# Patient Record
Sex: FEMALE | Race: WHITE | Employment: FULL TIME
[De-identification: names, ages, dates, MRNs, and addresses within clinical notes are randomized per-mention and may not be internally consistent; named-entity substitution may affect disease eponyms.]

---

## 2017-12-21 DIAGNOSIS — D51.9 ANEMIA DUE TO VITAMIN B12 DEFICIENCY, UNSPECIFIED B12 DEFICIENCY TYPE: ICD-10-CM

## 2017-12-21 DIAGNOSIS — E04.2 NONTOXIC MULTINODULAR GOITER: ICD-10-CM

## 2017-12-21 DIAGNOSIS — D50.9 IRON DEFICIENCY ANEMIA, UNSPECIFIED IRON DEFICIENCY ANEMIA TYPE: ICD-10-CM

## 2017-12-22 ENCOUNTER — TELEPHONE (OUTPATIENT)
Dept: INFUSION THERAPY | Facility: MEDICAL CENTER | Age: 37
End: 2017-12-22

## 2017-12-22 NOTE — TELEPHONE ENCOUNTER
I TRIED TO CALL [facility] TO SCHEDULE HER CORTROSYN TEST AND HAD TO LEAVE A MESSAGE TO CALL THE OFFICE TO SCHEDULE.

## 2020-10-03 ENCOUNTER — APPOINTMENT (OUTPATIENT)
Dept: CT IMAGING | Age: 40
End: 2020-10-03
Payer: COMMERCIAL

## 2020-10-03 ENCOUNTER — HOSPITAL ENCOUNTER (EMERGENCY)
Age: 40
Discharge: HOME | End: 2020-10-03
Attending: EMERGENCY MEDICINE
Payer: COMMERCIAL

## 2020-10-03 VITALS
SYSTOLIC BLOOD PRESSURE: 127 MMHG | WEIGHT: 199 LBS | DIASTOLIC BLOOD PRESSURE: 83 MMHG | TEMPERATURE: 98.3 F | BODY MASS INDEX: 35.26 KG/M2 | OXYGEN SATURATION: 98 % | RESPIRATION RATE: 15 BRPM | HEIGHT: 63 IN | HEART RATE: 71 BPM

## 2020-10-03 PROCEDURE — 99284 EMERGENCY DEPT VISIT MOD MDM: CPT

## 2020-10-03 PROCEDURE — 70450 CT HEAD/BRAIN W/O DYE: CPT

## 2020-10-03 RX ORDER — BUSPIRONE HYDROCHLORIDE 15 MG/1
TABLET ORAL
COMMUNITY
End: 2020-12-01 | Stop reason: CLARIF

## 2020-10-03 RX ORDER — LORAZEPAM 0.5 MG/1
TABLET ORAL
COMMUNITY

## 2020-10-03 RX ORDER — PROPRANOLOL HYDROCHLORIDE 20 MG/1
20 TABLET ORAL 3 TIMES DAILY
COMMUNITY
End: 2021-07-22

## 2020-10-03 RX ORDER — GABAPENTIN 100 MG/1
100 CAPSULE ORAL 2 TIMES DAILY
Qty: 28 CAPSULE | Refills: 0 | Status: SHIPPED | OUTPATIENT
Start: 2020-10-03 | End: 2020-12-01 | Stop reason: ALTCHOICE

## 2020-10-03 RX ORDER — PANTOPRAZOLE SODIUM 40 MG/1
40 GRANULE, DELAYED RELEASE ORAL
COMMUNITY

## 2020-10-03 ASSESSMENT — PAIN SCALES - GENERAL: PAINLEVEL_OUTOF10: 6

## 2020-10-03 ASSESSMENT — PAIN DESCRIPTION - LOCATION: LOCATION: HEAD

## 2020-10-03 ASSESSMENT — PAIN DESCRIPTION - PAIN TYPE: TYPE: ACUTE PAIN

## 2020-10-03 ASSESSMENT — PAIN DESCRIPTION - ORIENTATION: ORIENTATION: POSTERIOR;RIGHT

## 2020-10-03 ASSESSMENT — PAIN DESCRIPTION - DESCRIPTORS: DESCRIPTORS: BURNING

## 2020-10-03 NOTE — ED NOTES
Pt arrives to ED with c/o right posterior head pain for 5 days. She states that this hasn't gone away. She states that last night she took Mobic and Zanaflex with no relief. She was at Urgent care and was instructed to come to the ED for a CT scan. She states that her grandmother had an aneurysm, which make her feel anxious. She was told that she needs a nodule removed from her thyroid, which she states has her feeling anxious. Pt neurologically intact. Pt resting in bed, comfort offered, no concerns no s/s of distress.       Liza Valdes RN  10/03/20 7681

## 2020-10-03 NOTE — ED NOTES
Skylar STEELE at bedside updating pt on results and plan of care.          Iva Solomon RN  10/03/20 2544

## 2020-10-03 NOTE — ED PROVIDER NOTES
[address] [facility]  [address] [facility]. [facility] [address]  Phone: [phone]  Fax: [phone]        Pt Name: Brandee Pittman  MRN: [id number]  [place] 1980  Date of evaluation: 10/3/20    [address]       Chief Complaint   Patient presents with    Headache       HISTORY OF PRESENT ILLNESS (Location/Symptom, Timing/Onset, Context/Setting, Quality, Duration, Modifying Factors, Severity)      Brandee Pittman is a 47 y.o. female with pertinent PMH of Fibromyalgia, who presents to the ED via private auto with a headache. Patient reports that for the past 5 days she has been been experiencing a gradual-onset right posterior headache, which she describes as a burning sensation, that has been intermittent since the onset but also waxes and wanes in severity. Denies thunderclap development. Denies any head trauma or injury to the head. She has exacerbation of the pain with touching her head. She had alleviation with the pain when using ice. She has tried her Mobic, muscle relaxer, and anxiety medication without improvement of the pain. She has been able to go to sleep and sleep through the night. Patient does not have a Denies use of blood thinners. She does have family history of a grandmother who  of a cerebral aneurysm at 62. She has personal history of skin cancer and a noncancerous thyroid nodule. Denies fever, chills, recent illness, aura, vision changes, photophobia, phonophobia, cough, congestion, facial pain/weakness, nausea, vomiting, diarrhea, abdominal pain, syncope, extremity weakness, numbness, paresthesias, or any other complaints at this time. PAST MEDICAL / SURGICAL / SOCIAL / FAMILY HISTORY     PMH:  has a past medical history of Cancer ([address] Fibromyalgia, and Thyroid nodule. Surgical History:  has a past surgical history that includes Skin cancer excision. Social History:  reports that she has never smoked.  She has never used smokeless tobacco. She stated age, and is in no acute distress. Head:  Normocephalic, atraumatic, and without obvious abnormality. There is tenderness to palpation of the right occiput extending down into the right cervical region. No rash or wounds noted. No midline neck tenderness. Eyes:  Sclerae/conjunctivae clear without injection, pallor, or icterus. Corneas clear without opacities. PERRL EOM's intact. ENT: Ears and nose are all without obvious masses lesion or deformity. No oropharynx examination performed due to aerosolization risk during COVID-19 pandemic. Neck: Supple and symmetrical. Trachea midline. No adenopathy. No jugular venous distention. Lungs:   No respiratory distress. Clear to auscultation bilaterally. No wheezes, rhonchi, or rales. Heart:  Regular rate. Regular rhythm. No murmurs, rubs, or gallops. Abdomen:   Normoactive bowel sounds. Soft, nontender, nondistended without guarding or rebound. No palpable masses. Extremities: Warm and dry without erythema or edema. Skin: Soft, good turgor, and well-hydrated. No obvious rashes or lesions. Neurologic: GCS is 15 and no focal deficits are appreciated. Oriented to person, place and time. Detailed cognitive testing deferred. Normal and symmetric muscle bulk and tone throughout. Strength is 5/5 throughout. Sensation intact to light touch throughout. Gait with normal base. Normal heel, toe and tandem gait. Good speed, accuracy and rhythm of ADOLFO, FTN, and HTS. No pronator drift. Normal Romberg test. DTRs 2+. CN II-XII grossly intact. Psychiatric: Normal mood and affect. Normal behavior. Coherent thought process.      DIFFERENTIAL DIAGNOSIS / MDM     DDx: SAH, subdural hematoma, epidural, intracerebral, meningitis, encephalitis, migraine, tension HA, cluster HA, sinusitis, dental pain, stroke, encephalitis, abscess, CNS mass, increased ICP, venous thrombosis, CO poisoning, acute angle closure glaucoma, temporal arteritis, idiopathic intracranial hypertension    The patient presents to the Emergency Department with a benign-appearing gradual-onset headache that likely consistent with a tension headache. This is not the worst headache of her life. Vital signs are unremarkable. The neurologic examination is normal. My suspicion for serious pathology is low given a lack of significant risk factors and reassuring history and physical examination. Patient is visibly anxious and is concerned she has an aneurysm as her grandmother  of one. We discussed the risks and benefits of a CT scan and patient elected to have a CT scan performed. She is not experiencing a headache at this time and will hold off on any medications, but will provide a warm blanket for her head/neck. PLAN (LABS / IMAGING / EKG):  Orders Placed This Encounter   Procedures    CT HEAD WO CONTRAST       MEDICATIONS ORDERED:  Orders Placed This Encounter   Medications    gabapentin (NEURONTIN) 100 MG capsule     Sig: Take 1 capsule by mouth 2 times daily for 14 days. Intended supply: 30 days     Dispense:  28 capsule     Refill:  0       Controlled Substances Monitoring:     DIAGNOSTIC RESULTS     EKG: All EKG's are interpreted by the Emergency Department Physician who either signs or Co-signs this chart in the absenceof a cardiologist.    RADIOLOGY:  Non-plain film images such as CT, Ultrasound and MRI are read by the radiologist. Plain radiographic images are visualized by me and the following radiologist interpretations are reviewed. CT HEAD WO CONTRAST   Final Result   No acute intracranial abnormality. LABS:  No results found for this visit on 10/03/20. Jacob Nunez 94 COURSE     ED Course as of Oct 03 1808   Sat Oct 03, 2020   1356 Patient was updated regarding her CT scan that reported no acute intracranial abnormality. Patient notes that her headache is gone at this time and that the warm blanket did notably help her discomfort.   Patient I discussed restarting gabapentin as she felt that this was similar to her fibromyalgia pain, but was concerned since it was on her head and it had never been on her head before. I told her I would give her 2 weeks of gabapentin and she needed to follow-up with her PCP for further evaluation and management. Patient verbalized understanding. [GM]      ED Course User Index  [GM] Kia Cheng PA-C        Vitals:    Vitals:    10/03/20 1248 10/03/20 1332 10/03/20 1408   BP: (!) 141/97 (!) 130/97 127/83   Pulse: 85 85 71   Resp: 15  15   Temp: 98.3 °F (36.8 °C)     TempSrc: Oral     SpO2: 98%     Weight: 90.3 kg (199 lb)     Height: 5' 3\" (1.6 m)       -------------------------  BP: 127/83, Temp: 98.3 °F (36.8 °C), Pulse: 71, Resp: 15      RE-EVALUATION:  See ED Course notes above. I estimate there is LOW risk for SUBARACHNOID HEMORRHAGE, MENINGITIS, ENCEPHALITIS, INTRACRANIAL HEMORRHAGE, SUBDURAL OR EPIDURAL HEMATOMA, OR STROKE, thus I consider the discharge disposition reasonable. The patient and/or family and I and/or the attending have discussed the diagnosis and risks, and we agree with discharging home to follow-up with their primary doctor. The patient appears stable for discharge and has been instructed to return immediately for new concerning symptoms, if the symptoms worsen in any way, or in 8-12 hours if not improved for re-evaluation. We have discussed the symptoms which are most concerning (e.g., significant worsening of the headache or the development of confusion, fever, vision changes, weakness, numbness, difficulty with speech or walking) that necessitate immediate return. The patient understands that at this time there is no evidence for a more malignant underlying process, but the patient also understands that early in the process of an illness or injury, an emergency department workup can be falsely reassuring.  Routine discharge counseling was given, and the patient understands that worsening, changing or persistent symptoms should prompt an immediate call or follow up with their primary physician or return to the emergency department. The importance of appropriate follow up was also discussed. I have reviewed the disposition diagnosis with the patient and or their family/guardian. I have answered their questions and given discharge instructions. They voiced understanding of these instructions and did not have any further questions or complaints. This patient was seen by the attending physician and they agreed with the assessment and plan. CONSULTS:  None    PROCEDURES:  None    FINAL IMPRESSION      1. Tension headache          DISPOSITION / PLAN     CONDITION ON DISPOSITION:   Good / Stable for discharge. PATIENT REFERRED TO:  [facility]   [address] [place] [address] [address]  [address] [place] [address] [address]  [phone]    Call in 2 days        DISCHARGE MEDICATIONS:  Discharge Medication List as of 10/3/2020  2:11 PM      START taking these medications    Details   gabapentin (NEURONTIN) 100 MG capsule Take 1 capsule by mouth 2 times daily for 14 days.  Intended supply: 30 days, Disp-28 capsule,R-0Print             Vickie Collier PA-C   Emergency Medicine Physician Assistant    (Please note that portions of this note were completed with a voice recognition program.  Efforts were made to edit the dictations but occasionally words aremis-transcribed.)     Sandra Coughlin PA-C  10/03/20 4827

## 2020-10-03 NOTE — ED PROVIDER NOTES
[address] [facility]    [address] [address] [facility] [address] [facility] [address]  Phone: [phone]  Fax: [phone]  Emergency Department  Faculty Attestation    I performed a history and physical examination of the patient and discussed management with the mid level provideer. I reviewed the mid level provider's note and agree with the documented findings and plan of care. Any areas of disagreement are noted on the chart. I was personally present for the key portions of any procedures. I have documented in the chart those procedures where I was not present during the key portions. I have reviewed the emergency nurses triage note. I agree with the chief complaint, past medical history, past surgical history, allergies, medications, social and family history as documented unless otherwise noted below. Documentation of the HPI, Physical Exam and Medical Decision Making performed by medical students or scribes is based on my personal performance of the HPI, PE and MDM. For Physician Assistant/ Nurse Practitioner cases/documentation I have personally evaluated this patient and have completed at least one if not all key elements of the E/M (history, physical exam, and MDM). Additional findings are as noted. Primary Care Physician:  Lauren Carvalho DO    CHIEF COMPLAINT       Chief Complaint   Patient presents with    Headache       RECENT VITALS:   Temp: 98.3 °F (36.8 °C),  Pulse: 85, Resp: 15, BP: (!) 130/97    LABS:  Labs Reviewed - No data to display     CT HEAD WO CONTRAST (Final result)   Result time 10/03/20 13:46:42   Final result by Brennen Almonte MD (10/03/20 13:46:42)                 Impression:     No acute intracranial abnormality.              Narrative:     EXAMINATION:   CT OF THE HEAD WITHOUT CONTRAST  10/3/2020 1:21 pm     TECHNIQUE:   CT of the head was performed without the administration of intravenous   contrast. Dose modulation, iterative reconstruction, and/or weight based   adjustment of the mA/kV was utilized to reduce the radiation dose to as low   as reasonably achievable. COMPARISON:   None. HISTORY:   ORDERING SYSTEM PROVIDED HISTORY: Atraumatic Right Posterior Headache x5 days   TECHNOLOGIST PROVIDED HISTORY:   Atraumatic Right Posterior Headache x5 days     Is the patient pregnant?->No   Reason for Exam: Rt posterior head pain for 5 days, headache   Acuity: Acute   Type of Exam: Initial     FINDINGS:   BRAIN/VENTRICLES: There is no acute intracranial hemorrhage, mass effect or   midline shift.  No abnormal extra-axial fluid collection.  The gray-white   differentiation is maintained without evidence of an acute infarct.  There is   no evidence of hydrocephalus. ORBITS: The visualized portion of the orbits demonstrate no acute abnormality. SINUSES: The visualized paranasal sinuses and mastoid air cells demonstrate   no acute abnormality. SOFT TISSUES/SKULL:  No acute abnormality of the visualized skull or soft   tissues. PERTINENT ATTENDING PHYSICIAN COMMENTS:    The patient presents with a headache. She says she is having intermittent pain in the right occiput that radiates from her neck over her head. She denies any trauma. She does have a history of fibromyalgia. It is not accompanied by photosensitivity or nausea. She denies any focal weakness or numbness. She has not had CT imaging done before. She used to take gabapentin for her pain and it seemed to help, but she has not been taking it recently. The patient is worried because she has a family history of cerebral aneurysm. However, she is not having the worst headache of her life, sudden onset of pain, or thunderclap-like pain. On exam, the patient seems to indicate pain in the right occiput at the nuchal line. This seems to be consistent with a tension headache. She has no focal deficits. CT of the head was obtained is negative. We spoke with the patient about her discomfort.   She would like to start go back on the gabapentin. We will write a prescription and she can follow-up with her doctor. My index of suspicion for intracranial hemorrhage or infection is low. The patient is discharged in good condition. Valerie Smith MD  10/03/20 4944

## 2020-12-01 ENCOUNTER — OFFICE VISIT (OUTPATIENT)
Dept: PRIMARY CARE CLINIC | Age: 40
End: 2020-12-01
Payer: COMMERCIAL

## 2020-12-01 VITALS
BODY MASS INDEX: 36.24 KG/M2 | SYSTOLIC BLOOD PRESSURE: 132 MMHG | HEART RATE: 92 BPM | OXYGEN SATURATION: 98 % | DIASTOLIC BLOOD PRESSURE: 80 MMHG | WEIGHT: 204.6 LBS

## 2020-12-01 PROBLEM — R73.03 PRE-DIABETES: Status: ACTIVE | Noted: 2020-12-01

## 2020-12-01 PROBLEM — F32.A ANXIETY AND DEPRESSION: Status: ACTIVE | Noted: 2020-12-01

## 2020-12-01 PROBLEM — F41.9 ANXIETY AND DEPRESSION: Status: ACTIVE | Noted: 2020-12-01

## 2020-12-01 PROCEDURE — 1036F TOBACCO NON-USER: CPT | Performed by: NURSE PRACTITIONER

## 2020-12-01 PROCEDURE — 99204 OFFICE O/P NEW MOD 45 MIN: CPT | Performed by: NURSE PRACTITIONER

## 2020-12-01 PROCEDURE — G8419 CALC BMI OUT NRM PARAM NOF/U: HCPCS | Performed by: NURSE PRACTITIONER

## 2020-12-01 PROCEDURE — G8427 DOCREV CUR MEDS BY ELIG CLIN: HCPCS | Performed by: NURSE PRACTITIONER

## 2020-12-01 PROCEDURE — G8484 FLU IMMUNIZE NO ADMIN: HCPCS | Performed by: NURSE PRACTITIONER

## 2020-12-01 RX ORDER — BUPROPION HYDROCHLORIDE 100 MG/1
100 TABLET ORAL 2 TIMES DAILY
COMMUNITY
End: 2021-07-22

## 2020-12-01 ASSESSMENT — ENCOUNTER SYMPTOMS
SINUS PRESSURE: 0
VOMITING: 0
DIARRHEA: 0
TROUBLE SWALLOWING: 0
ABDOMINAL PAIN: 0
CONSTIPATION: 0
NAUSEA: 0
BLOOD IN STOOL: 0
COUGH: 0
SHORTNESS OF BREATH: 0
WHEEZING: 0
SORE THROAT: 0

## 2020-12-01 NOTE — PROGRESS NOTES
[facility] PRIMARY CARE  [facility] [address]  [address] [address]  Dept: [phone]  Dept Fax: [phone]    Ahsan Norwood is a 47 y.o. female who presentstoday for her medical conditions/complaints as noted below. Ahsan Norwood is c/o of  Chief Complaint   Patient presents with    New Patient     would like to discuss care for post- thyroidectomy, has  a PCP will not follow for that     Other     has tried endocrinology, looking for a better option          HPI:     Here today to establish care as new patient  She states she is currently established with a primary care provider but that person was unwilling to consider managing her thyroid condition postoperatively  She currently is seeing an endocrinologist but was not pleased with her last visit there and is looking for someone to help manage her condition after her surgery  She is planning to have a thyroidectomy in May at [facility] for several enlarging nodules  Reports was initially scheduled for December but had to be rescheduled due to COVID-19 pandemic  She has done significant amounts of research on her condition via the Internet and admits she has probably \"read too much\" and has become very concerned about postop weight gain and other potential medical problems  Reports past history significant for depression and anxiety. Has recently been started on Wellbutrin and states she feels her condition is fairly well managed at this time with that. She also uses Ativan rarely for panic attacks and/or extreme anxiety. Her current prescription is over 3year old.   Was also told that she was \"prediabetic\" and has been taking metformin twice a day  She reports her recent A1c was unchanged from prior to starting the medication there are no labs or previous records available, she states will request from her PCP office      She states she has had a lifelong struggle with her weight  Consequently she tries to follow a healthy diet and maintains a regular exercise routine        No results found for: LABA1C          ( goal A1C is < 7)   No results found for: LABMICR  No results found for: LDLCHOLESTEROL, LDLCALC    (goal LDL is <100)   No results found for: AST, ALT, BUN  BP Readings from Last 3 Encounters:   12/01/20 132/80   10/03/20 127/83          ([id number])    Past Medical History:   Diagnosis Date    Cancer (HCC)-skin     Fibromyalgia     Thyroid nodule       Past Surgical History:   Procedure Laterality Date    SKIN CANCER EXCISION         History reviewed. No pertinent family history. Social History     Tobacco Use    Smoking status: Never Smoker    Smokeless tobacco: Never Used   Substance Use Topics    Alcohol use: Not Currently      Current Outpatient Medications   Medication Sig Dispense Refill    buPROPion (WELLBUTRIN) 100 MG tablet Take 100 mg by mouth 2 times daily      metFORMIN (GLUCOPHAGE) 500 MG tablet 1 tablet with a meal      pantoprazole sodium (PROTONIX) 40 MG PACK packet 1 tablet      LORazepam (ATIVAN) 0.5 MG tablet LORazepam 0.5 MG Oral Tablet  TAKE 1 TABLET DAILY AS NEEDED. Quantity: 30 Refills: 0     Lue Ceja D.O.; Active      propranolol (INDERAL) 20 MG tablet Take 20 mg by mouth 3 times daily       No current facility-administered medications for this visit.       Allergies   Allergen Reactions    Penicillins Other (See Comments)    Sudafed  [Pseudoephedrine Hcl] Other (See Comments)       Health Maintenance   Topic Date Due    Varicella vaccine (1 of 2 - 2-dose childhood series) 03/01/1983    HIV screen  03/01/1997    DTaP/Tdap/Td vaccine (1 - Tdap) 03/01/2001    Cervical cancer screen  03/01/2003    Flu vaccine (1) 09/01/2020    Hepatitis A vaccine  Aged Out    Hepatitis B vaccine  Aged Out    Hib vaccine  Aged Out    Meningococcal (ACWY) vaccine  Aged Out    Pneumococcal 0-64 years Vaccine  Aged Out       Subjective: Review of Systems   Constitutional: Negative for activity change, appetite change, chills, fatigue, fever and unexpected weight change. HENT: Negative for congestion, ear pain, hearing loss, sinus pressure, sore throat and trouble swallowing. Eyes: Negative for visual disturbance. Respiratory: Negative for cough, shortness of breath and wheezing. Cardiovascular: Negative for chest pain, palpitations and leg swelling. Gastrointestinal: Negative for abdominal pain, blood in stool, constipation, diarrhea, nausea and vomiting. Endocrine: Negative for cold intolerance, heat intolerance, polydipsia, polyphagia and polyuria. Genitourinary: Negative for difficulty urinating, frequency, hematuria and urgency. Musculoskeletal: Negative for arthralgias and myalgias. Skin: Negative for rash. Allergic/Immunologic: Negative for environmental allergies. Neurological: Negative for dizziness, weakness, light-headedness and headaches. Psychiatric/Behavioral: Negative for confusion. The patient is not nervous/anxious. Objective:     Physical Exam  Constitutional:       Appearance: She is well-developed. HENT:      Head: Normocephalic. Eyes:      Conjunctiva/sclera: Conjunctivae normal.      Pupils: Pupils are equal, round, and reactive to light. Neck:      Musculoskeletal: Normal range of motion. Cardiovascular:      Rate and Rhythm: Normal rate and regular rhythm. Heart sounds: Normal heart sounds. No murmur. Pulmonary:      Effort: Pulmonary effort is normal.      Breath sounds: Normal breath sounds. No wheezing. Abdominal:      General: Bowel sounds are normal. There is no distension. Palpations: Abdomen is soft. Musculoskeletal: Normal range of motion. Skin:     General: Skin is warm and dry. Neurological:      Mental Status: She is alert and oriented to person, place, and time. Psychiatric:         Behavior: Behavior normal.         Thought Content:  Thought content normal.         Judgment: Judgment normal.       /80   Pulse 92   Wt 204 lb 9.6 oz (92.8 kg)   SpO2 98%   BMI 36.24 kg/m²     Assessment:       Diagnosis Orders   1. Nontoxic multinodular goiter     2. Anxiety and depression     3. Pre-diabetes               Plan:      Return in about 6 months (around 6/1/2021) for after thyroid surgery. Establish care as new patient, offered screening labs but she states will be having a number of labs done prior to her surgery and prefers to avoid screening labs at this time. Reviewed healthy lifestyle habits and encouraged to continue healthy diet choices and regular exercise. She does see an OB/GYN for her women's health needs  Multinodular goiter-lengthy discussion. Clarified to patient this is not an endocrinology office and will be unable to manage her condition once her thyroid is removed. Did discuss will be able to assist her with potential side effects from thyroidectomy such as weight gain, hair loss or blood pressure changes. She is considering staying with her current PCP but at the end of this visit she is undecided whether or not she will remain with this office. She is also encouraged to revisit her current endocrinologist office to speak with another provider there to readdress her concerns and/or to contact [facility] [employer] clinic for her surgeon's opinion on [facility] if she prefers. While she has many valid concerns she appears overly anxious about possible weight gain in particular   Anxiety/depression-stable on Wellbutrin at this time with rare use of Ativan as needed  Prediabetes-lengthy discussion, consider stopping medication but would like to check A1c first.  She will notify office if decides she would like to proceed with some lab work       Patient given educational materials - see patient instructions. Discussed use, benefit, and side effects of prescribed medications.   All patientquestions

## 2021-01-26 ENCOUNTER — NURSE TRIAGE (OUTPATIENT)
Dept: OTHER | Facility: CLINIC | Age: 41
End: 2021-01-26

## 2021-01-26 NOTE — TELEPHONE ENCOUNTER
Brief description of triage: Pt has chronic back pain, this pain is higher in back and radiates to belly button. Pt denies N/V/D or difficulty with bowel/bladder. Pt took OTC Azo without relief. Triage indicates for patient to f/u with PCP in next 3 days. Care advice provided, patient verbalizes understanding; denies any other questions or concerns; instructed to call back for any new or worsening symptoms. This triage is a result of a call to [address] [place] [address]. Please do not respond to the triage nurse through this encounter. Any subsequent communication should be directly with the patient. Reason for Disposition   Patient wants to be seen    Answer Assessment - Initial Assessment Questions  1. ONSET: \"When did the pain begin? \"       Pt states since Sunday    2. LOCATION: \"Where does it hurt? \" (upper, mid or lower back)      Pt states lower back pain upper mid back that wraps around to front R    3. SEVERITY: \"How bad is the pain? \"  (e.g., Scale 1-10; mild, moderate, or severe)    - MILD (1-3): doesn't interfere with normal activities     - MODERATE (4-7): interferes with normal activities or awakens from sleep     - SEVERE (8-10): excruciating pain, unable to do any normal activities       3-4/10    4. PATTERN: \"Is the pain constant? \" (e.g., yes, no; constant, intermittent)       Pt states intermittent burning/dull pain. Worse with sitting    5. RADIATION: \"Does the pain shoot into your legs or elsewhere? \"      Denies    6. CAUSE:  \"What do you think is causing the back pain? \"       Denies    7. BACK OVERUSE:  Justine Gallegos recent lifting of heavy objects, strenuous work or exercise? \"      Denies    8. MEDICATIONS: \"What have you taken so far for the pain? \" (e.g., nothing, acetaminophen, NSAIDS)      Denies, but has taken an OTC medication to treat UTI's, Azo.    9. NEUROLOGIC SYMPTOMS: \"Do you have any weakness, numbness, or problems with bowel/bladder control? \"      Denies    10.  OTHER SYMPTOMS: \"Do you have any other symptoms? \" (e.g., fever, abdominal pain, burning with urination, blood in urine)        Denies    11. PREGNANCY: \"Is there any chance you are pregnant? \" (e.g., yes, no; LMP)        Denies    Protocols used: BACK PAIN-ADULT-OH    Please see triage note above.

## 2021-03-09 ENCOUNTER — HOSPITAL ENCOUNTER (OUTPATIENT)
Dept: MRI IMAGING | Facility: CLINIC | Age: 41
Discharge: HOME | End: 2021-03-11
Payer: COMMERCIAL

## 2021-03-09 DIAGNOSIS — M51.24 DISPLACEMENT OF THORACIC INTERVERTEBRAL DISC WITHOUT MYELOPATHY: ICD-10-CM

## 2021-04-01 ENCOUNTER — HOSPITAL ENCOUNTER (OUTPATIENT)
Dept: MRI IMAGING | Facility: CLINIC | Age: 41
Discharge: HOME | End: 2021-04-03
Payer: COMMERCIAL

## 2021-04-01 PROCEDURE — 72146 MRI CHEST SPINE W/O DYE: CPT

## 2021-05-18 ENCOUNTER — HOSPITAL ENCOUNTER (EMERGENCY)
Age: 41
Discharge: HOME | End: 2021-05-18
Attending: EMERGENCY MEDICINE
Payer: COMMERCIAL

## 2021-07-22 ENCOUNTER — APPOINTMENT (OUTPATIENT)
Dept: GENERAL RADIOLOGY | Age: 41
End: 2021-07-22
Payer: COMMERCIAL

## 2021-07-22 ENCOUNTER — HOSPITAL ENCOUNTER (EMERGENCY)
Age: 41
Discharge: HOME | End: 2021-07-22
Attending: EMERGENCY MEDICINE
Payer: COMMERCIAL

## 2021-07-22 VITALS
HEART RATE: 91 BPM | TEMPERATURE: 98.2 F | DIASTOLIC BLOOD PRESSURE: 84 MMHG | RESPIRATION RATE: 19 BRPM | OXYGEN SATURATION: 97 % | BODY MASS INDEX: 35.26 KG/M2 | SYSTOLIC BLOOD PRESSURE: 125 MMHG | HEIGHT: 63 IN | WEIGHT: 199 LBS

## 2021-07-22 DIAGNOSIS — R07.9 CHEST PAIN, UNSPECIFIED TYPE: Primary | ICD-10-CM

## 2021-07-22 LAB
ABSOLUTE EOS #: 0.1 K/UL (ref 0–0.4)
ABSOLUTE IMMATURE GRANULOCYTE: ABNORMAL K/UL (ref 0–0.3)
ABSOLUTE LYMPH #: 3.8 K/UL (ref 1–4.8)
ABSOLUTE MONO #: 1.1 K/UL (ref 0.1–1.2)
ALBUMIN SERPL-MCNC: 3.8 G/DL (ref 3.5–5.2)
ALBUMIN/GLOBULIN RATIO: 1.2 (ref 1–2.5)
ALP BLD-CCNC: 76 U/L (ref 35–104)
ALT SERPL-CCNC: 14 U/L (ref 5–33)
ANION GAP SERPL CALCULATED.3IONS-SCNC: 8 MMOL/L (ref 9–17)
AST SERPL-CCNC: 16 U/L
BASOPHILS # BLD: 1 % (ref 0–2)
BASOPHILS ABSOLUTE: 0.1 K/UL (ref 0–0.2)
BILIRUB SERPL-MCNC: 0.23 MG/DL (ref 0.3–1.2)
BUN BLDV-MCNC: 18 MG/DL (ref 6–20)
BUN/CREAT BLD: ABNORMAL (ref 9–20)
CALCIUM SERPL-MCNC: 9.2 MG/DL (ref 8.6–10.4)
CHLORIDE BLD-SCNC: 105 MMOL/L (ref 98–107)
CO2: 26 MMOL/L (ref 20–31)
CREAT SERPL-MCNC: 0.58 MG/DL (ref 0.5–0.9)
D-DIMER QUANTITATIVE: 0.21 MG/L FEU
DIFFERENTIAL TYPE: ABNORMAL
EKG ATRIAL RATE: 70 BPM
EKG ATRIAL RATE: 83 BPM
EKG P AXIS: 3 DEGREES
EKG P AXIS: 48 DEGREES
EKG P-R INTERVAL: 160 MS
EKG P-R INTERVAL: 166 MS
EKG Q-T INTERVAL: 372 MS
EKG Q-T INTERVAL: 396 MS
EKG QRS DURATION: 80 MS
EKG QRS DURATION: 84 MS
EKG QTC CALCULATION (BAZETT): 427 MS
EKG QTC CALCULATION (BAZETT): 437 MS
EKG R AXIS: 23 DEGREES
EKG R AXIS: 5 DEGREES
EKG T AXIS: 26 DEGREES
EKG T AXIS: 28 DEGREES
EKG VENTRICULAR RATE: 70 BPM
EKG VENTRICULAR RATE: 83 BPM
EOSINOPHILS RELATIVE PERCENT: 1 % (ref 1–4)
GFR AFRICAN AMERICAN: >60 ML/MIN
GFR NON-AFRICAN AMERICAN: >60 ML/MIN
GFR SERPL CREATININE-BSD FRML MDRD: ABNORMAL ML/MIN/{1.73_M2}
GFR SERPL CREATININE-BSD FRML MDRD: ABNORMAL ML/MIN/{1.73_M2}
GLUCOSE BLD-MCNC: 107 MG/DL (ref 70–99)
HCG QUALITATIVE: NEGATIVE
HCT VFR BLD CALC: 36.1 % (ref 36–46)
HEMOGLOBIN: 12 G/DL (ref 12–16)
IMMATURE GRANULOCYTES: ABNORMAL %
LYMPHOCYTES # BLD: 33 % (ref 24–44)
MCH RBC QN AUTO: 30 PG (ref 26–34)
MCHC RBC AUTO-ENTMCNC: 33.3 G/DL (ref 31–37)
MCV RBC AUTO: 90.1 FL (ref 80–100)
MONOCYTES # BLD: 10 % (ref 2–11)
NRBC AUTOMATED: ABNORMAL PER 100 WBC
PDW BLD-RTO: 13.5 % (ref 12.5–15.4)
PLATELET # BLD: 423 K/UL (ref 140–450)
PLATELET ESTIMATE: ABNORMAL
PMV BLD AUTO: 8.5 FL (ref 6–12)
POTASSIUM SERPL-SCNC: 4.1 MMOL/L (ref 3.7–5.3)
RBC # BLD: 4.01 M/UL (ref 4–5.2)
RBC # BLD: ABNORMAL 10*6/UL
SEG NEUTROPHILS: 55 % (ref 36–66)
SEGMENTED NEUTROPHILS ABSOLUTE COUNT: 6.2 K/UL (ref 1.8–7.7)
SODIUM BLD-SCNC: 139 MMOL/L (ref 135–144)
T3 FREE: 3.23 PG/ML (ref 2.02–4.43)
THYROXINE, FREE: 1.28 NG/DL (ref 0.93–1.7)
TOTAL PROTEIN: 7 G/DL (ref 6.4–8.3)
TROPONIN INTERP: NORMAL
TROPONIN INTERP: NORMAL
TROPONIN T: NORMAL NG/ML
TROPONIN T: NORMAL NG/ML
TROPONIN, HIGH SENSITIVITY: <6 NG/L (ref 0–14)
TROPONIN, HIGH SENSITIVITY: <6 NG/L (ref 0–14)
TSH SERPL DL<=0.05 MIU/L-ACNC: 0.08 MIU/L (ref 0.3–5)
WBC # BLD: 11.3 K/UL (ref 3.5–11)
WBC # BLD: ABNORMAL 10*3/UL

## 2021-07-22 PROCEDURE — 84481 FREE ASSAY (FT-3): CPT

## 2021-07-22 PROCEDURE — 80053 COMPREHEN METABOLIC PANEL: CPT

## 2021-07-22 PROCEDURE — 71045 X-RAY EXAM CHEST 1 VIEW: CPT

## 2021-07-22 PROCEDURE — 84484 ASSAY OF TROPONIN QUANT: CPT

## 2021-07-22 PROCEDURE — 84703 CHORIONIC GONADOTROPIN ASSAY: CPT

## 2021-07-22 PROCEDURE — 84443 ASSAY THYROID STIM HORMONE: CPT

## 2021-07-22 PROCEDURE — 36415 COLL VENOUS BLD VENIPUNCTURE: CPT

## 2021-07-22 PROCEDURE — 85025 COMPLETE CBC W/AUTO DIFF WBC: CPT

## 2021-07-22 PROCEDURE — 84439 ASSAY OF FREE THYROXINE: CPT

## 2021-07-22 PROCEDURE — 99284 EMERGENCY DEPT VISIT MOD MDM: CPT

## 2021-07-22 PROCEDURE — 85379 FIBRIN DEGRADATION QUANT: CPT

## 2021-07-22 PROCEDURE — 6370000000 HC RX 637 (ALT 250 FOR IP): Performed by: EMERGENCY MEDICINE

## 2021-07-22 PROCEDURE — 96360 HYDRATION IV INFUSION INIT: CPT

## 2021-07-22 PROCEDURE — 93005 ELECTROCARDIOGRAM TRACING: CPT | Performed by: EMERGENCY MEDICINE

## 2021-07-22 PROCEDURE — 2580000003 HC RX 258: Performed by: EMERGENCY MEDICINE

## 2021-07-22 RX ORDER — ASPIRIN 81 MG/1
324 TABLET, CHEWABLE ORAL ONCE
Status: COMPLETED | OUTPATIENT
Start: 2021-07-22 | End: 2021-07-22

## 2021-07-22 RX ORDER — LEVOTHYROXINE AND LIOTHYRONINE 57; 13.5 UG/1; UG/1
90 TABLET ORAL DAILY
Status: ON HOLD | COMMUNITY
End: 2022-03-18 | Stop reason: ALTCHOICE

## 2021-07-22 RX ORDER — 0.9 % SODIUM CHLORIDE 0.9 %
1000 INTRAVENOUS SOLUTION INTRAVENOUS ONCE
Status: COMPLETED | OUTPATIENT
Start: 2021-07-22 | End: 2021-07-22

## 2021-07-22 RX ADMIN — ASPIRIN 324 MG: 81 TABLET, CHEWABLE ORAL at 04:27

## 2021-07-22 RX ADMIN — SODIUM CHLORIDE 1000 ML: 9 INJECTION, SOLUTION INTRAVENOUS at 04:27

## 2021-07-22 ASSESSMENT — PAIN DESCRIPTION - PAIN TYPE: TYPE: ACUTE PAIN

## 2021-07-22 ASSESSMENT — PAIN DESCRIPTION - LOCATION: LOCATION: CHEST

## 2021-07-22 ASSESSMENT — PAIN SCALES - GENERAL: PAINLEVEL_OUTOF10: 2

## 2021-07-22 ASSESSMENT — HEART SCORE: ECG: 1

## 2021-07-22 NOTE — ED PROVIDER NOTES
[address] [facility]  [address] [facility] [address]  Phone: [phone]  Fax: [phone]      Pt Name: Shawn Rocha  LEI:[id number]  [place] 1980  Date of evaluation: 7/22/2021      CHIEF COMPLAINT       Chief Complaint   Patient presents with    Tachycardia     reports HR in 100s     Sweats    Chest Pain     reports being treated for costochondritis        HISTORY OF PRESENT ILLNESS   Shawn Rocha is a 46 y.o. female with history of anxiety, hypertension, hypothyroidism, thyroid removal, GERD, and hypocalcemia who presents for evaluation of chest pain. The patient reports that she woke up at 3 AM with a dull, achy, nonradiating, midsternal chest pain with the sensation that her heart is racing. She took her blood pressure and her wrist cuff read 70/40 with heart rate between 100-110. The patient states that she also had associated lightheadedness but denies any shortness of breath, nausea, vomiting, syncope, dizziness, diaphoresis, focal weakness, numbness or tingling. She has not taken any medications for her symptoms and does not list any provoking or palliating factors. She denies any personal or family history of cardiac disease or blood clots and denies recent travel, recent surgery, hemoptysis, estrogen use, calf tenderness, calf swelling, or cancer history. The patient states that she had a stress test 5 years ago secondary to chest pain and her symptoms were determined to be caused by anxiety. She states that she was changed from levothyroxine 150 mg to [place]by 90 mg +15 mg  approximately 2 weeks ago. She states that her metoprolol was also discontinued 1 week ago. She had been prescribed metoprolol secondary to elevated heart rate while her thyroid was being removed. The patient does not list any other provoking or palliating factors.   She denies fever, chills, headache, vision changes, neck pain, back pain, abdominal pain, urinary/bowel symptoms, recent injury or illness. REVIEW OF SYSTEMS     Ten point review of systems was reviewed and is negative unless otherwise noted in the HPI    [address]    has a past medical history of Cancer (HCC)-skin, Fibromyalgia, and Thyroid nodule. SURGICAL HISTORY      has a past surgical history that includes Skin cancer excision and Thyroidectomy. CURRENT MEDICATIONS       Previous Medications    ARMOUR THYROID PO    Take 15 mg by mouth Daily    LORAZEPAM (ATIVAN) 0.5 MG TABLET    LORazepam 0.5 MG Oral Tablet  TAKE 1 TABLET DAILY AS NEEDED. Quantity: 30 Refills: 0     Daril Murrain D.O.; Active    PANTOPRAZOLE SODIUM (PROTONIX) 40 MG PACK PACKET    1 tablet    THYROID (ARMOUR THYROID) 90 MG TABLET    Take 90 mg by mouth daily       ALLERGIES     is allergic to penicillins and sudafed  [pseudoephedrine hcl]. FAMILY HISTORY     has no family status information on file. family history is not on file. SOCIAL HISTORY      reports that she has never smoked. She has never used smokeless tobacco. She reports previous alcohol use. She reports current drug use. Drug: Marijuana. PHYSICAL EXAM     INITIAL VITALS:  height is 5' 3\" (1.6 m) and weight is 90.3 kg (199 lb). Her oral temperature is 98.2 °F (36.8 °C). Her blood pressure is 125/84 and her pulse is 91. Her respiration is 19 and oxygen saturation is 97%. CONSTITUTIONAL: Anxious, nontoxic  SKIN: Well-healed surgical scar over the thyroid. Skin otherwise warm, dry, no jaundice, hives or petechiae  EYES: clear conjunctiva, non-icteric sclera  HENT: normocephalic, atraumatic, moist mucus membranes  NECK: Nontender and supple with no nuchal rigidity, full range of motion  PULMONARY: clear to auscultation without wheezes, rhonchi, or rales, normal excursion, no accessory muscle use and no stridor  CARDIOVASCULAR: regular rate, rhythm. Strong radial pulses with intact distal perfusion. Capillary refill <2 seconds.   GASTROINTESTINAL: soft, non-tender, non-distended, no palpable masses, no rebound or guarding   GENITOURINARY: No costovertebral angle tenderness to palpation  MUSCULOSKELETAL: No midline spinal tenderness, step off or deformity. Extremities are otherwise nontender to palpation and nonerythematous. Compartments soft. No peripheral edema. NEUROLOGIC: alert and oriented x 3, GCS 15, normal mentation and speech. Moves all extremities x 4 without motor or sensory deficit, gait is stable without ataxia  PSYCHIATRIC: normal mood and affect, thought process is clear and linear    DIAGNOSTIC RESULTS     EKG:  EKG 3:59 AM sinus rhythm, rate 70 bpm, normal axis, normal intervals, no ST elevation or depression, T wave flattening in 3, good R wave progression, Q wave in 3 and aVR, no previous EKG for comparison    EKG 6:14 AM sinus rhythm, rate 83 bpm, normal axis, normal intervals, no ST elevation or depression, T wave inversion in 3, good R wave progression, Q wave in 3 and aVR, no change from first EKG    RADIOLOGY:   XR CHEST PORTABLE    Result Date: 7/22/2021  EXAMINATION: ONE XRAY VIEW OF THE CHEST 7/22/2021 5:20 am COMPARISON: 02/14/2014 HISTORY: ORDERING SYSTEM PROVIDED HISTORY: Chest pain TECHNOLOGIST PROVIDED HISTORY: Chest pain Reason for Exam: chest pain Acuity: Acute Type of Exam: Initial Relevant Medical/Surgical History: thyroid cancer FINDINGS: Heart size and pulmonary vasculature are normal.  The lungs are clear and normally expanded. Surrounding osseous and soft tissue structures are unremarkable. Normal examination.        LABS:  Results for orders placed or performed during the hospital encounter of 07/22/21   CBC Auto Differential   Result Value Ref Range    WBC 11.3 (H) 3.5 - 11.0 k/uL    RBC 4.01 4.0 - 5.2 m/uL    Hemoglobin 12.0 12.0 - 16.0 g/dL    Hematocrit 36.1 36 - 46 %    MCV 90.1 80 - 100 fL    MCH 30.0 26 - 34 pg    MCHC 33.3 31 - 37 g/dL    RDW 13.5 12.5 - 15.4 %    Platelets 218 010 - 408 k/uL    MPV 8.5 6.0 - 12.0 fL    NRBC Automated NOT REPORTED per 100 WBC    Differential Type NOT REPORTED     Seg Neutrophils 55 36 - 66 %    Lymphocytes 33 24 - 44 %    Monocytes 10 2 - 11 %    Eosinophils % 1 1 - 4 %    Basophils 1 0 - 2 %    Immature Granulocytes NOT REPORTED 0 %    Segs Absolute 6.20 1.8 - 7.7 k/uL    Absolute Lymph # 3.80 1.0 - 4.8 k/uL    Absolute Mono # 1.10 0.1 - 1.2 k/uL    Absolute Eos # 0.10 0.0 - 0.4 k/uL    Basophils Absolute 0.10 0.0 - 0.2 k/uL    Absolute Immature Granulocyte NOT REPORTED 0.00 - 0.30 k/uL    WBC Morphology NOT REPORTED     RBC Morphology NOT REPORTED     Platelet Estimate NOT REPORTED    Comprehensive Metabolic Panel w/ Reflex to MG   Result Value Ref Range    Glucose 107 (H) 70 - 99 mg/dL    BUN 18 6 - 20 mg/dL    CREATININE 0.58 0.50 - 0.90 mg/dL    Bun/Cre Ratio NOT REPORTED 9 - 20    Calcium 9.2 8.6 - 10.4 mg/dL    Sodium 139 135 - 144 mmol/L    Potassium 4.1 3.7 - 5.3 mmol/L    Chloride 105 98 - 107 mmol/L    CO2 26 20 - 31 mmol/L    Anion Gap 8 (L) 9 - 17 mmol/L    Alkaline Phosphatase 76 35 - 104 U/L    ALT 14 5 - 33 U/L    AST 16 <32 U/L    Total Bilirubin 0.23 (L) 0.3 - 1.2 mg/dL    Total Protein 7.0 6.4 - 8.3 g/dL    Albumin 3.8 3.5 - 5.2 g/dL    Albumin/Globulin Ratio 1.2 1.0 - 2.5    GFR Non-African American >60 >60 mL/min    GFR African American >60 >60 mL/min    GFR Comment          GFR Staging NOT REPORTED    Troponin   Result Value Ref Range    Troponin, High Sensitivity <6 0 - 14 ng/L    Troponin T NOT REPORTED <0.03 ng/mL    Troponin Interp NOT REPORTED    D-Dimer, Quantitative   Result Value Ref Range    D-Dimer, Quant 0.21 mg/L FEU   HCG Qualitative, Serum   Result Value Ref Range    hCG Qual NEGATIVE NEGATIVE   TSH without Reflex   Result Value Ref Range    TSH 0.08 (L) 0.30 - 5.00 mIU/L   Troponin   Result Value Ref Range    Troponin, High Sensitivity <6 0 - 14 ng/L    Troponin T NOT REPORTED <0.03 ng/mL    Troponin Interp NOT REPORTED    EKG 12 Lead   Result Value Ref Range Ventricular Rate 83 BPM    Atrial Rate 83 BPM    P-R Interval 166 ms    QRS Duration 80 ms    Q-T Interval 372 ms    QTc Calculation (Bazett) 437 ms    P Axis 48 degrees    R Axis 5 degrees    T Axis 28 degrees       EMERGENCY DEPARTMENT COURSE:        The patient was given the following medications:  Orders Placed This Encounter   Medications    0.9 % sodium chloride bolus    aspirin chewable tablet 324 mg        Vitals:    Vitals:    07/22/21 0605 07/22/21 0615 07/22/21 0619 07/22/21 0630   BP: 125/84      Pulse: 98 79  91   Resp: 20 19 19   Temp:   98.2 °F (36.8 °C)    TempSrc:   Oral    SpO2: 100% 98%  97%   Weight:       Height:         -------------------------  BP: 125/84, Temp: 98.2 °F (36.8 °C), Pulse: 91, Resp: 19    CONSULTS:  None    CRITICAL CARE:   None    PROCEDURES:  None    Heart Score    Heart Score for chest pain patients  History: Slightly Suspicious  ECG: Non-Specifc repolarization disturbance/LBTB/PM  Patient Age: < 45 years  Risk Factors: 1 or 2 risk factors  Troponin: < 1X normal limit  Heart Score Total: 2    Score 0 - 3 = 2.5%  MACE over next 6 wks = Discharge home  Score 4 - 6 = 20.3%  MACE over next 6 wks = Obs admit  Score 7 - 10 = 72.7%  MACE over next 6 wks = Early invasive Rx      DIAGNOSIS/ MDM:   Ahsan Norwood is a 46 y.o. female who presents with chest pain. Vital signs are stable. Heart regular rate and rhythm. Lungs clear to auscultation. Abdomen soft nontender. Capillary refill less than 2 seconds. Radial pulses 2+/4 and equal bilaterally. Troponin negative x2. EKG x2 shows sinus rhythm without any ST changes. CBC, CMP, D-dimer and hCG are unremarkable. TSH is low is low which is consistent with her history. Free T3 and Free T4 is pending but is a send out. Chest x-ray shows no acute process. I suspect the patient's symptoms may be secondary to your recent medication adjustments but I have low suspicion for thyroid storm or myxedema coma.   Her heart score is 2 and I have low suspicion for ACS, PE, dissection, esophageal rupture, cardiac tamponade, pneumothorax or infection. I instructed the patient to stay well-hydrated and to follow-up with her PCP in 1 to 2 days to go over the results of her free T3/T4. I instructed her to return to the ER for worsening symptoms or any other concern. The patient understands that at this time there is no evidence for a more malignant underlying process, but also understands that early in the process of an illness or injury, an emergency department work-up can be falsely reassuring. Routine discharge counseling was given, and the patient understands that worsening, changing or persistent symptoms should prompt a immediate call or follow-up with their primary care physician or return to the emergency department. The importance of appropriate follow-up was also discussed. I have reviewed the disposition diagnosis with the patient. I have answered their questions and given discharge instructions. They voiced understanding of these instructions and did not have any further questions or complaints. FINAL IMPRESSION      1. Chest pain, unspecified type          DISPOSITION/PLAN   DISPOSITION          PATIENT REFERRED TO:  Lacie Oppenheim, MD  [place] [address]  [phone]    Schedule an appointment as soon as possible for a visit in 2 days      [facility]  [address] [facility]   [address] [place] [address]  [phone]  Go to   If symptoms worsen      DISCHARGE MEDICATIONS:  New Prescriptions    No medications on file       (Please note that portions of this note were completed with a voice recognitionprogram.  Efforts were made to edit the dictations but occasionally words are mis-transcribed.)    Saulo Gonzalez DO DO  Emergency Physician Attending         Saulo Gonzalez DO  07/22/21 8181

## 2021-07-22 NOTE — ED NOTES
Pt to ER with family, ambulated to room, steady gait. Pt reports feeling fast heart rate, reports BP was low at home per wrist BP cuff, and she felt sweaty. Pt reports recent medication changes, reports Wellbutrin was tapered off about two weeks ago, and synthroid was switched with Slaughter thyroid medicine, reports thyroidectomy in May at [facility]. Pt also reports she was taking metoprolol 12.5 mg daily, reports this was stopped last week. Pt also reports chest pain, reports she is being treated for costochondritis, reports was seen at urgent care today, to start prednisone prescription tomorrow. Pt rates pain 2/10, denies analgesics PTA.  Pt appears nervous, but remains calm, cooperative, respers even non labored, skin warm pink, no distress, here for eval.      Whitney Myrick RN  07/22/21 9133

## 2021-09-20 ENCOUNTER — OFFICE VISIT (OUTPATIENT)
Dept: FAMILY MEDICINE CLINIC | Age: 41
End: 2021-09-20
Payer: COMMERCIAL

## 2021-09-20 VITALS
BODY MASS INDEX: 34.79 KG/M2 | SYSTOLIC BLOOD PRESSURE: 128 MMHG | TEMPERATURE: 98.2 F | RESPIRATION RATE: 14 BRPM | WEIGHT: 196.4 LBS | OXYGEN SATURATION: 98 % | DIASTOLIC BLOOD PRESSURE: 90 MMHG | HEART RATE: 68 BPM

## 2021-09-20 DIAGNOSIS — M79.10 MUSCLE PAIN: ICD-10-CM

## 2021-09-20 DIAGNOSIS — M94.0 ACUTE COSTOCHONDRITIS: Primary | ICD-10-CM

## 2021-09-20 DIAGNOSIS — M79.18 MUSCULOSKELETAL PAIN: ICD-10-CM

## 2021-09-20 PROCEDURE — G8417 CALC BMI ABV UP PARAM F/U: HCPCS | Performed by: NURSE PRACTITIONER

## 2021-09-20 PROCEDURE — G8427 DOCREV CUR MEDS BY ELIG CLIN: HCPCS | Performed by: NURSE PRACTITIONER

## 2021-09-20 PROCEDURE — 1036F TOBACCO NON-USER: CPT | Performed by: NURSE PRACTITIONER

## 2021-09-20 PROCEDURE — 99213 OFFICE O/P EST LOW 20 MIN: CPT | Performed by: NURSE PRACTITIONER

## 2021-09-20 RX ORDER — MELOXICAM 7.5 MG/1
TABLET ORAL
COMMUNITY
Start: 2021-07-21

## 2021-09-20 RX ORDER — PANTOPRAZOLE SODIUM 40 MG/1
TABLET, DELAYED RELEASE ORAL
COMMUNITY
Start: 2021-09-13 | End: 2022-03-27

## 2021-09-20 RX ORDER — B-COMPLEX WITH VITAMIN C
1 TABLET ORAL 3 TIMES DAILY
COMMUNITY
Start: 2021-05-13 | End: 2022-02-11 | Stop reason: ALTCHOICE

## 2021-09-20 RX ORDER — PREDNISONE 20 MG/1
20 TABLET ORAL DAILY
Qty: 15 TABLET | Refills: 0 | Status: SHIPPED | OUTPATIENT
Start: 2021-09-20 | End: 2021-10-05 | Stop reason: ALTCHOICE

## 2021-09-20 RX ORDER — CYCLOBENZAPRINE HCL 5 MG
5 TABLET ORAL 3 TIMES DAILY PRN
Qty: 30 TABLET | Refills: 0 | Status: SHIPPED | OUTPATIENT
Start: 2021-09-20 | End: 2021-09-30

## 2021-09-20 SDOH — ECONOMIC STABILITY: FOOD INSECURITY: WITHIN THE PAST 12 MONTHS, YOU WORRIED THAT YOUR FOOD WOULD RUN OUT BEFORE YOU GOT MONEY TO BUY MORE.: PATIENT DECLINED

## 2021-09-20 SDOH — ECONOMIC STABILITY: FOOD INSECURITY: WITHIN THE PAST 12 MONTHS, THE FOOD YOU BOUGHT JUST DIDN'T LAST AND YOU DIDN'T HAVE MONEY TO GET MORE.: PATIENT DECLINED

## 2021-09-20 ASSESSMENT — ENCOUNTER SYMPTOMS
VOMITING: 0
CHEST TIGHTNESS: 0
EYE DISCHARGE: 0
SHORTNESS OF BREATH: 0
NAUSEA: 0
COUGH: 0
SORE THROAT: 0

## 2021-09-20 ASSESSMENT — PATIENT HEALTH QUESTIONNAIRE - PHQ9
SUM OF ALL RESPONSES TO PHQ QUESTIONS 1-9: 0
2. FEELING DOWN, DEPRESSED OR HOPELESS: 0
SUM OF ALL RESPONSES TO PHQ9 QUESTIONS 1 & 2: 0
SUM OF ALL RESPONSES TO PHQ QUESTIONS 1-9: 0
1. LITTLE INTEREST OR PLEASURE IN DOING THINGS: 0
SUM OF ALL RESPONSES TO PHQ QUESTIONS 1-9: 0

## 2021-09-20 ASSESSMENT — SOCIAL DETERMINANTS OF HEALTH (SDOH): HOW HARD IS IT FOR YOU TO PAY FOR THE VERY BASICS LIKE FOOD, HOUSING, MEDICAL CARE, AND HEATING?: PATIENT DECLINED

## 2021-09-20 NOTE — PATIENT INSTRUCTIONS
Patient Education        Costochondritis: Care Instructions  Your Care Instructions  You have chest pain because the cartilage of your rib cage is inflamed. This problem is called costochondritis. This type of chest wall pain may last from days to weeks. It is not a heart problem. Sometimes costochondritis occurs with a cold or the flu, and other times the exact cause is not known. Follow-up care is a key part of your treatment and safety. Be sure to make and go to all appointments, and call your doctor if you are having problems. It's also a good idea to know your test results and keep a list of the medicines you take. How can you care for yourself at home? · Take medicines for pain and inflammation exactly as directed. ? If the doctor gave you a prescription medicine, take it as prescribed. ? If you are not taking a prescription pain medicine, ask your doctor if you can take an over-the-counter medicine. ? Do not take two or more pain medicines at the same time unless the doctor told you to. Many pain medicines have acetaminophen, which is Tylenol. Too much acetaminophen (Tylenol) can be harmful. · It may help to use a warm compress or heating pad (set on low) on your chest. You can also try alternating heat and ice. Put ice or a cold pack on the area for 10 to 20 minutes at a time. Put a thin cloth between the ice and your skin. · Avoid any activity that strains the chest area. As your pain gets better, you can slowly return to your normal activities. · Do not use tape, an elastic bandage, a \"rib belt,\" or anything else that restricts your chest wall motion. When should you call for help? Call 911 anytime you think you may need emergency care. For example, call if:    · You have new or different chest pain or pressure. This may occur with:  ? Sweating. ? Shortness of breath. ? Nausea or vomiting. ? Pain that spreads from the chest to the neck, jaw, or one or both shoulders or arms.   ? Dizziness or lightheadedness. ? A fast or uneven pulse. After calling 911, chew 1 adult-strength aspirin. Wait for an ambulance. Do not try to drive yourself.     · You have severe trouble breathing. Call your doctor now or seek immediate medical care if:    · You have a fever or cough.     · You have any trouble breathing.     · Your chest pain gets worse. Watch closely for changes in your health, and be sure to contact your doctor if:    · Your chest pain continues even though you are taking anti-inflammatory medicine.     · Your chest wall pain has not improved after 5 to 7 days. Where can you learn more? Go to https://[email or web address]. org and sign in to your [employer] account. Enter V986 in the [employer] box to learn more about \"Costochondritis: Care Instructions. \"     If you do not have an account, please click on the \"Sign Up Now\" link. Current as of: October 19, 2020               Content Version: 12.9  © 2006-2021 [employer]wise[employer]. Care instructions adapted under license by [facility] ([facility]). If you have questions about a medical condition or this instruction, always ask your healthcare professional. [place] [address] any warranty or liability for your use of this information.

## 2021-09-20 NOTE — PROGRESS NOTES
[address] [place]ALK[facility]  [address] [place] [facility] [address]  [address] [address]  Dept: [phone]  Dept Fax: [phone]    Paramjit Andrews is a 46 y.o. female who presents today for her medical conditions/complaints of   Chief Complaint   Patient presents with    Chest Pain     sharp burning pain x3 wks, only happens when she moves upper body, been struggling since july          HPI:     BP (!) 128/90   Pulse 68   Temp 98.2 °F (36.8 °C)   Resp 14   Wt 196 lb 6.4 oz (89.1 kg)   SpO2 98%   Breastfeeding No   BMI 34.79 kg/m²       HPI  Pt presented to the clinic today with c/o chest pain. This is a new problem. The current episode started 3 weeks ago. The problem has been unchanged since onset. Associated symptoms include: muscle spasm . Pertinent negatives include: No fever, chills, nausea, vomiting, SOB, abdominal pain . Pt has tried ice and heat, Motrin with little improvement. Pain is described as burning, sharp that occurs with movement. Tender to touch of breast bone. Did go to the gym- lifted weights, rode bike and symptoms were aggravated. Past Medical History:   Diagnosis Date    Cancer (HCC)-skin     Fibromyalgia     Thyroid nodule         Past Surgical History:   Procedure Laterality Date    SKIN CANCER EXCISION      THYROIDECTOMY         No family history on file. Social History     Tobacco Use    Smoking status: Never Smoker    Smokeless tobacco: Never Used   Substance Use Topics    Alcohol use: Not Currently        Prior to Visit Medications    Medication Sig Taking?  Authorizing Provider   pantoprazole (PROTONIX) 40 MG tablet  Yes Historical Provider, MD   Calcium Carbonate-Vitamin D (OYSTER SHELL CALCIUM/D) 500-200 MG-UNIT TABS Take 1 tablet by mouth 3 times daily Yes Historical Provider, MD   meloxicam (MOBIC) 7.5 MG tablet TAKE 1 TABLET BY MOUTH TWICE DAILY AS NEEDED FOR PAIN Yes Historical Provider, MD   predniSONE (DELTASONE) 20 MG tablet Take 1 tablet by mouth daily Take 3 tabs po x 2 days, 2 tabs po x 3 days, 1 tab po x 2 days, half tab po x 2 days Yes Wilburt Councilman, APRN - CNP   cyclobenzaprine (FLEXERIL) 5 MG tablet Take 1 tablet by mouth 3 times daily as needed for Muscle spasms Yes Wilburt Councilman, APRN - CNP   thyroid (ARMOUR THYROID) 90 MG tablet Take 90 mg by mouth daily Yes Historical Provider, MD   ARMFERNANDA THYROID PO Take 15 mg by mouth Daily Yes Historical Provider, MD   pantoprazole sodium (PROTONIX) 40 MG PACK packet 1 tablet Yes Historical Provider, MD   LORazepam (ATIVAN) 0.5 MG tablet LORazepam 0.5 MG Oral Tablet  TAKE 1 TABLET DAILY AS NEEDED. Quantity: 30 Refills: 0     Layla Genin D.O.; Active Yes Historical Provider, MD       Allergies   Allergen Reactions    Penicillins Other (See Comments)    Sudafed  [Pseudoephedrine Hcl] Other (See Comments)         Subjective:      Review of Systems   Constitutional: Negative for chills and fever. HENT: Negative for congestion and sore throat. Eyes: Negative for discharge and visual disturbance. Respiratory: Negative for cough, chest tightness and shortness of breath. Cardiovascular: Positive for chest pain (sternal pain). Gastrointestinal: Negative for nausea and vomiting. Genitourinary: Negative for decreased urine volume and difficulty urinating. Musculoskeletal: Negative for gait problem and myalgias. Skin: Negative for rash. Neurological: Negative for weakness, light-headedness and headaches. Psychiatric/Behavioral: Negative for sleep disturbance. Objective:     Physical Exam  Vitals and nursing note reviewed. Constitutional:       General: She is not in acute distress. Appearance: Normal appearance. HENT:      Head: Normocephalic and atraumatic.       Right Ear: Ear canal and external ear normal.      Left Ear: Ear canal and external ear normal.      Nose: Nose normal.      Mouth/Throat:      Mouth: Mucous membranes are moist.   Eyes: Extraocular Movements: Extraocular movements intact. Conjunctiva/sclera: Conjunctivae normal.   Cardiovascular:      Rate and Rhythm: Normal rate and regular rhythm. Pulses: Normal pulses. Comments: Tenderness to palpation to stereum. No open areas. No erythema. Pulmonary:      Effort: Pulmonary effort is normal.      Breath sounds: Normal breath sounds. Abdominal:      Palpations: Abdomen is soft. Musculoskeletal:         General: Normal range of motion. Cervical back: Normal range of motion and neck supple. Skin:     General: Skin is warm and dry. Capillary Refill: Capillary refill takes less than 2 seconds. Findings: No rash. Neurological:      Mental Status: She is alert and oriented to person, place, and time. Coordination: Coordination normal.      Gait: Gait normal.   Psychiatric:         Mood and Affect: Mood normal.         Behavior: Behavior normal.         Thought Content: Thought content normal.           MEDICAL DECISION MAKING Assessment/Plan:     Pallavi Guan was seen today for chest pain. Diagnoses and all orders for this visit:    Acute costochondritis  -     predniSONE (DELTASONE) 20 MG tablet; Take 1 tablet by mouth daily Take 3 tabs po x 2 days, 2 tabs po x 3 days, 1 tab po x 2 days, half tab po x 2 days  -     cyclobenzaprine (FLEXERIL) 5 MG tablet; Take 1 tablet by mouth 3 times daily as needed for Muscle spasms    Muscle pain  -     predniSONE (DELTASONE) 20 MG tablet; Take 1 tablet by mouth daily Take 3 tabs po x 2 days, 2 tabs po x 3 days, 1 tab po x 2 days, half tab po x 2 days  -     cyclobenzaprine (FLEXERIL) 5 MG tablet;  Take 1 tablet by mouth 3 times daily as needed for Muscle spasms    Musculoskeletal pain        Results for orders placed or performed during the hospital encounter of 07/22/21   CBC Auto Differential   Result Value Ref Range    WBC 11.3 (H) 3.5 - 11.0 k/uL    RBC 4.01 4.0 - 5.2 m/uL    Hemoglobin 12.0 12.0 - 16.0 g/dL Hematocrit 36.1 36 - 46 %    MCV 90.1 80 - 100 fL    MCH 30.0 26 - 34 pg    MCHC 33.3 31 - 37 g/dL    RDW 13.5 12.5 - 15.4 %    Platelets 664 772 - 660 k/uL    MPV 8.5 6.0 - 12.0 fL    NRBC Automated NOT REPORTED per 100 WBC    Differential Type NOT REPORTED     Seg Neutrophils 55 36 - 66 %    Lymphocytes 33 24 - 44 %    Monocytes 10 2 - 11 %    Eosinophils % 1 1 - 4 %    Basophils 1 0 - 2 %    Immature Granulocytes NOT REPORTED 0 %    Segs Absolute 6.20 1.8 - 7.7 k/uL    Absolute Lymph # 3.80 1.0 - 4.8 k/uL    Absolute Mono # 1.10 0.1 - 1.2 k/uL    Absolute Eos # 0.10 0.0 - 0.4 k/uL    Basophils Absolute 0.10 0.0 - 0.2 k/uL    Absolute Immature Granulocyte NOT REPORTED 0.00 - 0.30 k/uL    WBC Morphology NOT REPORTED     RBC Morphology NOT REPORTED     Platelet Estimate NOT REPORTED    Comprehensive Metabolic Panel w/ Reflex to MG   Result Value Ref Range    Glucose 107 (H) 70 - 99 mg/dL    BUN 18 6 - 20 mg/dL    CREATININE 0.58 0.50 - 0.90 mg/dL    Bun/Cre Ratio NOT REPORTED 9 - 20    Calcium 9.2 8.6 - 10.4 mg/dL    Sodium 139 135 - 144 mmol/L    Potassium 4.1 3.7 - 5.3 mmol/L    Chloride 105 98 - 107 mmol/L    CO2 26 20 - 31 mmol/L    Anion Gap 8 (L) 9 - 17 mmol/L    Alkaline Phosphatase 76 35 - 104 U/L    ALT 14 5 - 33 U/L    AST 16 <32 U/L    Total Bilirubin 0.23 (L) 0.3 - 1.2 mg/dL    Total Protein 7.0 6.4 - 8.3 g/dL    Albumin 3.8 3.5 - 5.2 g/dL    Albumin/Globulin Ratio 1.2 1.0 - 2.5    GFR Non-African American >60 >60 mL/min    GFR African American >60 >60 mL/min    GFR Comment          GFR Staging NOT REPORTED    Troponin   Result Value Ref Range    Troponin, High Sensitivity <6 0 - 14 ng/L    Troponin T NOT REPORTED <0.03 ng/mL    Troponin Interp NOT REPORTED    D-Dimer, Quantitative   Result Value Ref Range    D-Dimer, Quant 0.21 mg/L FEU   HCG Qualitative, Serum   Result Value Ref Range    hCG Qual NEGATIVE NEGATIVE   TSH without Reflex   Result Value Ref Range    TSH 0.08 (L) 0.30 - 5.00 mIU/L   T4, Free   Result Value Ref Range    Thyroxine, Free 1.28 0.93 - 1.70 ng/dL   Troponin   Result Value Ref Range    Troponin, High Sensitivity <6 0 - 14 ng/L    Troponin T NOT REPORTED <0.03 ng/mL    Troponin Interp NOT REPORTED    T3, Free   Result Value Ref Range    T3, Free 3.23 2.02 - 4.43 pg/mL   EKG 12 Lead   Result Value Ref Range    Ventricular Rate 70 BPM    Atrial Rate 70 BPM    P-R Interval 160 ms    QRS Duration 84 ms    Q-T Interval 396 ms    QTc Calculation (Bazett) 427 ms    P Axis 3 degrees    R Axis 23 degrees    T Axis 26 degrees   EKG 12 Lead   Result Value Ref Range    Ventricular Rate 83 BPM    Atrial Rate 83 BPM    P-R Interval 166 ms    QRS Duration 80 ms    Q-T Interval 372 ms    QTc Calculation (Bazett) 437 ms    P Axis 48 degrees    R Axis 5 degrees    T Axis 28 degrees     Based on the history and exam, will treat Lynsey was seen today for costochondritis / Musculoskeletal pain. Pt to fill and take medications as ordered. Return if no improvement. Go to the ER for any emergent concern. Plan follow up with PCP. Patient given educational materials - see patientinstructions. Discussed use, benefit, and side effects of prescribed medications. All patient questions answered. Pt verbalized understanding. Instructed to continue current medications, diet and exercise. Patient agreed with treatment plan. Follow up as directed.      Electronically signed by LEEANNA Kilgore CNP on 9/20/2021 at 7:12 PM

## 2021-10-05 ENCOUNTER — OFFICE VISIT (OUTPATIENT)
Dept: PRIMARY CARE CLINIC | Age: 41
End: 2021-10-05
Payer: COMMERCIAL

## 2021-10-05 VITALS
HEART RATE: 110 BPM | BODY MASS INDEX: 34.37 KG/M2 | RESPIRATION RATE: 14 BRPM | DIASTOLIC BLOOD PRESSURE: 80 MMHG | OXYGEN SATURATION: 99 % | WEIGHT: 194 LBS | SYSTOLIC BLOOD PRESSURE: 108 MMHG

## 2021-10-05 DIAGNOSIS — R92.8 ABNORMAL MAMMOGRAM OF RIGHT BREAST: ICD-10-CM

## 2021-10-05 DIAGNOSIS — E89.0 POSTOPERATIVE HYPOTHYROIDISM: Primary | ICD-10-CM

## 2021-10-05 DIAGNOSIS — Z85.850 HISTORY OF THYROID CANCER: ICD-10-CM

## 2021-10-05 DIAGNOSIS — N64.4 BREAST PAIN: ICD-10-CM

## 2021-10-05 PROBLEM — K21.9 GASTROESOPHAGEAL REFLUX DISEASE WITHOUT ESOPHAGITIS: Status: ACTIVE | Noted: 2021-05-12

## 2021-10-05 PROBLEM — E04.2 NONTOXIC MULTINODULAR GOITER: Status: RESOLVED | Noted: 2017-12-21 | Resolved: 2021-10-05

## 2021-10-05 PROCEDURE — 1036F TOBACCO NON-USER: CPT | Performed by: NURSE PRACTITIONER

## 2021-10-05 PROCEDURE — 99214 OFFICE O/P EST MOD 30 MIN: CPT | Performed by: NURSE PRACTITIONER

## 2021-10-05 PROCEDURE — G8427 DOCREV CUR MEDS BY ELIG CLIN: HCPCS | Performed by: NURSE PRACTITIONER

## 2021-10-05 PROCEDURE — G8417 CALC BMI ABV UP PARAM F/U: HCPCS | Performed by: NURSE PRACTITIONER

## 2021-10-05 PROCEDURE — G8484 FLU IMMUNIZE NO ADMIN: HCPCS | Performed by: NURSE PRACTITIONER

## 2021-10-05 RX ORDER — VITAMIN E 268 MG
180 CAPSULE ORAL DAILY
COMMUNITY
End: 2022-03-27

## 2021-10-05 RX ORDER — TIZANIDINE 2 MG/1
TABLET ORAL
COMMUNITY
Start: 2021-09-13

## 2021-10-05 ASSESSMENT — PATIENT HEALTH QUESTIONNAIRE - PHQ9
SUM OF ALL RESPONSES TO PHQ QUESTIONS 1-9: 0
SUM OF ALL RESPONSES TO PHQ QUESTIONS 1-9: 0
1. LITTLE INTEREST OR PLEASURE IN DOING THINGS: 0
SUM OF ALL RESPONSES TO PHQ9 QUESTIONS 1 & 2: 0
SUM OF ALL RESPONSES TO PHQ QUESTIONS 1-9: 0
2. FEELING DOWN, DEPRESSED OR HOPELESS: 0

## 2021-10-05 ASSESSMENT — ENCOUNTER SYMPTOMS
SORE THROAT: 0
SINUS PRESSURE: 0
NAUSEA: 0
DIARRHEA: 0
BLOOD IN STOOL: 0
TROUBLE SWALLOWING: 0
CONSTIPATION: 0
WHEEZING: 0
COUGH: 0
ABDOMINAL PAIN: 0
SHORTNESS OF BREATH: 0
VOMITING: 0

## 2021-10-05 NOTE — PROGRESS NOTES
[facility] PRIMARY CARE  [facility] [address] [place]  [address]  [address] [address]  Dept: [phone]  Dept Fax: [phone]    Merary Noble is a 46 y.o. female who presentstoday for her medical conditions/complaints as noted below.   Merary Noble is c/o of  Chief Complaint   Patient presents with    Discuss Medications     supplements for breast pain, got sick taking supplements         HPI:     Here today for follow up and to update on her changes in her medical conditions  Since her initial visit Dec of 20 she has gone through thyroidectomy, found CA but reports \"they got it all\"  Has not required any further treatment  She will continue regular follow-up with [facility] endocrinologist    She also had an abnormal mammogram and has started several supplements in an effort to prevent breast CA  She reports she developed breast pain after her thyroidectomy and went to GYN for evaluation  No lump was felt the provider agreed to order mammogram  Questionable lesion was noted on the left so she had to return for ultrasound, she will also be having further evaluation with MRI  Has been seeing Faviola Ramos CNP through [facility] who works with breast specialist  Asking about several supplements that she read about online and was trying for the breast discomfort that she has bilaterally  She reports the pain is intermittent but always present to some degree, seems to worsen premenstrually  She is opposed to any hormonal remedies and therefore was exploring natural and herbal options       No results found for: LABA1C          ( goal A1C is < 7)   No results found for: LABMICR  No results found for: LDLCHOLESTEROL, LDLCALC    (goal LDL is <100)   AST (U/L)   Date Value   2021 16     ALT (U/L)   Date Value   2021 14     BUN (mg/dL)   Date Value   2021 18     BP Readings from Last 3 Encounters:   10/05/21 108/80   21 (!) 128/90   21 125/84 ([id number])    Past Medical History:   Diagnosis Date    Cancer (HCC)-skin     Fibromyalgia     Thyroid nodule       Past Surgical History:   Procedure Laterality Date    SKIN CANCER EXCISION      THYROIDECTOMY         History reviewed. No pertinent family history. Social History     Tobacco Use    Smoking status: Never Smoker    Smokeless tobacco: Never Used   Substance Use Topics    Alcohol use: Not Currently      Current Outpatient Medications   Medication Sig Dispense Refill    tiZANidine (ZANAFLEX) 2 MG tablet TAKE 1 TABLET BY MOUTH THREE TIMES DAILY AS NEEDED      MAGNESIUM GLYCINATE PO Take 400 mg by mouth daily as needed      Green Tea 250 MG CAPS Take 250 mg by mouth daily as needed      vitamin E 180 MG (400 UNIT) CAPS capsule Take 180 mg by mouth daily      pantoprazole (PROTONIX) 40 MG tablet       meloxicam (MOBIC) 7.5 MG tablet TAKE 1 TABLET BY MOUTH TWICE DAILY AS NEEDED FOR PAIN      thyroid (ARMOUR THYROID) 90 MG tablet Take 90 mg by mouth daily      pantoprazole sodium (PROTONIX) 40 MG PACK packet 1 tablet      LORazepam (ATIVAN) 0.5 MG tablet LORazepam 0.5 MG Oral Tablet  TAKE 1 TABLET DAILY AS NEEDED. Quantity: 30 Refills: 0     Alireza Richer D.O.; Active      Calcium Carbonate-Vitamin D (OYSTER SHELL CALCIUM/D) 500-200 MG-UNIT TABS Take 1 tablet by mouth 3 times daily (Patient not taking: Reported on 10/5/2021)       No current facility-administered medications for this visit.      Allergies   Allergen Reactions    Penicillins Other (See Comments)    Sudafed  [Pseudoephedrine Hcl] Other (See Comments)       Health Maintenance   Topic Date Due    Hepatitis C screen  Never done    Varicella vaccine (1 of 2 - 2-dose childhood series) Never done    A1C test (Diabetic or Prediabetic)  Never done    HIV screen  Never done    DTaP/Tdap/Td vaccine (1 - Tdap) Never done    Cervical cancer screen  Never done    Flu vaccine (1) Never done    TSH testing Cervical back: Normal range of motion. Skin:     General: Skin is warm and dry. Neurological:      Mental Status: She is alert and oriented to person, place, and time. Psychiatric:         Behavior: Behavior normal.         Thought Content: Thought content normal.         Judgment: Judgment normal.       /80   Pulse 110   Resp 14   Wt 194 lb (88 kg)   SpO2 99%   Breastfeeding No   BMI 34.37 kg/m²     Assessment:       Diagnosis Orders   1. Postoperative hypothyroidism     2. History of thyroid cancer     3. Abnormal mammogram of right breast     4. Breast pain               Plan:      Return if symptoms worsen or fail to improve. Postop hypothyroidism, history of thyroid cancer-reviewed notes, labs and diagnostics from [facility] clinic. She is stable at this time and will continue follow-up with [facility] as directed  Abnormal mammogram right breast, breast pain-reviewed recent mammograms under care everywhere. She has established with a breast surgeon's office and will continue to follow-up with them as she can for further testing and ongoing monitoring. Reviewed multiple herbal/vitamin supplements that she has purchased in an effort to better manage her breast discomfort and prevent development cancer. Advised to avoid the multivitamin supplement labeled \"thyroid supplement\" until approved by her endocrinologist..  Discussed other supplements will likely be safe but not necessarily beneficial, encouraged her to continue vitamin E and calcium with vitamin D         Patient given educational materials - see patient instructions. Discussed use, benefit, and side effects of prescribed medications. All patientquestions answered. Pt voiced understanding. Reviewed health maintenance. Instructedto continue current medications, diet and exercise. Patient agreed with treatmentplan. Follow up as directed.      Electronicallysigned by LEEANNA Dillard CNP on 10/5/2021 at 4:17 PM

## 2021-10-10 ENCOUNTER — NURSE TRIAGE (OUTPATIENT)
Dept: OTHER | Facility: CLINIC | Age: 41
End: 2021-10-10

## 2021-10-10 NOTE — TELEPHONE ENCOUNTER
Reason for Disposition   Abdominal pain is a chronic symptom (recurrent or ongoing AND present > 4 weeks)    Answer Assessment - Initial Assessment Questions  1. LOCATION: \"Where does it hurt? \"       Upper left, on rib    2. RADIATION: \"Does the pain shoot anywhere else? \" (e.g., chest, back)      Pain sometimes radiates up towards breasts    3. ONSET: \"When did the pain begin? \" (e.g., minutes, hours or days ago)       On and off for several months. About 3-4 weeks ago    4. SUDDEN: \"Gradual or sudden onset? \"      Gradual onset  5. PATTERN \"Does the pain come and go, or is it constant? \"     - If constant: \"Is it getting better, staying the same, or worsening? \"       (Note: Constant means the pain never goes away completely; most serious pain is constant and it progresses)      - If intermittent: \"How long does it last?\" \"Do you have pain now? \"      (Note: Intermittent means the pain goes away completely between bouts)     Intermittent- mostly with activity or pressure, but sometimes happens at rest.    6. SEVERITY: \"How bad is the pain? \"  (e.g., Scale 1-10; mild, moderate, or severe)    - MILD (1-3): doesn't interfere with normal activities, abdomen soft and not tender to touch     - MODERATE (4-7): interferes with normal activities or awakens from sleep, tender to touch     - SEVERE (8-10): excruciating pain, doubled over, unable to do any normal activities       6/10 when it happens    7. RECURRENT SYMPTOM: \"Have you ever had this type of abdominal pain before? \" If so, ask: \"When was the last time? \" and \"What happened that time? \"       Yes- steroids, dx costochondritis    8. CAUSE: \"What do you think is causing the abdominal pain? \"      See above    9. RELIEVING/AGGRAVATING FACTORS: \"What makes it better or worse? \" (e.g., movement, antacids, bowel movement)      Sometimes direct pressure for about 1 minute helps. Ice helps. Movement makes it worse. Ibuprofen does not help. Naproxen doesn't help.     10. OTHER SYMPTOMS: \"Has there been any vomiting, diarrhea, constipation, or urine problems? \"        Denies    11. PREGNANCY: \"Is there any chance you are pregnant? \" \"When was your last menstrual period? \"        LMP - 1 week ago    Protocols used: ABDOMINAL PAIN [facility]    Brief description of triage: Patient experiencing lower left rib pain. Has been diagnosed with costochondritis and has received multiple courses of steroids but pain returns. Was last treated for the pain 9/20/21 and finished steroid course 09/27/21. Pain has since returned over the last couple of weeks. Triage indicates for patient to See PCP within 2 weeks. Care advice provided, patient verbalizes understanding; denies any other questions or concerns; instructed to call back for any new or worsening symptoms. This triage is a result of a call to [address] [place] [address]. Please do not respond to the triage nurse through this encounter. Any subsequent communication should be directly with the patient.

## 2021-10-12 ENCOUNTER — APPOINTMENT (OUTPATIENT)
Dept: CT IMAGING | Age: 41
End: 2021-10-12
Payer: COMMERCIAL

## 2021-10-12 ENCOUNTER — HOSPITAL ENCOUNTER (EMERGENCY)
Age: 41
Discharge: HOME | End: 2021-10-12
Attending: EMERGENCY MEDICINE
Payer: COMMERCIAL

## 2021-10-12 VITALS
HEART RATE: 75 BPM | RESPIRATION RATE: 18 BRPM | OXYGEN SATURATION: 99 % | DIASTOLIC BLOOD PRESSURE: 95 MMHG | TEMPERATURE: 98 F | BODY MASS INDEX: 33.49 KG/M2 | SYSTOLIC BLOOD PRESSURE: 140 MMHG | WEIGHT: 189 LBS | HEIGHT: 63 IN

## 2021-10-12 DIAGNOSIS — R10.13 EPIGASTRIC PAIN: Primary | ICD-10-CM

## 2021-10-12 LAB
ABSOLUTE EOS #: 0 K/UL (ref 0–0.4)
ABSOLUTE IMMATURE GRANULOCYTE: ABNORMAL K/UL (ref 0–0.3)
ABSOLUTE LYMPH #: 2 K/UL (ref 1–4.8)
ABSOLUTE MONO #: 0.5 K/UL (ref 0.1–1.2)
ALBUMIN SERPL-MCNC: 3.9 G/DL (ref 3.5–5.2)
ALBUMIN/GLOBULIN RATIO: 1.5 (ref 1–2.5)
ALP BLD-CCNC: 70 U/L (ref 35–104)
ALT SERPL-CCNC: <5 U/L (ref 5–33)
AMYLASE: 36 U/L (ref 28–100)
AST SERPL-CCNC: 11 U/L
BASOPHILS # BLD: 1 % (ref 0–2)
BASOPHILS ABSOLUTE: 0.1 K/UL (ref 0–0.2)
BILIRUB SERPL-MCNC: 0.37 MG/DL (ref 0.3–1.2)
BILIRUBIN DIRECT: 0.11 MG/DL
BILIRUBIN URINE: NEGATIVE
BILIRUBIN, INDIRECT: 0.26 MG/DL (ref 0–1)
COLOR: YELLOW
COMMENT UA: ABNORMAL
DIFFERENTIAL TYPE: ABNORMAL
EOSINOPHILS RELATIVE PERCENT: 0 % (ref 1–4)
GLOBULIN: ABNORMAL G/DL (ref 1.5–3.8)
GLUCOSE URINE: NEGATIVE
HCG QUALITATIVE: NEGATIVE
HCT VFR BLD CALC: 38.7 % (ref 36–46)
HEMOGLOBIN: 12.9 G/DL (ref 12–16)
IMMATURE GRANULOCYTES: ABNORMAL %
KETONES, URINE: ABNORMAL
LEUKOCYTE ESTERASE, URINE: NEGATIVE
LIPASE: 21 U/L (ref 13–60)
LYMPHOCYTES # BLD: 21 % (ref 24–44)
MCH RBC QN AUTO: 30.6 PG (ref 26–34)
MCHC RBC AUTO-ENTMCNC: 33.5 G/DL (ref 31–37)
MCV RBC AUTO: 91.3 FL (ref 80–100)
MONOCYTES # BLD: 6 % (ref 2–11)
NITRITE, URINE: NEGATIVE
NRBC AUTOMATED: ABNORMAL PER 100 WBC
PDW BLD-RTO: 13.2 % (ref 12.5–15.4)
PH UA: 6 (ref 5–8)
PLATELET # BLD: 388 K/UL (ref 140–450)
PLATELET ESTIMATE: ABNORMAL
PMV BLD AUTO: 7.6 FL (ref 6–12)
PROTEIN UA: NEGATIVE
RBC # BLD: 4.24 M/UL (ref 4–5.2)
RBC # BLD: ABNORMAL 10*6/UL
SEG NEUTROPHILS: 72 % (ref 36–66)
SEGMENTED NEUTROPHILS ABSOLUTE COUNT: 6.6 K/UL (ref 1.8–7.7)
SPECIFIC GRAVITY UA: 1.02 (ref 1–1.03)
TOTAL PROTEIN: 6.5 G/DL (ref 6.4–8.3)
TSH SERPL DL<=0.05 MIU/L-ACNC: 2.47 MIU/L (ref 0.3–5)
TURBIDITY: CLEAR
URINE HGB: NEGATIVE
UROBILINOGEN, URINE: NORMAL
WBC # BLD: 9.2 K/UL (ref 3.5–11)
WBC # BLD: ABNORMAL 10*3/UL

## 2021-10-12 PROCEDURE — 99285 EMERGENCY DEPT VISIT HI MDM: CPT

## 2021-10-12 PROCEDURE — 84703 CHORIONIC GONADOTROPIN ASSAY: CPT

## 2021-10-12 PROCEDURE — 96361 HYDRATE IV INFUSION ADD-ON: CPT

## 2021-10-12 PROCEDURE — 82150 ASSAY OF AMYLASE: CPT

## 2021-10-12 PROCEDURE — 6360000002 HC RX W HCPCS: Performed by: EMERGENCY MEDICINE

## 2021-10-12 PROCEDURE — 96376 TX/PRO/DX INJ SAME DRUG ADON: CPT

## 2021-10-12 PROCEDURE — 36415 COLL VENOUS BLD VENIPUNCTURE: CPT

## 2021-10-12 PROCEDURE — 85025 COMPLETE CBC W/AUTO DIFF WBC: CPT

## 2021-10-12 PROCEDURE — 83690 ASSAY OF LIPASE: CPT

## 2021-10-12 PROCEDURE — 74177 CT ABD & PELVIS W/CONTRAST: CPT

## 2021-10-12 PROCEDURE — 80048 BASIC METABOLIC PNL TOTAL CA: CPT

## 2021-10-12 PROCEDURE — 81003 URINALYSIS AUTO W/O SCOPE: CPT

## 2021-10-12 PROCEDURE — 96374 THER/PROPH/DIAG INJ IV PUSH: CPT

## 2021-10-12 PROCEDURE — 84443 ASSAY THYROID STIM HORMONE: CPT

## 2021-10-12 PROCEDURE — 80076 HEPATIC FUNCTION PANEL: CPT

## 2021-10-12 PROCEDURE — 2580000003 HC RX 258: Performed by: EMERGENCY MEDICINE

## 2021-10-12 PROCEDURE — 6360000004 HC RX CONTRAST MEDICATION: Performed by: EMERGENCY MEDICINE

## 2021-10-12 RX ORDER — 0.9 % SODIUM CHLORIDE 0.9 %
80 INTRAVENOUS SOLUTION INTRAVENOUS ONCE
Status: COMPLETED | OUTPATIENT
Start: 2021-10-12 | End: 2021-10-12

## 2021-10-12 RX ORDER — KETOROLAC TROMETHAMINE 15 MG/ML
15 INJECTION, SOLUTION INTRAMUSCULAR; INTRAVENOUS ONCE
Status: COMPLETED | OUTPATIENT
Start: 2021-10-12 | End: 2021-10-12

## 2021-10-12 RX ORDER — 0.9 % SODIUM CHLORIDE 0.9 %
1000 INTRAVENOUS SOLUTION INTRAVENOUS ONCE
Status: COMPLETED | OUTPATIENT
Start: 2021-10-12 | End: 2021-10-12

## 2021-10-12 RX ORDER — OXYCODONE HYDROCHLORIDE AND ACETAMINOPHEN 5; 325 MG/1; MG/1
1-2 TABLET ORAL EVERY 6 HOURS PRN
Qty: 15 TABLET | Refills: 0 | Status: SHIPPED | OUTPATIENT
Start: 2021-10-12 | End: 2021-10-19

## 2021-10-12 RX ORDER — SODIUM CHLORIDE 0.9 % (FLUSH) 0.9 %
10 SYRINGE (ML) INJECTION PRN
Status: DISCONTINUED | OUTPATIENT
Start: 2021-10-12 | End: 2021-10-12 | Stop reason: HOSPADM

## 2021-10-12 RX ADMIN — KETOROLAC TROMETHAMINE 15 MG: 15 INJECTION, SOLUTION INTRAMUSCULAR; INTRAVENOUS at 09:37

## 2021-10-12 RX ADMIN — SODIUM CHLORIDE 1000 ML: 9 INJECTION, SOLUTION INTRAVENOUS at 09:37

## 2021-10-12 RX ADMIN — SODIUM CHLORIDE, PRESERVATIVE FREE 10 ML: 5 INJECTION INTRAVENOUS at 10:36

## 2021-10-12 RX ADMIN — SODIUM CHLORIDE 80 ML: 9 INJECTION, SOLUTION INTRAVENOUS at 10:35

## 2021-10-12 RX ADMIN — IOPAMIDOL 75 ML: 755 INJECTION, SOLUTION INTRAVENOUS at 10:35

## 2021-10-12 RX ADMIN — KETOROLAC TROMETHAMINE 15 MG: 15 INJECTION, SOLUTION INTRAMUSCULAR; INTRAVENOUS at 11:58

## 2021-10-12 ASSESSMENT — PAIN DESCRIPTION - PAIN TYPE: TYPE: CHRONIC PAIN;ACUTE PAIN

## 2021-10-12 ASSESSMENT — PAIN SCALES - GENERAL
PAINLEVEL_OUTOF10: 7
PAINLEVEL_OUTOF10: 7

## 2021-10-12 NOTE — ED TRIAGE NOTES
Patient arrives with c/o left sided upper abdominal pain, loss of appetite, and diarrhea. Unable to get in with GI specialist until November. Hx hiatal hernia. Pain has progressed over the last 4 months.

## 2021-10-12 NOTE — ED PROVIDER NOTES
[address] [facility]  [address] [facility] [address] [facility] [address]  Phone: [phone]  Fax: Andreia Kovacs 2281      Pt Name: Jennifer Phipps  MRN: [id number]  [place] 1980  Date of evaluation: 10/12/2021    CHIEF COMPLAINT       Chief Complaint   Patient presents with    Abdominal Pain    Diarrhea       HISTORY OF PRESENT ILLNESS    Jennifer Phipps is a 46 y.o. female who presents to the emergency room complaining of left upper and epigastric abdominal pain over the past 4 months now worsening. More intense with past week. Admits to associated nausea. History of hiatal hernia had an EGD done in April for an unrelated issue. She denies any diarrhea but has loose stool. Denies any chest pain or shortness of breath. Pain is worse to palpation. Denies any other symptoms. REVIEW OF SYSTEMS       Constitutional: No fevers or chills   HEENT: No sore throat, rhinorrhea, or earache   Eyes: No blurry vision or double vision no drainage   Cardiovascular: No chest pain or tachycardia   Respiratory: No wheezing or shortness of breath no cough   Gastrointestinal: Positive nausea no vomiting positive loose stool no constipation positive abdominal pain  : No hematuria or dysuria   Musculoskeletal: No swelling or pain   Skin: No rash   Neurological: No focal neurologic complaints, paresthesias, weakness, or headache     PAST MEDICAL HISTORY    has a past medical history of Cancer (HCC)-skin, Fibromyalgia, and Thyroid nodule. SURGICAL HISTORY      has a past surgical history that includes Skin cancer excision and Thyroidectomy. CURRENT MEDICATIONS       Previous Medications    CALCIUM CARBONATE-VITAMIN D (OYSTER SHELL CALCIUM/D) 500-200 MG-UNIT TABS    Take 1 tablet by mouth 3 times daily    GREEN  MG CAPS    Take 250 mg by mouth daily as needed    LORAZEPAM (ATIVAN) 0.5 MG TABLET    LORazepam 0.5 MG Oral Tablet  TAKE 1 TABLET DAILY AS NEEDED.    Quantity: 30 Refills: 0     Nel Marisa D.O.; Active    MAGNESIUM GLYCINATE PO    Take 400 mg by mouth daily as needed    MELOXICAM (MOBIC) 7.5 MG TABLET    TAKE 1 TABLET BY MOUTH TWICE DAILY AS NEEDED FOR PAIN    PANTOPRAZOLE (PROTONIX) 40 MG TABLET        PANTOPRAZOLE SODIUM (PROTONIX) 40 MG PACK PACKET    1 tablet    THYROID (ARMOUR THYROID) 90 MG TABLET    Take 90 mg by mouth daily    TIZANIDINE (ZANAFLEX) 2 MG TABLET    TAKE 1 TABLET BY MOUTH THREE TIMES DAILY AS NEEDED    VITAMIN E 180 MG (400 UNIT) CAPS CAPSULE    Take 180 mg by mouth daily       ALLERGIES     is allergic to penicillins and sudafed [pseudoephedrine hcl]. FAMILY HISTORY     has no family status information on file. family history is not on file. SOCIAL HISTORY      reports that she has never smoked. She has never used smokeless tobacco. She reports previous alcohol use. She reports current drug use. Drug: Marijuana. PHYSICAL EXAM       ED Triage Vitals [10/12/21 0841]   BP Temp Temp Source Pulse Resp SpO2 Height Weight   (!) 129/93 98 °F (36.7 °C) Oral 83 18 100 % 5' 3\" (1.6 m) 189 lb (85.7 kg)       Constitutional: Alert, oriented x3, nontoxic, answering questions appropriately, acting properly for age, in no acute distress   HEENT: Extraocular muscles intact,  Neck: Trachea midline   Cardiovascular: Regular rhythm and rate no S3, S4, or murmurs   Respiratory: Clear to auscultation bilaterally no wheezes, rhonchi, rales, no respiratory distress no tachypnea no retractions no hypoxia  Chest wall: Focal tenderness just to the medial aspect of the lower rib  Gastrointestinal: Soft, mild to moderate left upper quadrant and epigastric tenderness to palpation, nondistended, positive bowel sounds. No rebound, rigidity, or guarding.   No abdominal bruit no pulsatile mass  Musculoskeletal: No extremity pain or swelling no calf tenderness or asymmetry  Neurologic: Moving all 4 extremities without difficulty there are no gross focal neurologic deficits   Skin: Warm and dry   Physical Exam  Abdominal:             DIFFERENTIAL DIAGNOSIS/ MDM:     IV fluids labs. Pain control. CT abdomen and pelvis. Pain for the past 4 to 5 months. Worse over the past week and a half.     DIAGNOSTIC RESULTS     EKG: All EKG's are interpreted by the Emergency Department Physician who either signs or Co-signs this chart in the absence of a cardiologist.        Not indicated unless otherwise documented above    LABS:  Results for orders placed or performed during the hospital encounter of 10/12/21   CBC Auto Differential   Result Value Ref Range    WBC 9.2 3.5 - 11.0 k/uL    RBC 4.24 4.0 - 5.2 m/uL    Hemoglobin 12.9 12.0 - 16.0 g/dL    Hematocrit 38.7 36 - 46 %    MCV 91.3 80 - 100 fL    MCH 30.6 26 - 34 pg    MCHC 33.5 31 - 37 g/dL    RDW 13.2 12.5 - 15.4 %    Platelets 114 291 - 892 k/uL    MPV 7.6 6.0 - 12.0 fL    NRBC Automated NOT REPORTED per 100 WBC    Differential Type NOT REPORTED     Seg Neutrophils 72 (H) 36 - 66 %    Lymphocytes 21 (L) 24 - 44 %    Monocytes 6 2 - 11 %    Eosinophils % 0 (L) 1 - 4 %    Basophils 1 0 - 2 %    Immature Granulocytes NOT REPORTED 0 %    Segs Absolute 6.60 1.8 - 7.7 k/uL    Absolute Lymph # 2.00 1.0 - 4.8 k/uL    Absolute Mono # 0.50 0.1 - 1.2 k/uL    Absolute Eos # 0.00 0.0 - 0.4 k/uL    Basophils Absolute 0.10 0.0 - 0.2 k/uL    Absolute Immature Granulocyte NOT REPORTED 0.00 - 0.30 k/uL    WBC Morphology NOT REPORTED     RBC Morphology NOT REPORTED     Platelet Estimate NOT REPORTED    Basic Metabolic Panel   Result Value Ref Range    Glucose 109 (H) 70 - 99 mg/dL    BUN 14 6 - 20 mg/dL    CREATININE 0.58 0.50 - 0.90 mg/dL    Bun/Cre Ratio NOT REPORTED 9 - 20    Calcium 9.0 8.6 - 10.4 mg/dL    Sodium 133 (L) 135 - 144 mmol/L    Potassium 3.8 3.7 - 5.3 mmol/L    Chloride 99 98 - 107 mmol/L    CO2 25 20 - 31 mmol/L    Anion Gap 9 9 - 17 mmol/L    GFR Non-African American >60 >60 mL/min    GFR African American >60 >60 mL/min    GFR Comment          GFR Staging NOT REPORTED    Amylase   Result Value Ref Range    Amylase 36 28 - 100 U/L   Lipase   Result Value Ref Range    Lipase 21 13 - 60 U/L   Hepatic Function Panel   Result Value Ref Range    Albumin 3.9 3.5 - 5.2 g/dL    Alkaline Phosphatase 70 35 - 104 U/L    ALT <5 (L) 5 - 33 U/L    AST 11 <32 U/L    Total Bilirubin 0.37 0.3 - 1.2 mg/dL    Bilirubin, Direct 0.11 <0.31 mg/dL    Bilirubin, Indirect 0.26 0.00 - 1.00 mg/dL    Total Protein 6.5 6.4 - 8.3 g/dL    Globulin NOT REPORTED 1.5 - 3.8 g/dL    Albumin/Globulin Ratio 1.5 1.0 - 2.5   HCG Qualitative, Serum   Result Value Ref Range    hCG Qual NEGATIVE NEGATIVE   Urinalysis Reflex to Culture    Specimen: Urine, clean catch   Result Value Ref Range    Color, UA Yellow Yellow    Turbidity UA Clear Clear    Glucose, Ur NEGATIVE NEGATIVE    Bilirubin Urine NEGATIVE NEGATIVE    Ketones, Urine SMALL (A) NEGATIVE    Specific Gravity, UA 1.021 1.005 - 1.030    Urine Hgb NEGATIVE NEGATIVE    pH, UA 6.0 5.0 - 8.0    Protein, UA NEGATIVE NEGATIVE    Urobilinogen, Urine Normal Normal    Nitrite, Urine NEGATIVE NEGATIVE    Leukocyte Esterase, Urine NEGATIVE NEGATIVE    Urinalysis Comments       Microscopic exam not performed based on chemical results unless requested in original order. Urinalysis Comments          Urinalysis Comments       Utilizing a urinalysis as the only screening method to exclude a potential uropathogen can be unreliable in many patient populations. Rapid screening tests are less sensitive than culture and if UTI is a clinical possibility, culture should be considered despite a negative urinalysis.    TSH with Reflex   Result Value Ref Range    TSH 2.47 0.30 - 5.00 mIU/L       Not indicated unless otherwise documented above    RADIOLOGY:   I reviewed the radiologist interpretations:    CT ABDOMEN PELVIS W IV CONTRAST Additional Contrast? None   Final Result   Negative CT examination of the abdomen and pelvis with no evidence of acute   process. RECOMMENDATIONS:   2.3 cm benign appearing ovarian cyst.  No follow-up imaging is recommended. Reference: J Am Hubert Radiol 2013;10:675-681             Not indicated unless otherwise documented above    EMERGENCY DEPARTMENT COURSE:     The patient was given the following medications:  Orders Placed This Encounter   Medications    0.9 % sodium chloride bolus    ketorolac (TORADOL) injection 15 mg    iopamidol (ISOVUE-370) 76 % injection 75 mL    sodium chloride flush 0.9 % injection 10 mL    0.9 % sodium chloride bolus    ketorolac (TORADOL) injection 15 mg    oxyCODONE-acetaminophen (PERCOCET) 5-325 MG per tablet     Sig: Take 1-2 tablets by mouth every 6 hours as needed for Pain for up to 7 days. OARRS Reviewed: ICD-10-CM Diagnosis Code R52 : Pain     Dispense:  15 tablet     Refill:  0        Vitals:   -------------------------  BP (!) 129/93   Pulse 83   Temp 98 °F (36.7 °C) (Oral)   Resp 18   Ht 5' 3\" (1.6 m)   Wt 85.7 kg (189 lb)   LMP 10/03/2021   SpO2 100%   BMI 33.48 kg/m²     11:15 AM labs unremarkable thus far. Awaiting CT results. Having some more discomfort will repeat Toradol. 12 PM CAT scan unremarkable for acute intra-abdominal pathology. We talked about the possibility of gastritis or duct but the possibility also of just a rib pain. Pain is very focal to the left sided ribs near the sternum reproducible. No difficulty breathing. Worse with movement. We will give a prescription for Percocet she has muscle relaxers at home. No driving if taking advised that it may make her drowsy. Will discharge to follow-up with her physician and return if worsening symptoms or other concerns. The patient understands that at this time there is no evidence for a more malignant underlying process, but also understands that early in the process of an illness or injury, an emergency department workup can be falsely reassuring.   Routine discharge counseling was given, and it is understood that worsening, changing or persistent symptoms should prompt an immediate call or follow up with their primary physician or return to the emergency department. The importance of appropriate follow up was also discussed. I have reviewed the disposition diagnosis. I have answered the questions and given discharge instructions. There was voiced understanding of these instructions and no further questions or complaints. CRITICAL CARE:    None    CONSULTS:    None    PROCEDURES:    None      OARRS Report if indicated    Periodic Controlled Substance Monitoring: No signs of potential drug abuse or diversion identified. (Brian Lagos DO)        FINAL IMPRESSION      1. Epigastric pain          DISPOSITION/PLAN   DISPOSITION          CONDITION ON DISPOSITION: STABLE       PATIENT REFERRED TO:  Pedro Mendoza, LEEANNA - CNP  [address] [facility] [address]  [address]  [phone]    Schedule an appointment as soon as possible for a visit in 3 days        DISCHARGE MEDICATIONS:  New Prescriptions    OXYCODONE-ACETAMINOPHEN (PERCOCET) 5-325 MG PER TABLET    Take 1-2 tablets by mouth every 6 hours as needed for Pain for up to 7 days.  OARRS Reviewed: ICD-10-CM Diagnosis Code R52 : Pain       (Please note that portions of this note were completed with a voice recognition program.  Efforts were made to edit the dictations but occasionally words are mis-transcribed.)    Andres Petersen DO   Attending Emergency Physician     Andres Petersen DO  10/12/21 1208

## 2021-10-13 LAB
ANION GAP SERPL CALCULATED.3IONS-SCNC: 14 MMOL/L (ref 9–17)
BUN BLDV-MCNC: 14 MG/DL (ref 6–20)
BUN/CREAT BLD: ABNORMAL (ref 9–20)
CALCIUM SERPL-MCNC: 9 MG/DL (ref 8.6–10.4)
CHLORIDE BLD-SCNC: 99 MMOL/L (ref 98–107)
CO2: 25 MMOL/L (ref 20–31)
CREAT SERPL-MCNC: 0.58 MG/DL (ref 0.5–0.9)
GFR AFRICAN AMERICAN: >60 ML/MIN
GFR NON-AFRICAN AMERICAN: >60 ML/MIN
GFR SERPL CREATININE-BSD FRML MDRD: ABNORMAL ML/MIN/{1.73_M2}
GFR SERPL CREATININE-BSD FRML MDRD: ABNORMAL ML/MIN/{1.73_M2}
GLUCOSE BLD-MCNC: 109 MG/DL (ref 70–99)
POTASSIUM SERPL-SCNC: 3.8 MMOL/L (ref 3.7–5.3)
SODIUM BLD-SCNC: 138 MMOL/L (ref 135–144)

## 2021-10-18 ENCOUNTER — HOSPITAL ENCOUNTER (OUTPATIENT)
Dept: ULTRASOUND IMAGING | Facility: CLINIC | Age: 41
Discharge: HOME | End: 2021-10-20
Payer: COMMERCIAL

## 2021-10-18 DIAGNOSIS — R11.0 NAUSEA: ICD-10-CM

## 2021-10-18 DIAGNOSIS — R10.84 GENERALIZED ABDOMINAL PAIN: ICD-10-CM

## 2021-10-18 PROCEDURE — 76705 ECHO EXAM OF ABDOMEN: CPT

## 2021-10-19 ENCOUNTER — TELEPHONE (OUTPATIENT)
Dept: PRIMARY CARE CLINIC | Age: 41
End: 2021-10-19

## 2021-10-19 NOTE — TELEPHONE ENCOUNTER
----- Message from Marianosofi Abe sent at 10/19/2021 10:48 AM EDT -----  Subject: Appointment Request    Reason for Call: Routine (Patient Request) No Script    QUESTIONS  Type of Appointment? New Patient/New to Provider  Reason for appointment request? Requested Provider unavailable - Marietta Dugan  Additional Information for Provider? would like a new patient appointment  ---------------------------------------------------------------------------  --------------  CALL BACK INFO  What is the best way for the office to contact you? OK to leave message on   voicemail  Preferred Call Back Phone Number? [phone]  ---------------------------------------------------------------------------  --------------  SCRIPT ANSWERS  Relationship to Patient? Self  Specialty Confirmation? Primary Care  (Is the patient requesting to see the provider for a procedure?)? No  (Is the patient requesting to see the provider urgently  today or   tomorrow. )? No  Have you been diagnosed with, awaiting test results for, or told that you   are suspected of having COVID-19 (Coronavirus)? (If patient has tested   negative or was tested as a requirement for work, school, or travel and   not based on symptoms, answer no)? No  Within the past two weeks have you developed any of the following symptoms   (answer no if symptoms have been present longer than 2 weeks or began   more than 2 weeks ago)? Fever or Chills, Cough, Shortness of breath or   difficulty breathing, Loss of taste or smell, Sore throat, Nasal   congestion, Sneezing or runny nose, Fatigue or generalized body aches   (answer no if pain is specific to a body part e.g. back pain), Diarrhea,   Headache? No  Have you had close contact with someone with COVID-19 in the last 14 days? No  (Service Expert  click yes below to proceed with [place]-the [employer] As Usual   Scheduling)?  Yes

## 2021-10-19 NOTE — TELEPHONE ENCOUNTER
Call center sent message to incorrect inbox. Pt notified, confirmed she was calling the correct PH# for provider office.

## 2021-11-08 ENCOUNTER — HOSPITAL ENCOUNTER (OUTPATIENT)
Dept: MRI IMAGING | Age: 41
Discharge: HOME | End: 2021-11-10
Payer: COMMERCIAL

## 2021-11-08 DIAGNOSIS — R10.9 ABDOMINAL PAIN, UNSPECIFIED ABDOMINAL LOCATION: ICD-10-CM

## 2021-11-08 PROCEDURE — 74181 MRI ABDOMEN W/O CONTRAST: CPT

## 2021-11-13 ENCOUNTER — HOSPITAL ENCOUNTER (EMERGENCY)
Age: 41
Discharge: HOME | End: 2021-11-13
Payer: COMMERCIAL

## 2021-11-13 VITALS
WEIGHT: 186 LBS | OXYGEN SATURATION: 98 % | RESPIRATION RATE: 16 BRPM | TEMPERATURE: 98.4 F | SYSTOLIC BLOOD PRESSURE: 145 MMHG | HEIGHT: 63 IN | BODY MASS INDEX: 32.96 KG/M2 | HEART RATE: 100 BPM | DIASTOLIC BLOOD PRESSURE: 96 MMHG

## 2021-11-13 DIAGNOSIS — M79.7 FIBROMYALGIA: Primary | ICD-10-CM

## 2021-11-13 PROCEDURE — 99284 EMERGENCY DEPT VISIT MOD MDM: CPT

## 2021-11-13 RX ORDER — GABAPENTIN 100 MG/1
100 CAPSULE ORAL 2 TIMES DAILY
Qty: 10 CAPSULE | Refills: 1 | Status: SHIPPED | OUTPATIENT
Start: 2021-11-13 | End: 2022-03-10

## 2021-11-13 ASSESSMENT — PAIN DESCRIPTION - ORIENTATION: ORIENTATION: LEFT

## 2021-11-13 ASSESSMENT — PAIN DESCRIPTION - FREQUENCY: FREQUENCY: CONTINUOUS

## 2021-11-13 ASSESSMENT — ENCOUNTER SYMPTOMS: ABDOMINAL PAIN: 1

## 2021-11-13 ASSESSMENT — PAIN DESCRIPTION - DESCRIPTORS: DESCRIPTORS: BURNING;SHARP

## 2021-11-13 ASSESSMENT — PAIN DESCRIPTION - LOCATION: LOCATION: ABDOMEN

## 2021-11-13 ASSESSMENT — PAIN SCALES - GENERAL: PAINLEVEL_OUTOF10: 8

## 2021-11-13 ASSESSMENT — PAIN DESCRIPTION - PAIN TYPE: TYPE: ACUTE PAIN

## 2021-11-13 NOTE — ED PROVIDER NOTES
[id number] [facility] ED  [address] [facility] [address]  Phone: [phone]  Fax: [phone]        Pt Name: Ford Kumar  MRN: [id number]  [place] 1980  Date of evaluation: 11/13/21      CHIEF COMPLAINT     Chief Complaint   Patient presents with    Abdominal Pain     L abdominal/rib/flank pain         HISTORY OF PRESENT ILLNESS  (Location/Symptom, Timing/Onset, Context/Setting, Quality, Duration, Modifying Factors, Severity.)    Ford Kumar is a 46 y.o. female who presents with abdominal pain. The patient dates she has had burning abdominal pain since April she has fibromyalgia she will have aches and pains throughout her body in the past when she had a burning pain she was given gabapentin with relief she is out of her gabapentin the patient denies any fever or chills no vomiting no diarrhea no blood in her urine or stool nothing she does makes her symptoms better or worse      REVIEW OF SYSTEMS    (2-9 systems for level 4, 10 or more for level 5)     Review of Systems   Gastrointestinal: Positive for abdominal pain. All other systems reviewed and are negative. PAST MEDICAL HISTORY    has a past medical history of Cancer (HCC)-skin, Fibromyalgia, and Thyroid nodule. SURGICAL HISTORY      has a past surgical history that includes Skin cancer excision and Thyroidectomy. CURRENTMEDICATIONS       Previous Medications    CALCIUM CARBONATE-VITAMIN D (OYSTER SHELL CALCIUM/D) 500-200 MG-UNIT TABS    Take 1 tablet by mouth 3 times daily    GREEN  MG CAPS    Take 250 mg by mouth daily as needed    LORAZEPAM (ATIVAN) 0.5 MG TABLET    LORazepam 0.5 MG Oral Tablet  TAKE 1 TABLET DAILY AS NEEDED.    Quantity: 30 Refills: 0     Royetta [place] D.O.; Active    MAGNESIUM GLYCINATE PO    Take 400 mg by mouth daily as needed    MELOXICAM (MOBIC) 7.5 MG TABLET    TAKE 1 TABLET BY MOUTH TWICE DAILY AS NEEDED FOR PAIN    PANTOPRAZOLE (PROTONIX) 40 MG TABLET PANTOPRAZOLE SODIUM (PROTONIX) 40 MG PACK PACKET    1 tablet    THYROID (ARMOUR THYROID) 90 MG TABLET    Take 90 mg by mouth daily    TIZANIDINE (ZANAFLEX) 2 MG TABLET    TAKE 1 TABLET BY MOUTH THREE TIMES DAILY AS NEEDED    VITAMIN E 180 MG (400 UNIT) CAPS CAPSULE    Take 180 mg by mouth daily       ALLERGIES     is allergic to penicillins and sudafed [pseudoephedrine hcl]. FAMILY HISTORY     has no family status information on file. family history is not on file. SOCIAL HISTORY      reports that she has never smoked. She has never used smokeless tobacco. She reports previous alcohol use. She reports current drug use. Drug: Marijuana Simmie Lang). PHYSICAL EXAM    (up to 7 for level 4, 8 or more for level 5)   INITIAL VITALS:  height is 5' 3\" (1.6 m) and weight is 84.4 kg (186 lb). Her blood pressure is 145/96 (abnormal) and her pulse is 100. Her oxygen saturation is 98%. Physical Exam  Vitals and nursing note reviewed. Constitutional:       Appearance: Normal appearance. HENT:      Head: Normocephalic and atraumatic. Eyes:      Conjunctiva/sclera: Conjunctivae normal.   Cardiovascular:      Rate and Rhythm: Normal rate and regular rhythm. Pulses: Normal pulses. Heart sounds: Normal heart sounds. Pulmonary:      Effort: Pulmonary effort is normal. No respiratory distress. Breath sounds: Normal breath sounds. No stridor. No wheezing, rhonchi or rales. Abdominal:      General: Bowel sounds are normal. There is no distension. Palpations: Abdomen is soft. There is no mass. Tenderness: There is no abdominal tenderness. There is no right CVA tenderness, left CVA tenderness, guarding or rebound. Musculoskeletal:         General: Normal range of motion. Cervical back: Normal range of motion and neck supple. Skin:     General: Skin is warm and dry. Neurological:      General: No focal deficit present. Mental Status: She is alert.          DIFFERENTIAL DIAGNOSIS/ MDM:     The patient states she has had this previously worked up with labs CT and an MRI I will go ahead and review those as well as check an OARRS report    DIAGNOSTIC RESULTS         RADIOLOGY:    EXAMINATION:   CT OF THE ABDOMEN AND PELVIS WITH CONTRAST 10/12/2021 10:15 am       TECHNIQUE:   CT of the abdomen and pelvis was performed with the administration of   intravenous contrast. Multiplanar reformatted images are provided for review. Dose modulation, iterative reconstruction, and/or weight based adjustment of   the mA/kV was utilized to reduce the radiation dose to as low as reasonably   achievable.       COMPARISON:   None.       HISTORY:   ORDERING SYSTEM PROVIDED HISTORY: Left upper abdominal pain   Reason for Exam: Left upper abd pain   Acuity: Acute   Type of Exam: Initial       FINDINGS:   Lower Chest: Normal heart size.  Lung bases clear.  Small hiatal hernia.       Organs: The liver, spleen, pancreas, adrenal glands kidneys and gallbladder   appear unremarkable.       GI/Bowel: No evidence of bowel obstruction or inflammation.  Normal appendix.       Pelvis: Bladder and uterus appear unremarkable.  2.3 cm left ovarian cyst for   which no follow-up imaging is recommended per guidelines below.       Peritoneum/Retroperitoneum: Normal caliber abdominal aorta.  No suspicious   lymphadenopathy. No ascites or free air.       Bones/Soft Tissues: No significant osseous abnormality.           Impression   Negative CT examination of the abdomen and pelvis with no evidence of acute   process.       RECOMMENDATIONS:   2.3 cm benign appearing ovarian cyst.  No follow-up imaging is recommended.    Reference: J Am Hubert Radiol 2013;10:[id number]         Interpretation per the Radiologist below, if available at the time of this note:    EXAMINATION:   MRI OF THE ABDOMEN WITHOUT CONTRAST, 11/8/2021 7:19 am       TECHNIQUE:   Multiplanar multisequence MRI of the abdomen was performed without the   administration of intravenous contrast.       COMPARISON:   CT abdomen and pelvis from 10/12/2021       HISTORY:   ORDERING SYSTEM PROVIDED HISTORY: Abdominal pain, unspecified abdominal   location   TECHNOLOGIST PROVIDED HISTORY:   Is the patient pregnant?->No   Reason for Exam: sports hernia to assess core muscle wall tear, left rectus   abdominus   Acuity: Unknown   Type of Exam: Initial   Additional signs and symptoms: patient states 2 months abdominal pain, left   side ribs to belly button       80-year-old female with 2 months of abdominal pain; left-sided rib pain   radiating down to belly button       FINDINGS:   Exam somewhat limited due to patient motion.       Trace free fluid in the pelvis.  Bilateral ovarian follicles and cysts with   the largest on the left measuring up to 1.7 cm.       Bilateral SI joints appear patent.  No significant fluid in the SI joints.    No marginal erosions at the SI joints.  No SI joint fusion.       Indeterminate T1/low STIR signal and low T2 signal structure in the   cul-de-sac measuring up to 3 cm.  This is best seen on image 49, series 7 and   image 27, series 9.  This most likely represents an exophytic uterine fibroid.       No evidence of tearing of the rectus abdominus musculature.  Visualized   rectus abdominus muscles are symmetric in appearance.  Small midline   fat-containing periumbilical hernia on image 25, series 6.  No evidence for   tearing at the bilateral pubic symphysis at the muscular insertions.       Mild diffuse disc bulge at L4-L5 with mild disc desiccation.  Remainder of   the lumbar spine grossly unremarkable.       Mild osteophyte spurring of the bilateral hip joint spaces.  Both femoral   heads are properly located in the bilateral acetabula without clear evidence   for acute fracture, dislocation or femoral head flattening.  No sizable hip   joint effusions.  Visualized proximal sciatic nerves demonstrate normal   course, contour, and caliber on limited coronal T1-weighted imaging.       Mild degenerative changes of the pubic symphysis.         Impression   1. Bilateral rectus abdominus musculature is grossly unremarkable in   appearance without evidence for tearing or disruption at the pubic   symphyseal/pubic body insertions. 2. Indeterminate low signal structure in the cul-de-sac measuring 3 cm, most   likely a fibroid.  Further evaluation with pelvic ultrasound recommended. 3. Small midline fat-containing periumbilical hernia. 4. Mild diffuse disc bulge and disc desiccation at L4-L5. 5. Trace free fluid in the pelvis.  Bilateral ovarian follicles and cysts   measuring up to 1.7 cm on the left. 6. Mild bilateral hip osteoarthrosis. 7. Exam somewhat limited due to patient motion. LABS:  No results found for this visit on 11/13/21. EMERGENCY DEPARTMENT COURSE:   Vitals:    Vitals:    11/13/21 1515   BP: (!) 145/96   Pulse: 100   SpO2: 98%   Weight: 84.4 kg (186 lb)   Height: 5' 3\" (1.6 m)     -------------------------  BP: (!) 145/96,  , Pulse: 100,        RE-EVALUATION:  OARRS report showed no signs of diversion so I will go ahead and write a short course of gabapentin until she can get in and see her family doctor  At this time the patient is without objective evidence of an acute process requiring hospitalization or inpatient management. They have remained hemodynamically stable throughout their entire ED visit and are stable for discharge with outpatient follow-up. The patient understands that at this time there is no evidence for a more malignant underlying process, but the patient also understands that early in the process of an illness or injury, an emergency department workup can be falsely reassuring. Routine discharge counseling was given, and the patient understands that worsening, changing or persistent symptoms should prompt an immediate call or follow up with their primary physician or return to the emergency department.  The importance of appropriate follow up was also discussed. I have reviewed the disposition diagnosis with the patient and or their family/guardian. I have answered their questions and given discharge instructions. They voiced understanding of these instructions and did not have any further questions or complaints. PROCEDURES:  None    FINAL IMPRESSION      1. Fibromyalgia          DISPOSITION/PLAN   DISPOSITION Decision To Discharge 11/13/2021 03:39:27 PM      CONDITION ON DISPOSITION:   Stable    PATIENT REFERRED TO:  Ju Moreno MD  [place] [address]  [phone]    Call in 2 days        DISCHARGE MEDICATIONS:  New Prescriptions    GABAPENTIN (NEURONTIN) 100 MG CAPSULE    Take 1 capsule by mouth 2 times daily for 5 days. Intended supply: 90 days       (Please note that portions of this note were completed with a voicerecognition program.  Efforts were made to edit the dictations but occasionally words are mis-transcribed.)    Kalpana Gold MD,, MD, F.A.C.E.P.   Attending Emergency Medicine Physician       Kalpana Gold MD  11/13/21 0166

## 2021-12-13 ENCOUNTER — OFFICE VISIT (OUTPATIENT)
Dept: ORTHOPEDIC SURGERY | Age: 41
End: 2021-12-13
Payer: COMMERCIAL

## 2021-12-13 VITALS — DIASTOLIC BLOOD PRESSURE: 90 MMHG | SYSTOLIC BLOOD PRESSURE: 142 MMHG | HEART RATE: 126 BPM

## 2021-12-13 DIAGNOSIS — R10.9 TRIGGER POINT OF ABDOMEN: ICD-10-CM

## 2021-12-13 DIAGNOSIS — R19.8 ABDOMINAL WEAKNESS: ICD-10-CM

## 2021-12-13 DIAGNOSIS — M24.552 HIP FLEXOR TIGHTNESS, LEFT: Primary | ICD-10-CM

## 2021-12-13 PROCEDURE — G8484 FLU IMMUNIZE NO ADMIN: HCPCS | Performed by: FAMILY MEDICINE

## 2021-12-13 PROCEDURE — 99204 OFFICE O/P NEW MOD 45 MIN: CPT | Performed by: FAMILY MEDICINE

## 2021-12-13 PROCEDURE — G8427 DOCREV CUR MEDS BY ELIG CLIN: HCPCS | Performed by: FAMILY MEDICINE

## 2021-12-13 PROCEDURE — G8417 CALC BMI ABV UP PARAM F/U: HCPCS | Performed by: FAMILY MEDICINE

## 2021-12-13 PROCEDURE — 1036F TOBACCO NON-USER: CPT | Performed by: FAMILY MEDICINE

## 2021-12-13 RX ORDER — METOPROLOL SUCCINATE 25 MG/1
25 TABLET, EXTENDED RELEASE ORAL DAILY
COMMUNITY
Start: 2021-11-13

## 2021-12-13 RX ORDER — AMITRIPTYLINE HYDROCHLORIDE 50 MG/1
TABLET, FILM COATED ORAL
COMMUNITY
Start: 2021-11-15 | End: 2022-02-11 | Stop reason: ALTCHOICE

## 2021-12-13 NOTE — PROGRESS NOTES
Sports Medicine Consultation     CHIEF COMPLAINT:  Other (abdominal pain. left side. 6-7m. noticed after removal of thyroid due to CA.  possible injury lifting. worse over last 2m.)      HPI:  Jose Manuel Nix is a 46y.o. year old female who is a new patient being seen for regarding second opinion for abd/rib pain left side. The pain has been present for 6-7 month(s). The patient recalls a no specific injury, but pain after thyroidectomy possibly worsened after lifting water bottles. Referred tavares point 2 cm left lateral to umbilicus. The patient has tried PT with multiple modalities, trigger point injection to oblique with improvement. The pain is described as sharp and quick, \"fleeting\" deep pain. There is  numbness or tingling from injection. It is not stiff upon arising from sitting. There is not change in bowel or bladder function    she has a past medical history of Cancer (HCC)-skin, Fibromyalgia, and Thyroid nodule. she has a past surgical history that includes Skin cancer excision and Thyroidectomy. family history is not on file.     Social History     Socioeconomic History    Marital status:      Spouse name: Not on file    Number of children: Not on file    Years of education: Not on file    Highest education level: Not on file   Occupational History    Not on file   Tobacco Use    Smoking status: Never Smoker    Smokeless tobacco: Never Used   Vaping Use    Vaping Use: Never used   Substance and Sexual Activity    Alcohol use: Not Currently    Drug use: Yes     Types: Marijuana Lucianne Bars)     Comment: Meseret THOMAS     Sexual activity: Not on file   Other Topics Concern    Not on file   Social History Narrative    Not on file     Social Determinants of Health     Financial Resource Strain: Unknown    Difficulty of Paying Living Expenses: Patient refused   Food Insecurity: Unknown    Worried About [address] in the Last Year: Patient refused    Ran Out of Food in the Last Year: Patient refused   Transportation Needs:     Lack of Transportation (Medical): Not on file    Lack of Transportation (Non-Medical): Not on file   Physical Activity:     Days of Exercise per Week: Not on file    Minutes of Exercise per Session: Not on file   Stress:     Feeling of Stress : Not on file   Social Connections:     Frequency of Communication with Friends and Family: Not on file    Frequency of Social Gatherings with Friends and Family: Not on file    Attends [employer] Services: Not on file    Active Member of [address] [place] [address] or Organizations: Not on file    Attends Club or Organization Meetings: Not on file    Marital Status: Not on file   Intimate Partner Violence:     Fear of Current or Ex-Partner: Not on file    Emotionally Abused: Not on file    Physically Abused: Not on file    Sexually Abused: Not on file   Housing Stability:     Unable to Pay for Housing in the Last Year: Not on file    Number of [place] in the Last Year: Not on file    Unstable Housing in the Last Year: Not on file       Current Outpatient Medications   Medication Sig Dispense Refill    metoprolol succinate (TOPROL XL) 25 MG extended release tablet       amitriptyline (ELAVIL) 50 MG tablet       gabapentin (NEURONTIN) 100 MG capsule Take 1 capsule by mouth 2 times daily for 5 days.  Intended supply: 90 days 10 capsule 1    tiZANidine (ZANAFLEX) 2 MG tablet TAKE 1 TABLET BY MOUTH THREE TIMES DAILY AS NEEDED      MAGNESIUM GLYCINATE PO Take 400 mg by mouth daily as needed      Green Tea 250 MG CAPS Take 250 mg by mouth daily as needed      vitamin E 180 MG (400 UNIT) CAPS capsule Take 180 mg by mouth daily      pantoprazole (PROTONIX) 40 MG tablet       Calcium Carbonate-Vitamin D (OYSTER SHELL CALCIUM/D) 500-200 MG-UNIT TABS Take 1 tablet by mouth 3 times daily (Patient not taking: Reported on 10/5/2021)      meloxicam (MOBIC) 7.5 MG tablet TAKE 1 TABLET BY MOUTH TWICE DAILY AS NEEDED FOR PAIN      thyroid (ARMOUR THYROID) 90 MG tablet Take 90 mg by mouth daily      pantoprazole sodium (PROTONIX) 40 MG PACK packet 1 tablet      LORazepam (ATIVAN) 0.5 MG tablet LORazepam 0.5 MG Oral Tablet  TAKE 1 TABLET DAILY AS NEEDED. Quantity: 30 Refills: 0     Starlet Plush D.O.; Active       No current facility-administered medications for this visit. Allergies:  sheis allergic to penicillins and sudafed [pseudoephedrine hcl]. ROS:  CV:  Denies chest pain; palpitations; shortness of breath; swelling of feet, ankles; and loss of consciousness. CON: Denies fever and dizziness. ENT:  Denies hearing loss / ringing, ear infections hoarseness, and swallowing problems. RESP:  Denies chronic cough, spitting up blood, and asthma/wheezing. GI: Denies abdominal pain, change in bowel habits, nausea or vomiting, and blood in stools. :  Denies frequent urination, burning or painful urination, blood in the urine, and bladder incontinence. NEURO:  Denies headache, memory loss, sleep disturbance, and tremor or movement disorder. PHYSICAL EXAM:   BP (!) 142/90   Pulse 126   GENERAL: Christine Torres is a 46 y.o. female who is alert and oriented and sitting comfortably in our office. SKIN:  Intact without rashes, lesions or ulcerations. NEURO: Sensation to the extremity is intact. VASC:  Capillary refill is less than 3 seconds. Distal pulses are palpable. There is no lymphadenopathy. Inspection- No deformity, no atrophy  Palpation - Tenderness: Diffuse paraspinal tenderness palpation hypertonicity noted on physical exam there is a Chapmans point inferior and to the left of the umbilicus  ROM - limited flexion, limited extension  Strength- Reduced resulting in Easily fatigable core weakness  Sensation -WNL  Reflexes - WNL  SLR: negative  [place]: Negative  There is significant hip flexor tightness and tenderness palpation of the left hip flexor as compared to the right hip flexor. Retana point    Gait: normal    PSYCH:  Good fund of knowledge and displays understanding of exam.    RADIOLOGY: No results found. IMPRESSION:     1. Hip flexor tightness, left    2. Trigger point of abdomen    3. Abdominal weakness          PLAN:   We discussed some of the etiologies and natural histories of     ICD-10-CM    1. Hip flexor tightness, left  M24.552 Ambulatory referral to Physical Therapy   2. Trigger point of abdomen  R10.9 Ambulatory referral to Physical Therapy   3. Abdominal weakness  R19.8 Ambulatory referral to Physical Therapy   . We discussed the various treatment alternatives including anti-inflammatory medications, physical therapy, injections, further imaging studies and as a last resort surgery. At this point after very long discussion patient has what I believe to be a Chapmans point in the anterior abdominal area. I do not believe at any point this is a sports hernia or a sports hernia like issue though there is clear hip flexor and core and low back weakness that I think can be improved upon by a course of formal physical therapy. We had a very long discussion regarding different ways to treat pain and advised more of a manual treatment and a medical treatment with pills or injections for which the patient voiced understanding and agreement with. We will give her a osteopathic manipulative medicine specialist information once we find and have her start a course of formal physical therapy. This visit took well over 45 minutes with the majority being face-to-face discussing the diagnosis and treatment plan    Return to clinic in No follow-ups on file. Joe Conde     Please be aware portions of this note were completed using voice recognition software and unforeseen errors may have occurred    Electronically signed by Krishan Montes De Oca DO, FAOASM on 12/13/21 at 9:44 AM EST

## 2021-12-14 ENCOUNTER — HOSPITAL ENCOUNTER (OUTPATIENT)
Dept: PHYSICAL THERAPY | Facility: CLINIC | Age: 41
Setting detail: THERAPIES SERIES
Discharge: HOME | End: 2021-12-14
Payer: COMMERCIAL

## 2021-12-14 PROCEDURE — 97162 PT EVAL MOD COMPLEX 30 MIN: CPT

## 2021-12-14 PROCEDURE — 97140 MANUAL THERAPY 1/> REGIONS: CPT

## 2021-12-14 PROCEDURE — 97110 THERAPEUTIC EXERCISES: CPT

## 2021-12-14 NOTE — CONSULTS
[] [facility]) - [facility] &  Therapy  [address]  [phone]  F: [phone] [] [address]  [place] [address]   [address]  P: [phone]  F: [phone] [x] [address] &  Therapy  [address]  P: [phone]  F: [phone] [] [address]  P: [phone]  F: [phone] [] [address]  [facility]   [address]   [place]: [phone]  F: [phone]      Physical Therapy Spine Evaluation    Date:  2021  Patient: Randi Melendez  : 1980  MRN: [id number]  Physician: Marietta Houston DO  Insurance: [employer] [place] -  visit limit  Medical Diagnosis: Hip flexor tightness, trigger point of abdomen, abdominal weakness  Rehab Codes: M24.552; R10.9; R19.8  Onset Date: 10/21/21  Next 's appt.: prn    Subjective:   CC: Pt is a 46 y.o. female with a chief complaint of abdominal pain which began 7 months ago after her thyroidectomy and worsened in October of this year. She thinks the pain worsened due to lack of activity and poor posture. She describes the pain as intermittent, burning, shooting sharp and stabbing at times. It worsens with sitting, driving and using her computer. Laying flat, ice and heat seam to help the pain somewhat, along with walking. She reports it affects her 80% of the time and limits her activity and quality of life. She reports she has a history of emotional trauma in her child read along with fear and association with food/eating which she is getting counseling for. She also regularly sees a DC. She has had an injection in her abdomen before, which was helpful. Her goal is to function with minimal pain and learn exercises to avoid and prevent her pain.  She has a history of skin cancer, thyroid cancer, HTN, panic attacks, and depression  HPI: 10/21/21    PMHx: [] Unremarkable [] Diabetes [x] HTN  [] Pacemaker   [] MI/Heart Problems [x] Cancer, skin and thyroid [] Arthritis [] Other:              [x] Refer to full medical chart  In EPIC       Comorbidities:   [x] Obesity [] Dialysis  [] N/A   [] Asthma/COPD [] Dementia [x] Other: cancer of thyroid, skin   [] Stroke [] Sleep apnea [] Other:   [] Vascular disease [] Rheumatic disease [] Other:     Tests: [x] X-Ray: [x] MRI:  [] Other:    Medications: [x] Refer to full medical record [] None [] Other:  Allergies:      [x] Refer to full medical record [] None [] Other:    Function:  Hand Dominance  [] Right  [] Left  Employer [employer]   Job Status []  Normal duty   [x] Light duty   [] Off due to condition    []  Retired   [] Not employed   [] Disability  [] Other:  []  Return to work: Work activities/duties professor     Pain:  [x] Yes  [] No Location: abdomen Pain Rating: (0-10 scale) 8/10  Pain altered Tx:  [] Yes  [] No  Action:    Symptoms:  [] Improving [x] Worsening [] Same  Better:  [] AM    [] PM    [] Sit    [] Rise/Sit    [x]Stand    [x] Walk    [x] Lying    [] Other:  Worse: [] AM    [] PM    [x] Sit    [] Rise/Sit    []Stand    [] Walk    [] Lying    [x] Bend                      [] Valsalva    [] Other:  Sleep: [] OK    [x] Disturbed    Objective:     STRENGTH  ROM    Left Right Cervical NT   L1-2 Hip Flex 4- 4- Flexion    Hip Abd 5 5 Extension    L3-4 Knee Ext 5 5 Rotation L R   L4 Ankle DF 5 5 Sidebend L R   L5 EHL 5 5 Retraction    S1 Plant.  Flex 5 5 Lumbar    Abdominals 2=  Flexion 50% P   Erector Spinae 3  Extension 25% P   Glut max 4- 3+ Rotation L 25%P R 25%P      Sidebend L 25%P R 25%P      UE/LE     Hip flexor    50% 25%               TESTS (+/-) LEFT RIGHT Not Tested   SLR [] sit [x] supine - - []   Hamstring (SLR) - - []   SKTC   []   DKTC   []   Slump/Dural - - []   SI JT + + []   ABIMBOLA - - []   Joint Mobility ? ? [] Satnam Tests ? Pain ? Pain No Change Not Tested   RFIS [x] [] [] []   ANITA [x] [] [] []   RFIL [x] [] [] []   REIL [] [] [x] []   Rep Prot [] [] [] []   Rep Retract [] [] [] []       OBSERVATION No Deficit Deficit Not Tested Comments   Posture       Forward Head [] [x] []    Rounded Shoulders [] [x] []    Kyphosis [] [] []    Lordosis [] [x] []    Lateral Shift [] [x] []    Scoliosis [] [x] []    Iliac Crest [] [x] [] R ant innominate   PSIS [] [x] [] R ant innominate   ASIS [] [x] [] R ant innominate   Genu Valgus [] [] []    Genu Varus [] [] []    Genu Recurvatum [] [] []    Pronation [] [] []    Supination [] [] []    Leg Length Discrp [] [] []    Slumped Sitting [] [x] []    Palpation [] [x] [] Tenderness over multiple T.P.s of the C-T-L-S spine, abdomen, psoas B   Sensation [] [x] [] Sensitive over L > R abdomen   Edema [] [] []    Neurological [] [] []        Functional Test: 32/80 Score: 60% functionally impaired     Comments: Unable to lift anything up off of the floor, difficulty with prolonged standing, sitting, going up and down stairs    Assessment:  Pt presents with signs and symptoms of said diagnosis. Patient would benefit from skilled physical therapy services in order to: reduce pain in her abdomen, reduce anxiety,  increase LROM, increase core strength to return to ADLs and a better quality of life. Problems:    [x] ? Pain: abdomen, hips  [x] ? ROM: L-spine, psoas flexibility  [x] ? Strength: core, hip extensors  [x] ? Function:  [] Other:      STG: (to be met in 8-10 treatments)  1. ? Pain: in abdomen by any  2. ? ROM: L-Spine, B hip flexors by 10-20%  3. ? Strength: Glut max/core by 1/2 m. Grade or more  4. ? Function: per patient report  5. Patient to be independent with home exercise program as demonstrated by performance with correct form without cues. LTG: (to be met in 18+ treatments)  1. Minimize pain with ADLs  2. Increase LROM, LE flexibility to WFLs  3.  Increase core strength to 4-/5 or greater  4. Independent with progressed HEP    Patient goals: Function with minimal pain, learn exercises to avoid/prevent pain    Rehab Potential:  [] Good  [x] Fair  [] Poor   Suggested Professional Referral:  [x] No  [] Yes:  Barriers to Goal Achievement:  [] No  [x] Yes:psychological  Domestic Concerns:  [x] No  [] Yes:    Pt. Education:  [x] Plans/Goals, Risks/Benefits discussed  [x] Home exercise program  Method of Education: [x] Verbal  [x] Demo  [x] Written  Comprehension of Education:  [x] Verbalizes understanding. [x] Demonstrates understanding. [x] Needs Review. [] Demonstrates/verbalizes understanding of HEP/Ed previously given. Treatment Plan:  [x] Therapeutic Exercise   94978  [] Iontophoresis: 4 mg/mL Dexamethasone Sodium Phosphate  mAmin  91549   [x] Therapeutic Activity  68339 [x] Vasopneumatic cold with compression  60040    [] Gait Training   74912 [] Ultrasound   84487   [x] Neuromuscular Re-education  83853 [] Electrical Stimulation Unattended  16722   [x] Manual Therapy  96798 [] Electrical Stimulation Attended  74802   [x] Instruction in HEP  [] Lumbar/Cervical Traction  74361   [x] Aquatic Therapy   09222 [x] Cold/hotpack    [] Massage   65977      [x] Dry Needling, 1 or 2 muscles  34094   [] Biofeedback, first 15 minutes   71299  [] Biofeedback, additional 15 minutes   08647 [x] Dry Needling, 3 or more muscles  26895     []  Medication allergies reviewed for use of    Dexamethasone Sodium Phosphate 4mg/ml     with iontophoresis treatments. Pt is not allergic.     Frequency:  2 x/week for 18+ visits    Todays Treatment:  Modalities: Manual therapy 35 min: CST/MFR to abdomen, pelvic diaphragm release, thoracic diaphragm release, SER, VI & D  Precautions:thyroid, skin cancer, history of trauma  Exercises: 10 min  Exercise Reps/ Time Weight/ Level Comments   Add/abd isometrics 3 10 HEP inst   ppts 10 5    Ppt with LE circles  next     Press ups  next     Ball roll next Hip flexor st supine 2 20 HEP inst   Psoas st kneeling on chair 3 20 HEP inst - cues for hip position   Psoas stretch wall 3 20 cues for hip position                     Other:    Specific Instructions for next treatment:      Evaluation Complexity:  History (Personal factors, comorbidities) [] 0 [x] 1-2 [] 3+   Exam (limitations, restrictions) [] 1-2 [x] 3 [] 4+   Clinical presentation (progression) [] Stable [x] Evolving  [] Unstable   Decision Making [] Low [x] Moderate [] High    [] Low Complexity [x] Moderate Complexity [] High Complexity       Treatment Charges: Mins Units   [x] Evaluation       []  Low       [x]  Moderate       []  High 30 1   []  Modalities     [x]  Ther Exercise 10 1   [x]  Manual Therapy 30 2   []  Ther Activities     []  Aquatics     []  Vasocompression     []  Other       TOTAL TREATMENT TIME: 70    Time in: 0700    Time out: 0810    Electronically signed by: Debo Brown PT        Physician Signature:________________________________Date:__________________  By signing above or cosigning this note, I have reviewed this plan of care and certify a need for medically necessary rehabilitation services.      *PLEASE SIGN ABOVE AND FAX BACK ALL PAGES*

## 2021-12-15 ENCOUNTER — TELEPHONE (OUTPATIENT)
Dept: ORTHOPEDIC SURGERY | Age: 41
End: 2021-12-15

## 2021-12-15 ENCOUNTER — NURSE TRIAGE (OUTPATIENT)
Dept: OTHER | Facility: CLINIC | Age: 41
End: 2021-12-15

## 2021-12-16 ENCOUNTER — HOSPITAL ENCOUNTER (OUTPATIENT)
Dept: PHYSICAL THERAPY | Facility: CLINIC | Age: 41
Setting detail: THERAPIES SERIES
End: 2021-12-16
Payer: COMMERCIAL

## 2021-12-16 NOTE — TELEPHONE ENCOUNTER
Reason for Disposition   Abdominal pain is a chronic symptom (recurrent or ongoing AND present > 4 weeks)    Answer Assessment - Initial Assessment Questions  1. LOCATION: \"Where does it hurt? \"       All across the abdomen    2. RADIATION: \"Does the pain shoot anywhere else? \" (e.g., chest, back)      Fibromyalgia has caused pain at certain points in her abdominal muscles and pain has gone up to chest/breasts at times    3. ONSET: \"When did the pain begin? \" (e.g., minutes, hours or days ago)        October, 2021    4. SUDDEN: \"Gradual or sudden onset? \"       Gradual    5. PATTERN \"Does the pain come and go, or is it constant? \"     - If constant: \"Is it getting better, staying the same, or worsening? \"       (Note: Constant means the pain never goes away completely; most serious pain is constant and it progresses)      - If intermittent: \"How long does it last?\" \"Do you have pain now? \"      (Note: Intermittent means the pain goes away completely between bouts)      Pt reports that the abdominal pain has been relieved by fibromyalgia         6. SEVERITY: \"How bad is the pain? \"  (e.g., Scale 1-10; mild, moderate, or severe)    - MILD (1-3): doesn't interfere with normal activities, abdomen soft and not tender to touch     - MODERATE (4-7): interferes with normal activities or awakens from sleep, tender to touch     - SEVERE (8-10): excruciating pain, doubled over, unable to do any normal activities       Moderately severe    7. RECURRENT SYMPTOM: \"Have you ever had this type of abdominal pain before? \" If so, ask: \"When was the last time? \" and \"What happened that time? \"       Nothing like this has happened before    8. CAUSE: \"What do you think is causing the abdominal pain? \"      Possibly UTI    9. RELIEVING/AGGRAVATING FACTORS: \"What makes it better or worse? \" (e.g., movement, antacids, bowel movement)      Injections at the trigger sites    10.  OTHER SYMPTOMS: \"Has there been any vomiting, diarrhea, constipation, or urine problems? \"       Pt feels severe anxiety about having the fibromyalgia. Abdominal, back and groin pain. 11. PREGNANCY: \"Is there any chance you are pregnant? \" \"When was your last menstrual period? \"        No, currently menstruating    Protocols used: ABDOMINAL PAIN [facility]    Brief description of triage: pt reports that the Provider who saw her at the [facility] today noted that she may have a UTI and that the lab results will show the Provider which ABX should be prescribed to effectively treat the UTI. Triage indicates for patient to contact the Provider to see what ABX should be prescribed and facilitate the pharmacy, etc to obtain the med(s)    Care advice provided, patient verbalizes understanding; denies any other questions or concerns; instructed to call back for any new or worsening symptoms. This triage is a result of a call to [address] [place] [address]. Please do not respond to the triage nurse through this encounter. Any subsequent communication should be directly with the patient.

## 2021-12-17 ENCOUNTER — APPOINTMENT (OUTPATIENT)
Dept: CT IMAGING | Age: 41
End: 2021-12-17
Payer: COMMERCIAL

## 2021-12-17 ENCOUNTER — HOSPITAL ENCOUNTER (EMERGENCY)
Age: 41
Discharge: HOME | End: 2021-12-17
Attending: EMERGENCY MEDICINE
Payer: COMMERCIAL

## 2021-12-17 VITALS
RESPIRATION RATE: 18 BRPM | HEART RATE: 106 BPM | SYSTOLIC BLOOD PRESSURE: 135 MMHG | OXYGEN SATURATION: 98 % | TEMPERATURE: 97.8 F | DIASTOLIC BLOOD PRESSURE: 90 MMHG | WEIGHT: 183 LBS | HEIGHT: 63 IN | BODY MASS INDEX: 32.43 KG/M2

## 2021-12-17 DIAGNOSIS — R10.9 FLANK PAIN: Primary | ICD-10-CM

## 2021-12-17 LAB
-: ABNORMAL
ABSOLUTE EOS #: 0 K/UL (ref 0–0.4)
ABSOLUTE IMMATURE GRANULOCYTE: ABNORMAL K/UL (ref 0–0.3)
ABSOLUTE LYMPH #: 2.2 K/UL (ref 1–4.8)
ABSOLUTE MONO #: 0.7 K/UL (ref 0.1–1.2)
AMORPHOUS: ABNORMAL
ANION GAP SERPL CALCULATED.3IONS-SCNC: 11 MMOL/L (ref 9–17)
BACTERIA: ABNORMAL
BASOPHILS # BLD: 1 % (ref 0–2)
BASOPHILS ABSOLUTE: 0.1 K/UL (ref 0–0.2)
BILIRUBIN URINE: NEGATIVE
BUN BLDV-MCNC: 15 MG/DL (ref 6–20)
BUN/CREAT BLD: NORMAL (ref 9–20)
CALCIUM SERPL-MCNC: 9.4 MG/DL (ref 8.6–10.4)
CASTS UA: ABNORMAL /LPF
CHLORIDE BLD-SCNC: 102 MMOL/L (ref 98–107)
CO2: 24 MMOL/L (ref 20–31)
COLOR: YELLOW
COMMENT UA: ABNORMAL
CREAT SERPL-MCNC: 0.66 MG/DL (ref 0.5–0.9)
CRYSTALS, UA: ABNORMAL /HPF
DIFFERENTIAL TYPE: ABNORMAL
EOSINOPHILS RELATIVE PERCENT: 0 % (ref 1–4)
EPITHELIAL CELLS UA: ABNORMAL /HPF (ref 0–5)
GFR AFRICAN AMERICAN: >60 ML/MIN
GFR NON-AFRICAN AMERICAN: >60 ML/MIN
GFR SERPL CREATININE-BSD FRML MDRD: NORMAL ML/MIN/{1.73_M2}
GFR SERPL CREATININE-BSD FRML MDRD: NORMAL ML/MIN/{1.73_M2}
GLUCOSE BLD-MCNC: 98 MG/DL (ref 70–99)
GLUCOSE URINE: NEGATIVE
HCT VFR BLD CALC: 40.7 % (ref 36–46)
HEMOGLOBIN: 13.5 G/DL (ref 12–16)
IMMATURE GRANULOCYTES: ABNORMAL %
KETONES, URINE: NEGATIVE
LEUKOCYTE ESTERASE, URINE: NEGATIVE
LYMPHOCYTES # BLD: 19 % (ref 24–44)
MCH RBC QN AUTO: 30.6 PG (ref 26–34)
MCHC RBC AUTO-ENTMCNC: 33.2 G/DL (ref 31–37)
MCV RBC AUTO: 92.3 FL (ref 80–100)
MONOCYTES # BLD: 6 % (ref 2–11)
MUCUS: ABNORMAL
NITRITE, URINE: NEGATIVE
NRBC AUTOMATED: ABNORMAL PER 100 WBC
OTHER OBSERVATIONS UA: ABNORMAL
PDW BLD-RTO: 13.3 % (ref 12.5–15.4)
PH UA: 7.5 (ref 5–8)
PLATELET # BLD: 401 K/UL (ref 140–450)
PLATELET ESTIMATE: ABNORMAL
PMV BLD AUTO: 7.7 FL (ref 6–12)
POTASSIUM SERPL-SCNC: 4.1 MMOL/L (ref 3.7–5.3)
PROTEIN UA: NEGATIVE
RBC # BLD: 4.41 M/UL (ref 4–5.2)
RBC # BLD: ABNORMAL 10*6/UL
RBC UA: ABNORMAL /HPF (ref 0–2)
RENAL EPITHELIAL, UA: ABNORMAL /HPF
SEG NEUTROPHILS: 74 % (ref 36–66)
SEGMENTED NEUTROPHILS ABSOLUTE COUNT: 8.5 K/UL (ref 1.8–7.7)
SODIUM BLD-SCNC: 137 MMOL/L (ref 135–144)
SPECIFIC GRAVITY UA: 1.02 (ref 1–1.03)
TRICHOMONAS: ABNORMAL
TURBIDITY: CLEAR
URINE HGB: ABNORMAL
UROBILINOGEN, URINE: NORMAL
WBC # BLD: 11.6 K/UL (ref 3.5–11)
WBC # BLD: ABNORMAL 10*3/UL
WBC UA: ABNORMAL /HPF (ref 0–5)
YEAST: ABNORMAL

## 2021-12-17 PROCEDURE — 81001 URINALYSIS AUTO W/SCOPE: CPT

## 2021-12-17 PROCEDURE — 80048 BASIC METABOLIC PNL TOTAL CA: CPT

## 2021-12-17 PROCEDURE — 85025 COMPLETE CBC W/AUTO DIFF WBC: CPT

## 2021-12-17 PROCEDURE — 74176 CT ABD & PELVIS W/O CONTRAST: CPT

## 2021-12-17 PROCEDURE — 99284 EMERGENCY DEPT VISIT MOD MDM: CPT

## 2021-12-17 PROCEDURE — 36415 COLL VENOUS BLD VENIPUNCTURE: CPT

## 2021-12-17 ASSESSMENT — PAIN DESCRIPTION - DESCRIPTORS: DESCRIPTORS: DULL

## 2021-12-17 ASSESSMENT — PAIN DESCRIPTION - PAIN TYPE: TYPE: ACUTE PAIN

## 2021-12-17 ASSESSMENT — PAIN DESCRIPTION - LOCATION: LOCATION: FLANK

## 2021-12-17 ASSESSMENT — PAIN SCALES - GENERAL: PAINLEVEL_OUTOF10: 4

## 2021-12-17 NOTE — ED PROVIDER NOTES
[address] [facility]  [address] [facility] [address] [facility] [address]  Phone: [phone]  Fax: [phone]        Pt Name: Eber Alcazar  MRN: [id number]  [place] 1980  Date of evaluation: 12/17/21      CHIEF COMPLAINT     Chief Complaint   Patient presents with    Flank Pain         HISTORY OF PRESENT ILLNESS  (Location/Symptom, Timing/Onset, Context/Setting, Quality, Duration, Modifying Factors, Severity.)    Eber Alcazar is a 46 y.o. female who presents with bilateral flank pain. Left worse than the right. Patient has a history of myofascial abdominal pain. She was seen here in the emergency department in October and had a CT scan of the abdomen and pelvis with IV contrast.  CT scan just showed a small ovarian cyst on the left. Patient had an ultrasound of the abdomen on 10/18/2021. It was read as a normal ultrasound. Patient had an MRI of the abdomen 11/8/2021 that showed no acute process. She did have a possible uterine fibroid 3 cm in size and a small midline fat-containing periumbilical hernia. There was mild disc bulge at L4-L5 and a trace of fluid in the pelvis consistent with bilateral ovarian follicles and a cyst measuring 1.7 cm on the left. Patient followed up with a pain specialist and had injections into the abdomen for her myofascial abdominal pain. The abdominal pain has now resolved and she is now noticing this flank pain. She states that she was seen in an urgent care a few days ago and was diagnosed to have a urinary tract infection. She was started on Bactrim. Urine culture was sent and she should get back the results of the urine culture today. Patient is currently on her menses so she does have some blood in the urine. She has had some intermittent nausea. Denies any fever or chills. Denies any dysuria or hematuria. She denies any diarrhea or constipation. Patient denies any cough congestion or sore throat.   She denies any shortness of breath or chest pain. REVIEW OF SYSTEMS    (2-9 systems for level 4, 10 or more for level 5)     Review of Systems this is reviewed and are negative except was present in the HPI    PAST MEDICAL HISTORY    has a past medical history of Cancer (HCC)-skin, Fibromyalgia, Myofascial pain, and Thyroid nodule. SURGICAL HISTORY      has a past surgical history that includes Skin cancer excision and Thyroidectomy. [address]       Current Discharge Medication List      CONTINUE these medications which have NOT CHANGED    Details   metoprolol succinate (TOPROL XL) 25 MG extended release tablet       amitriptyline (ELAVIL) 50 MG tablet       gabapentin (NEURONTIN) 100 MG capsule Take 1 capsule by mouth 2 times daily for 5 days. Intended supply: 90 days  Qty: 10 capsule, Refills: 1      tiZANidine (ZANAFLEX) 2 MG tablet TAKE 1 TABLET BY MOUTH THREE TIMES DAILY AS NEEDED      MAGNESIUM GLYCINATE PO Take 400 mg by mouth daily as needed      Green Tea 250 MG CAPS Take 250 mg by mouth daily as needed      vitamin E 180 MG (400 UNIT) CAPS capsule Take 180 mg by mouth daily      pantoprazole (PROTONIX) 40 MG tablet       Calcium Carbonate-Vitamin D (OYSTER SHELL CALCIUM/D) 500-200 MG-UNIT TABS Take 1 tablet by mouth 3 times daily      meloxicam (MOBIC) 7.5 MG tablet TAKE 1 TABLET BY MOUTH TWICE DAILY AS NEEDED FOR PAIN      thyroid (ARMOUR THYROID) 90 MG tablet Take 90 mg by mouth daily      pantoprazole sodium (PROTONIX) 40 MG PACK packet 1 tablet      LORazepam (ATIVAN) 0.5 MG tablet LORazepam 0.5 MG Oral Tablet  TAKE 1 TABLET DAILY AS NEEDED. Quantity: 30 Refills: 0     Chelsea Reining D.O.; Active             ALLERGIES     is allergic to penicillins and sudafed [pseudoephedrine hcl]. FAMILY HISTORY     has no family status information on file. family history is not on file. SOCIAL HISTORY      reports that she has never smoked. She has never used smokeless tobacco. She reports previous alcohol use.  She reports previous drug use. Drug: Marijuana [facility] [employer]). PHYSICAL EXAM    (up to 7 for level 4, 8 or more for level 5)   INITIAL VITALS:  height is 5' 3\" (1.6 m) and weight is 83 kg (183 lb). Her blood pressure is 135/90 (abnormal) and her pulse is 106. Her respiration is 18 and oxygen saturation is 98%. Physical Exam  Vitals reviewed. Constitutional:       General: She is not in acute distress. Eyes:      Extraocular Movements: Extraocular movements intact. Pupils: Pupils are equal, round, and reactive to light. Cardiovascular:      Rate and Rhythm: Normal rate and regular rhythm. Pulses: Normal pulses. Heart sounds: Normal heart sounds. Pulmonary:      Effort: Pulmonary effort is normal. No respiratory distress. Breath sounds: Normal breath sounds. Abdominal:      General: Bowel sounds are normal.      Palpations: Abdomen is soft. Tenderness: There is abdominal tenderness. There is right CVA tenderness and left CVA tenderness. There is no guarding or rebound. Negative signs include Romero's sign, Rovsing's sign and McBurney's sign. Comments: Patient has very mild bilateral CVA tenderness. Left more than the right. There is no rebound or guarding. Musculoskeletal:      Cervical back: Normal range of motion and neck supple. No rigidity or tenderness. Skin:     General: Skin is warm and dry. Capillary Refill: Capillary refill takes less than 2 seconds. Findings: No rash. Neurological:      General: No focal deficit present. GCS: GCS eye subscore is 4. GCS verbal subscore is 5. GCS motor subscore is 6. Sensory: Sensation is intact. Motor: Motor function is intact. Gait: Gait is intact.    Psychiatric:         Attention and Perception: Attention normal.         Behavior: Behavior normal.         Cognition and Memory: Cognition normal.         DIFFERENTIAL DIAGNOSIS/ MDM:     CT scan obtained of the abdomen and pelvis without contrast for possible kidney stone. Patient has a urinary tract infection and possibly could have a pyelonephritis. Labs were drawn to rule out acute kidney injury. I estimate there is LOW risk for ACUTE APPENDICITIS, BOWEL OBSTRUCTION, CHOLECYSTITIS, DIVERTICULITIS, INCARCERATED HERNIA, PANCREATITIS, or PERFORATED BOWEL or ULCER, thus I consider the discharge disposition reasonable. Re-evaluation of the abdomen is benign. No guarding, peritoneal signs or significant tenderness noted. Also, there is no evidence or peritonitis, sepsis, or toxicity. The patient and/or family and I have discussed the diagnosis and risks, and we agree with discharging home to follow-up with their primary doctor. The patient presents with abdominal pain without signs of peritonitis or other life-threatening or serious etiology. The patient appears stable for discharge and has been instructed to return immediately if the symptoms worsen in any way, or in 8-12 hr if not improved for re-evaluation. We also discussed returning to the Emergency Department immediately if new or worsening symptoms occur. We have discussed the symptoms which are most concerning (e.g., bloody stool, fever, changing or worsening pain, persistent vomiting, chest pain shortness of breath, numbness or weakness to the arms or legs, coolness or color change to the arms or legs) that necessitate immediate return. The patient understands that at this time there is no evidence for a more malignant underlying process, but the patient also understands that early in the process of an illness or injury, an emergency department workup can be falsely reassuring. Routine discharge counseling was given, and the patient understands that worsening, changing or persistent symptoms should prompt an immediate call or follow up with their primary physician or return to the emergency department. The importance of appropriate follow up was also discussed.   I have reviewed the disposition diagnosis with the patient and or their family/guardian. I have answered their questions and given discharge instructions. They voiced understanding of these instructions and did not have any further questions or complaints. DIAGNOSTIC RESULTS     EKG: All EKG's are interpreted by the Emergency Department Physician who either signs or Co-signs this chart in the absence of a cardiologist.        RADIOLOGY:        Interpretation per the Radiologist below, if available at the time of this note:      CT ABDOMEN PELVIS WO CONTRAST Additional Contrast? None    Result Date: 12/17/2021  EXAMINATION: CT OF THE ABDOMEN AND PELVIS WITHOUT CONTRAST 12/17/2021 9:15 am TECHNIQUE: CT of the abdomen and pelvis was performed without the administration of intravenous contrast. Multiplanar reformatted images are provided for review. Dose modulation, iterative reconstruction, and/or weight based adjustment of the mA/kV was utilized to reduce the radiation dose to as low as reasonably achievable. COMPARISON: MRI abdomen 11/08/2021. CT abdomen and pelvis 10/12/2021. HISTORY: ORDERING SYSTEM PROVIDED HISTORY: left flank pain TECHNOLOGIST PROVIDED HISTORY: left flank pain Decision Support Exception - unselect if not a suspected or confirmed emergency medical condition->Emergency Medical Condition (MA) Is the patient pregnant?->No Reason for Exam: left side abd pain micro hematuria FINDINGS: LOWER CHEST: No focal abnormality. KIDNEYS AND URINARY TRACT: No renal calculi are identified. There is no evidence for hydronephrosis. The ureters are of normal course and caliber. ORGANS: Lack of intravenous contrast limits evaluation of the solid organs and bowel. The liver, gallbladder, pancreas, spleen, and adrenals reveal no acute abnormality. GI/BOWEL: No bowel dilatation or wall thickening. PELVIS: No acute findings. PERITONEUM/RETROPERITONEUM: No lymphadenopathy is noted. No free air or free fluid. The aorta is normal in caliber. BONES/SOFT TISSUES: No acute osseous abnormality is identified. No acute abnormality or renal calculi identified.        LABS:  Results for orders placed or performed during the hospital encounter of 12/17/21   CBC Auto Differential   Result Value Ref Range    WBC 11.6 (H) 3.5 - 11.0 k/uL    RBC 4.41 4.0 - 5.2 m/uL    Hemoglobin 13.5 12.0 - 16.0 g/dL    Hematocrit 40.7 36 - 46 %    MCV 92.3 80 - 100 fL    MCH 30.6 26 - 34 pg    MCHC 33.2 31 - 37 g/dL    RDW 13.3 12.5 - 15.4 %    Platelets 010 789 - 738 k/uL    MPV 7.7 6.0 - 12.0 fL    NRBC Automated NOT REPORTED per 100 WBC    Differential Type NOT REPORTED     Seg Neutrophils 74 (H) 36 - 66 %    Lymphocytes 19 (L) 24 - 44 %    Monocytes 6 2 - 11 %    Eosinophils % 0 (L) 1 - 4 %    Basophils 1 0 - 2 %    Immature Granulocytes NOT REPORTED 0 %    Segs Absolute 8.50 (H) 1.8 - 7.7 k/uL    Absolute Lymph # 2.20 1.0 - 4.8 k/uL    Absolute Mono # 0.70 0.1 - 1.2 k/uL    Absolute Eos # 0.00 0.0 - 0.4 k/uL    Basophils Absolute 0.10 0.0 - 0.2 k/uL    Absolute Immature Granulocyte NOT REPORTED 0.00 - 0.30 k/uL    WBC Morphology NOT REPORTED     RBC Morphology NOT REPORTED     Platelet Estimate NOT REPORTED    Basic Metabolic Panel w/ Reflex to MG   Result Value Ref Range    Glucose 98 70 - 99 mg/dL    BUN 15 6 - 20 mg/dL    CREATININE 0.66 0.50 - 0.90 mg/dL    Bun/Cre Ratio NOT REPORTED 9 - 20    Calcium 9.4 8.6 - 10.4 mg/dL    Sodium 137 135 - 144 mmol/L    Potassium 4.1 3.7 - 5.3 mmol/L    Chloride 102 98 - 107 mmol/L    CO2 24 20 - 31 mmol/L    Anion Gap 11 9 - 17 mmol/L    GFR Non-African American >60 >60 mL/min    GFR African American >60 >60 mL/min    GFR Comment          GFR Staging NOT REPORTED    Urinalysis Reflex to Culture    Specimen: Urine, clean catch   Result Value Ref Range    Color, UA Yellow Yellow    Turbidity UA Clear Clear    Glucose, Ur NEGATIVE NEGATIVE    Bilirubin Urine NEGATIVE NEGATIVE    Ketones, Urine NEGATIVE NEGATIVE    Specific Gravity, UA mis-transcribed. )    Madan Murray DO, , MD, F.A.C.E.P.   Attending Emergency Medicine Physician        Anirudh Hester DO  12/17/21 [id number]

## 2021-12-20 ENCOUNTER — HOSPITAL ENCOUNTER (OUTPATIENT)
Dept: PHYSICAL THERAPY | Facility: CLINIC | Age: 41
Setting detail: THERAPIES SERIES
Discharge: HOME | End: 2021-12-20
Payer: COMMERCIAL

## 2021-12-20 PROCEDURE — 97110 THERAPEUTIC EXERCISES: CPT

## 2021-12-20 NOTE — FLOWSHEET NOTE
[] [facility] [address]  [address]  [phone]  F: [phone] [] [address]  [address]   [address]  P: [phone]  F: [phone] [x] [address] [facility]  [address]  P: [phone]  F: [phone] [] [address]  P: [phone]  F: [phone] [] [address]  [facility]   [address]   [place]: [phone]  F: [phone]      Physical Therapy Daily Treatment Note    Date:  2021  Patient Name:  Jose Manuel Nix    :  1980  MRN: [id number]   Physician: Brad Zavala DO                        Insurance: [facility] [place] - 20 visit limit  Medical Diagnosis: Hip flexor tightness, trigger point of abdomen, abdominal weakness             Rehab Codes: M24.552; R10.9; R19.8  Onset Date: 10/21/21             Next 's appt.: 2022 prn       Visit# / total visits: +     Cancels/No Shows:     Subjective:    Pain:  [x] Yes  [] No Location: Abdominal, LB, Hips Pain Rating: (0-10 scale) 6/10 (LB) 3/10 Abdominals    Pain altered Tx:  [] No  [] Yes  Action:  Comments: Pt arrived reporting symptoms are gradually improving. So far has received 2 abdominal injections into ~5-6 trigger points with pt stating pain can be \"sharp\" but intermittent and hard to assess location. Compliant to HEP and stretching.      Objective:  Modalities: Modalities: Manual therapy 6 min: no CST/MFR to abdomen, continued with pelvic diaphragm release, no thoracic diaphragm release, no SER, VI & D  Precautions:thyroid, skin cancer, history of trauma  Exercises:  Exercise Reps/ Time Weight/ Level Comments   Add/abd isometrics 3 10 HEP inst   Hip Flexor St Supine 2 20 HEP inst   Psoas St Kneeling on Chair 3 20 HEP inst- cues for hip position   Psoas St at [address] 3 20 Cues for hip position          PPT 10 5    PPT w/ March 10 e  HEP   PPT with Leg Circles To fatigue  HEP   PPT- Alternating UE's 10 e  HEP   Dead Bug  10 e  HEP   Prone Press Ups 3 10\" HEP   Ball Roll 5 5-10\"                Other:      Treatment Charges: Mins Units   []  Modalities     [x]  Ther Exercise 35 2   [x]  Manual Therapy 6 NC   []  Ther Activities     []  Aquatics     []  Vasocompression     []  Other     Total Treatment time 41 2       Assessment: [x] Progressing toward goals. Initiated treatment with pelvic diaphragm release with improved tissue mobility and heating present. Continued with psoas stretching to address bilateral tension with pt reporting the EOB and kneeling stretch provided optimal flexibility and no discomfort. Incorporated gentle core activation exercises to reduce pain and enhance lumbar stabilization; added marches, LE circles, alternating UE's and dead bug to optimize core work with good tolerance. Added ball roll and prone press ups to enhance lumbar extension with good relief noted. Pt reported LB and core pain had decreased post session with no soreness present however reporting some lumbar spasms. Pt able to stretch out the area and resolve symptoms. Provided pt with updated HO of core work to progress at home. Will continue to advance as tolerated; pt to be out of town until 1/3/22. [] No change. [] Other:  [x] Patient would continue to benefit from skilled physical therapy services in order to: reduce pain in her abdomen, reduce anxiety,  increase LROM, increase core strength to return to ADLs and a better quality of life. STG: (to be met in 8-10 treatments)  1. ? Pain: in abdomen by any  2. ? ROM: L-Spine, B hip flexors by 10-20%  3. ? Strength: Glut max/core by 1/2 m. Grade or more  4. ? Function: per patient report  5.  Patient to be independent with home exercise program as demonstrated by performance with correct form without cues.     LTG: (to be met in 18+ treatments)  1. Minimize pain with ADLs  2. Increase LROM, LE flexibility to WFLs  3. Increase core strength to 4-/5 or greater  4. Independent with progressed HEP      Pt. Education:  [x] Yes  [] No  [x] Reviewed Prior HEP/Ed  Method of Education: [x] Verbal  [x] Demo  [x] Written  Comprehension of Education:  [x] Verbalizes understanding. [x] Demonstrates understanding. [x] Needs review. [] Demonstrates/verbalizes HEP/Ed previously given. Plan: [] Continue current frequency toward long and short term goals. [x] Specific Instructions for subsequent treatments: Continue treatment per POC      Time In: 12:58 pm           Time Out: 1:47 pm    Electronically signed by:   Carine Hernandez [place]

## 2021-12-29 ENCOUNTER — APPOINTMENT (OUTPATIENT)
Dept: GENERAL RADIOLOGY | Age: 41
End: 2021-12-29
Payer: COMMERCIAL

## 2021-12-29 ENCOUNTER — HOSPITAL ENCOUNTER (EMERGENCY)
Age: 41
Discharge: HOME | End: 2021-12-29
Attending: EMERGENCY MEDICINE
Payer: COMMERCIAL

## 2021-12-29 VITALS
BODY MASS INDEX: 31.71 KG/M2 | WEIGHT: 179 LBS | HEART RATE: 100 BPM | HEIGHT: 63 IN | OXYGEN SATURATION: 98 % | RESPIRATION RATE: 16 BRPM | SYSTOLIC BLOOD PRESSURE: 145 MMHG | TEMPERATURE: 98.7 F | DIASTOLIC BLOOD PRESSURE: 97 MMHG

## 2021-12-29 DIAGNOSIS — R07.9 CHEST PAIN, UNSPECIFIED TYPE: Primary | ICD-10-CM

## 2021-12-29 LAB
EKG ATRIAL RATE: 101 BPM
EKG DIAGNOSIS: NORMAL
EKG P AXIS: 41 DEGREES
EKG P-R INTERVAL: 138 MS
EKG Q-T INTERVAL: 358 MS
EKG QRS DURATION: 80 MS
EKG QTC CALCULATION (BAZETT): 464 MS
EKG R AXIS: 11 DEGREES
EKG T AXIS: 25 DEGREES
EKG VENTRICULAR RATE: 101 BPM

## 2021-12-29 PROCEDURE — 93010 ELECTROCARDIOGRAM REPORT: CPT | Performed by: INTERNAL MEDICINE

## 2021-12-29 PROCEDURE — 93005 ELECTROCARDIOGRAM TRACING: CPT | Performed by: EMERGENCY MEDICINE

## 2021-12-29 PROCEDURE — 71046 X-RAY EXAM CHEST 2 VIEWS: CPT

## 2021-12-29 PROCEDURE — 99283 EMERGENCY DEPT VISIT LOW MDM: CPT

## 2021-12-30 NOTE — ED PROVIDER NOTES
CHIEF COMPLAINT  Chest Pain (pt reports was visiting brother in [place], was not feeling well since Sunday so decided to leave this am. sts +nausea +lightheadness, no appetite, cold sweats. driving home to [place] and since this am, +pain right lower chest \"under rib cage\" sts checked Bp pta, monitor read afib wants an ekg to make sure she isn't in afib. -cough -fever )      HISTORY OF PRESENT ILLNESS  Thomas Choi is a 46 y.o. female with a history of fibromyalgia, and thyroid cancer status post resection who presents to the ED complaining of chest pain. Patient states that she has had intermittent chest pain/palpitations and has been seen by evaluated by her PCP near [facility]. Patient states that he was traveling from [place] to home when she had sudden onset chest pain along the right chest wall. Patient reports that she has had some mild nausea and lightheadedness over the last several days. Patient states that she had a monitor in the car that indicated that she may have had atrial fibrillation. Patient request to have an EKG completed but did not want any additional lab work. .   No other complaints, modifying factors or associated symptoms. I have reviewed the following from the nursing documentation. Past Medical History:   Diagnosis Date    Asthma     Cancer (HCC)-skin     thyroid cancer    Fibromyalgia     Hyperlipidemia     Myofascial pain     of abdomen     Thyroid nodule     thyroid cancer     Past Surgical History:   Procedure Laterality Date    SKIN CANCER EXCISION      THYROIDECTOMY       History reviewed. No pertinent family history.   Social History     Socioeconomic History    Marital status:      Spouse name: Not on file    Number of children: Not on file    Years of education: Not on file    Highest education level: Not on file   Occupational History    Not on file   Tobacco Use    Smoking status: Never Smoker    Smokeless tobacco: Never Used Vaping Use    Vaping Use: Never used   Substance and Sexual Activity    Alcohol use: Not Currently    Drug use: Not Currently     Types: Marijuana Joya Shannan)     Comment: Medical [place] THOMAS     Sexual activity: Not on file   Other Topics Concern    Not on file   Social History Narrative    Not on file     Social Determinants of Health     Financial Resource Strain: Unknown    Difficulty of Paying Living Expenses: Patient refused   Food Insecurity: Unknown    Worried About Running Out of Food in the Last Year: Patient refused    [address] in the Last Year: Patient refused   Transportation Needs:     Lack of Transportation (Medical): Not on file    Lack of Transportation (Non-Medical): Not on file   Physical Activity:     Days of Exercise per Week: Not on file    Minutes of Exercise per Session: Not on file   Stress:     Feeling of Stress : Not on file   Social Connections:     Frequency of Communication with Friends and Family: Not on file    Frequency of Social Gatherings with Friends and Family: Not on file    Attends Holiness Services: Not on file    Active Member of [address] [place] [address] or Organizations: Not on file    Attends Club or Organization Meetings: Not on file    Marital Status: Not on file   Intimate Partner Violence:     Fear of Current or Ex-Partner: Not on file    Emotionally Abused: Not on file    Physically Abused: Not on file    Sexually Abused: Not on file   Housing Stability:     Unable to Pay for Housing in the Last Year: Not on file    Number of [place] in the Last Year: Not on file    Unstable Housing in the Last Year: Not on file     No current facility-administered medications for this encounter.      Current Outpatient Medications   Medication Sig Dispense Refill    metoprolol succinate (TOPROL XL) 25 MG extended release tablet 50 mg       amitriptyline (ELAVIL) 50 MG tablet       tiZANidine (ZANAFLEX) 2 MG tablet TAKE 1 TABLET BY MOUTH THREE TIMES DAILY AS NEEDED  MAGNESIUM GLYCINATE PO Take 400 mg by mouth daily as needed      vitamin E 180 MG (400 UNIT) CAPS capsule Take 180 mg by mouth daily      pantoprazole (PROTONIX) 40 MG tablet       meloxicam (MOBIC) 7.5 MG tablet TAKE 1 TABLET BY MOUTH TWICE DAILY AS NEEDED FOR PAIN      thyroid (ARMOUR THYROID) 90 MG tablet Take 90 mg by mouth daily      LORazepam (ATIVAN) 0.5 MG tablet LORazepam 0.5 MG Oral Tablet  TAKE 1 TABLET DAILY AS NEEDED. Quantity: 30 Refills: 0     Luc Leeks D.O.; Active      gabapentin (NEURONTIN) 100 MG capsule Take 1 capsule by mouth 2 times daily for 5 days. Intended supply: 90 days 10 capsule 1    Green Tea 250 MG CAPS Take 250 mg by mouth daily as needed      Calcium Carbonate-Vitamin D (OYSTER SHELL CALCIUM/D) 500-200 MG-UNIT TABS Take 1 tablet by mouth 3 times daily (Patient not taking: Reported on 10/5/2021)      pantoprazole sodium (PROTONIX) 40 MG PACK packet 1 tablet       Allergies   Allergen Reactions    Penicillins Other (See Comments)    Sudafed [Pseudoephedrine Hcl] Other (See Comments)     Jittery       REVIEW OF SYSTEMS  10 systems reviewed, pertinent positives per HPI otherwise noted to be negative. PHYSICAL EXAM  BP (!) 145/97   Pulse 100   Temp 98.7 °F (37.1 °C) (Oral)   Resp 16   Ht 5' 3\" (1.6 m)   Wt 179 lb (81.2 kg)   LMP 12/17/2021 (Exact Date)   SpO2 98%   BMI 31.71 kg/m²   GENERAL APPEARANCE: Awake and alert. Cooperative. No acute distress. HEAD: Normocephalic. Atraumatic. EYES: PERRL. EOM's grossly intact. ENT: Mucous membranes are moist.   NECK: Supple, trachea midline. HEART: RRR. Normal S1, S2. No murmurs, rubs or gallops. LUNGS: Respirations unlabored. CTAB. Good air exchange. No wheezes, rales, or rhonchi. Speaking comfortably in full sentences. ABDOMEN: Soft. Non-distended. Non-tender. No guarding or rebound. Normal Bowel sounds. EXTREMITIES: No peripheral edema. MAEE. No acute deformities. SKIN: Warm and dry.  No acute rashes. NEUROLOGICAL: Alert and oriented X 3. CN II-XII intact. No gross facial drooping. Strength 5/5, sensation intact. No pronator drift. Normal coordination. Gait normal.   PSYCHIATRIC: Normal mood and affect. LABS  I have reviewed all labs for this visit. Results for orders placed or performed during the hospital encounter of 12/29/21   EKG 12 Lead   Result Value Ref Range    Ventricular Rate 101 BPM    Atrial Rate 101 BPM    P-R Interval 138 ms    QRS Duration 80 ms    Q-T Interval 358 ms    QTc Calculation (Bazett) 464 ms    P Axis 41 degrees    R Axis 11 degrees    T Axis 25 degrees    Diagnosis       Sinus tachycardiaInferior infarct , age undeterminedAbnormal ECGConfirmed by Ahsan Quinonez ([id number]) on 12/29/2021 3:56:00 PM       EKG  Sinus tachycardia with a rate of 101. Normal axis. Normal intervals and durations. Inferior Q waves noted. No previous EKGs available for review. Cardiac Monitoring: No ectopy. Normal rate. RADIOLOGY  X-RAYS:  I have reviewed radiologic plain film image(s). ALL OTHER NON-PLAIN FILM IMAGES SUCH AS CT, ULTRASOUND AND MRI HAVE BEEN READ BY THE RADIOLOGIST. XR CHEST (2 VW)   Final Result   1. No acute cardiopulmonary abnormalities. Rechecks: Physical assessment performed. Patient is hypothyroid state. ED COURSE/MDM  Patient seen and evaluated. Old records reviewed. Labs and imaging reviewed and results discussed with patient. Patient was stable throughout her stay in the emergency department. EKG shows mild sinus tachycardia. I recommended that patient have lab work including cardiac enzymes as well as a chest x-ray. Patient considered a chest x-ray but declines any further testing at this time in spite of risks/benefits provided stating that she will follow-up for any worsening symptoms. . No acute pathology was noted and pt is safe for discharge home to follow up with PCP. Plan of care discussed with patient and family. Patient and family in agreement with plan. Patient was given scripts for the following medications. I counseled patient how to take these medications. Discharge Medication List as of 12/29/2021  3:30 PM          CLINICAL IMPRESSION  1. Chest pain, unspecified type        Blood pressure (!) 145/97, pulse 100, temperature 98.7 °F (37.1 °C), temperature source Oral, resp. rate 16, height 5' 3\" (1.6 m), weight 179 lb (81.2 kg), last menstrual period 12/17/2021, SpO2 98 %, not currently breastfeeding. Matt Alexis was discharged to home in stable condition.         Orion Londono,   12/29/21 2026

## 2021-12-31 ENCOUNTER — APPOINTMENT (OUTPATIENT)
Dept: ULTRASOUND IMAGING | Age: 41
End: 2021-12-31
Payer: COMMERCIAL

## 2021-12-31 ENCOUNTER — HOSPITAL ENCOUNTER (EMERGENCY)
Age: 41
Discharge: HOME | End: 2021-12-31
Attending: EMERGENCY MEDICINE
Payer: COMMERCIAL

## 2021-12-31 VITALS
HEIGHT: 63 IN | DIASTOLIC BLOOD PRESSURE: 91 MMHG | BODY MASS INDEX: 32.78 KG/M2 | RESPIRATION RATE: 16 BRPM | WEIGHT: 185 LBS | TEMPERATURE: 98.2 F | HEART RATE: 89 BPM | OXYGEN SATURATION: 100 % | SYSTOLIC BLOOD PRESSURE: 142 MMHG

## 2021-12-31 DIAGNOSIS — M79.652 LEFT THIGH PAIN: Primary | ICD-10-CM

## 2021-12-31 PROCEDURE — 99284 EMERGENCY DEPT VISIT MOD MDM: CPT

## 2021-12-31 PROCEDURE — 93971 EXTREMITY STUDY: CPT

## 2021-12-31 ASSESSMENT — PAIN SCALES - GENERAL: PAINLEVEL_OUTOF10: 3

## 2021-12-31 ASSESSMENT — PAIN DESCRIPTION - LOCATION: LOCATION: LEG

## 2021-12-31 ASSESSMENT — PAIN DESCRIPTION - PAIN TYPE: TYPE: ACUTE PAIN

## 2021-12-31 ASSESSMENT — PAIN DESCRIPTION - ORIENTATION: ORIENTATION: LEFT;UPPER

## 2021-12-31 ASSESSMENT — PAIN DESCRIPTION - DESCRIPTORS: DESCRIPTORS: CONSTANT;ACHING;BURNING

## 2021-12-31 NOTE — ED TRIAGE NOTES
Pt reports \"up and down\" B/P, reports PCP has been monitoring the B/P. Pt reports thyroidectomy in May. Pt reports long road trip for [place] and reports that anterior left upper leg, constant \" dull, and burning\" pain. Pt reports that no medications or treatments alleviate the Lt leg pain , pt reports intermittent leg \"cramping\" pain. Pt reports mother recently had DVT.

## 2021-12-31 NOTE — ED NOTES
Fabiana ,[address]  informed of diagnostic order.        Akin Rodarte RN  12/31/21 0233

## 2021-12-31 NOTE — ED PROVIDER NOTES
[id number] [facility]  [address] [facility] [address]  Phone: [phone]  Fax: [phone]      Attending Physician [address]       Chief Complaint   Patient presents with    Leg Pain     Left        DIAGNOSTIC RESULTS     LABS:  Labs Reviewed - No data to display    All other labs were within normal range or not returned as of this dictation. RADIOLOGY:  No orders to display         EMERGENCY DEPARTMENT COURSE:   Vitals:    Vitals:    12/31/21 1718   BP: (!) 143/109   Pulse: 114   Resp: 16   Temp: 98.2 °F (36.8 °C)   TempSrc: Oral   SpO2: 97%   Weight: 83.9 kg (185 lb)   Height: 5' 3\" (1.6 m)     -------------------------  BP: (!) 143/109, Temp: 98.2 °F (36.8 °C), Pulse: 114, Resp: 16             PERTINENT ATTENDING PHYSICIAN COMMENTS:    I performed a history and physical examination of the patient and discussed management with the mid level provider. I reviewed the mid level provider's note and agree with the documented findings and plan of care. Any areas of disagreement are noted on the chart. I was personally present for the key portions of any procedures. I have documented in the chart those procedures where I was not present during the key portions. I have reviewed the emergency nurses triage note. I agree with the chief complaint, past medical history, past surgical history, allergies, medications, social and family history as documented unless otherwise noted below. Documentation of the HPI, Physical Exam and Medical Decision Making performed by mid level providers is based on my personal performance of the HPI, PE and MDM. For Physician Assistant/ Nurse Practitioner cases/documentation I have personally evaluated this patient and have completed at least one if not all key elements of the E/M (history, physical exam, and MDM). Additional findings are as noted. 63-year-old female here with left leg pain. Recent trip to [place] in the car.

## 2022-01-01 NOTE — ED NOTES
Pt given written and verbal discharge and f/u instructions, pt verbalizes understanding. Pt is ambulatory with steady gait.       Terese Chance RN  12/31/21 1926

## 2022-01-02 NOTE — ED PROVIDER NOTES
[address] [facility]  [address] [address] [facility] [address] [facility] [address]  Phone: [phone]  Fax: [phone]        Pt Name: Azucena Hogue  MRN: [id number]  [place] 1980  Date of evaluation: 1/1/22    [address] [place] [address]       Chief Complaint   Patient presents with    Leg Pain     Left        HISTORY OF PRESENT ILLNESS (Location/Symptom, Timing/Onset, Context/Setting, Quality, Duration, Modifying Factors, Severity)      zAucena Hogue is a 46 y.o. female with pertinent PMH of fibromyalgia who presents to the ED via private auto with leg pain/swelling. Patient states that she recently drove to and from [place] a couple weeks ago and then noted that she worked out the following day and since then she has been experiencing left anterior thigh pain that is worse with ambulation. She has not tried any medication for symptoms. Denies chest pain or shortness of breath. Denies history of COPD, MI, Stroke, or CHF. Denies history of blood clots, clotting disorders, or malignancy. Denies family history of clotting disorders. She does note that her mom does have a history of clotting disorders. Denies recent trauma/fracture, immobilization/surgery, or extended travel. Denies pregnancy, post-partum <1 month, or use of hormones. Denies any fever, chills, numbness, weakness, paresthesias, pain to the surrounding joints, any other injuries, or any other concerns at this time. PAST MEDICAL / SURGICAL / SOCIAL / FAMILY HISTORY     PMH:  has a past medical history of Asthma, Cancer (HCC)-skin, Fibromyalgia, Hyperlipidemia, Myofascial pain, and Thyroid nodule. Surgical History:  has a past surgical history that includes Skin cancer excision and Thyroidectomy. Social History:  reports that she has never smoked. She has never used smokeless tobacco. She reports previous alcohol use. She reports previous drug use. Drug: Marijuana [place]k [place]). Family History: has no family status information on file. family history is not on file. Psychiatric History: None    Allergies: Penicillins and Sudafed [pseudoephedrine hcl]    Home Medications:   Prior to Admission medications    Medication Sig Start Date End Date Taking? Authorizing Provider   metoprolol succinate (TOPROL XL) 25 MG extended release tablet 50 mg  11/13/21   Historical Provider, MD   amitriptyline (ELAVIL) 50 MG tablet  11/15/21   Historical Provider, MD   gabapentin (NEURONTIN) 100 MG capsule Take 1 capsule by mouth 2 times daily for 5 days. Intended supply: 90 days 11/13/21 11/18/21  Mitch Sahu MD   tiZANidine (ZANAFLEX) 2 MG tablet TAKE 1 TABLET BY MOUTH THREE TIMES DAILY AS NEEDED 9/13/21   Historical Provider, MD   MAGNESIUM GLYCINATE PO Take 400 mg by mouth daily as needed    Historical Provider, MD Orozco Ilia Tea 250 MG CAPS Take 250 mg by mouth daily as needed    Historical Provider, MD   vitamin E 180 MG (400 UNIT) CAPS capsule Take 180 mg by mouth daily    Historical Provider, MD   pantoprazole (PROTONIX) 40 MG tablet  9/13/21   Historical Provider, MD   Calcium Carbonate-Vitamin D (OYSTER SHELL CALCIUM/D) 500-200 MG-UNIT TABS Take 1 tablet by mouth 3 times daily  Patient not taking: Reported on 10/5/2021 5/13/21   Historical Provider, MD   meloxicam (MOBIC) 7.5 MG tablet TAKE 1 TABLET BY MOUTH TWICE DAILY AS NEEDED FOR PAIN 7/21/21   Historical Provider, MD   thyroid (ARMOUR THYROID) 90 MG tablet Take 90 mg by mouth daily    Historical Provider, MD   pantoprazole sodium (PROTONIX) 40 MG PACK packet 1 tablet    Historical Provider, MD   LORazepam (ATIVAN) 0.5 MG tablet LORazepam 0.5 MG Oral Tablet  TAKE 1 TABLET DAILY AS NEEDED. Quantity: 30 Refills: 0     Margette Elm D.O.; Active    Historical Provider, MD       REVIEW OF SYSTEMS  (2-9 systems for level 4, 10 ormore for level 5)      Review of Systems    Constitutional: See HPI. HEENT: Denies sore throat or neck pain.    Respiratory: Denies cough or shortness of breath. Cardiovascular: Denies chest pain. Musculoskeletal: See HPI. Skin: Denies new rashes or wounds. Neurologic:  See HPI. All other systems negative except as marked. PHYSICAL EXAM  (up to 7 for level 4, 8 or more for level 5)      INITIAL VITALS:  height is 5' 3\" (1.6 m) and weight is 83.9 kg (185 lb). Her oral temperature is 98.2 °F (36.8 °C). Her blood pressure is 142/91 (abnormal) and her pulse is 89. Her respiration is 16 and oxygen saturation is 100%. Vital signs reviewed. Physical Exam    General:  Alert, cooperative, well-groomed, well-nourished, appears stated age, and is in no acute distress. Head:  Normocephalic, atraumatic, and without obvious abnormality. Eyes:  Sclerae/conjunctivae clear without injection, pallor, or icterus. Corneas clear without opacities. EOM's intact. ENT: Ears and nose are all without obvious masses lesion or deformity. No oropharynx examination performed due to aerosolization risk during COVID-19 pandemic. Lungs: No respiratory distress. Lungs CTA. No wheezes, rales, or rhonchi. Heart: Normal rate. Normal rhythm. No murmurs, rubs, or gallops. Neck: Supple and symmetrical. Trachea midline. No obvious adenopathy. Musculoskeletal: The left lower extremity is normal in appearance with tenderness to palpation over the superior aspect of the anterior thigh. No obvious deformity, ecchymosis, edema, erythema, warmth, abrasions, or lacerations to the area. No other signs of trauma. The surrounding joints are normal in appearance with full ROM and no bony or soft tissue tenderness. Sensation is intact to light touch. DP pulses 2+ and symmetrical. Cap refill <2 seconds. Extremities: Warm and dry. See MSK. Skin: Soft, good turgor, and well-hydrated. See MSK. Neurologic: GCS is 15 and no focal deficits are appreciated. Normal gait. Grossly normal motor and sensation. Speech clear. Psychiatric: Normal mood and affect. Normal behavior.  Coherent thought process. DIFFERENTIAL DIAGNOSIS / MDM     The patient presented to the ED with the complaints and exam as described above and concerns for DVT. Vital signs are stable. No chest pain or SOB. Pulses are 2+, motor at all joints are 5/5, and sensation is intact. Will obtain u/s though have very low suspicion for DVT. Offered medication for pain, but declined. Wells Criteria for DVT  Criteria Score if Yes Patient Score   Previously documented PE/DVT 1 0   Paralysis, paresis, or recent plaster immobilization of the lower extremity 1 0   Bedridden recently >3 days or major surgery within 12 weeks 1 0   Calf swelling >3 cm compared to the other leg 1 0   Entire leg swollen 1 0   Pitting edema, confined to symptomatic leg 1 0   Collateral (nonvaricose) superficial veins present 1 0   Localized tenderness along the deep venous system 1 0   Malignancy + treatment in past 6 months or palliative 1 0   Alternative diagnosis to DVT as likely or more likely (No) -2 (+1) -2   Low Risk 0 -2   Moderate Risk 1-2    High Risk >3         PLAN (LABS / IMAGING / EKG):  Orders Placed This Encounter   Procedures    US DUP LOWER EXTREMITY LEFT OLIVIER       MEDICATIONS ORDERED:  No orders of the defined types were placed in this encounter. Controlled Substances Monitoring:     DIAGNOSTIC RESULTS     EKG: All EKG's are interpreted by the Emergency Department Physician who either signs or Co-signs this chart in the absenceof a cardiologist.    RADIOLOGY: All images are read by the radiologist and their interpretations are reviewed. US DUP LOWER EXTREMITY LEFT OLIVIER    Result Date: 12/31/2021  EXAMINATION: DUPLEX VENOUS ULTRASOUND OF THE LEFT LOWER EXTREMITY 12/31/2021 1:06 pm TECHNIQUE: Duplex ultrasound using B-mode/gray scaled imaging and Doppler spectral analysis and color flow was obtained of the deep venous structures of the left lower extremity. COMPARISON: None.  HISTORY: ORDERING SYSTEM PROVIDED HISTORY: r/o DVT TECHNOLOGIST PROVIDED HISTORY: r/o DVT Reason for Exam: left leg pain started yesterday, recent long travel Relevant Medical/Surgical History: no h/o DVT, daily baby Aspirin, no BC pills FINDINGS: The visualized veins of the left lower extremity are patent and free of echogenic thrombus. The veins demonstrate good compressibility with normal color flow study and spectral analysis. No evidence of DVT in the left lower extremity. RECOMMENDATIONS: Unavailable       LABS:  No results found for this visit on 12/31/21. EMERGENCY DEPARTMENT COURSE           Vitals:    Vitals:    12/31/21 1718 12/31/21 1846   BP: (!) 143/109 (!) 142/91   Pulse: 114 89   Resp: 16 16   Temp: 98.2 °F (36.8 °C)    TempSrc: Oral    SpO2: 97% 100%   Weight: 83.9 kg (185 lb)    Height: 5' 3\" (1.6 m)      -------------------------  BP: (!) 142/91, Temp: 98.2 °F (36.8 °C), Pulse: 89, Resp: 16      RE-EVALUATION:  Patient's U/S was negative for DVT. Patient updated regarding these results. Do suspect this is likely musculoskeletal strain and patient I had a long discussion regarding the differences between a DVT associated pain and symptoms and musculoskeletal.  She will trial some over-the-counter pain medication and rest for home. The patient and/or family and I have discussed the diagnosis and risks, and we agree with discharging home to follow-up with their PCP and/or pertinent providers. The patient appears stable for discharge and has been instructed to return immediately for worsening symptoms or new concerning symptoms including fever, increased pain, weakness, numbness, color change, coldness to an extremity, etc. The patient understands that at this time there is no evidence for a more malignant underlying process, but the patient also understands that early in the process of an illness or injury, an emergency department workup can be falsely reassuring.  Routine discharge counseling was given, and the patient understands that worsening, changing or persistent symptoms should prompt an immediate call or follow up with their primary physician or return to the emergency department. I have reviewed the disposition diagnosis with the patient and or their family/guardian. I have answered their questions and given discharge instructions. They voiced understanding of these instructions and did not have any further questions or complaints. This patient was seen by the attending physician and they agreed with the assessment and plan. CONSULTS:  None    PROCEDURES:  None    FINAL IMPRESSION      1. Left thigh pain          DISPOSITION / PLAN     CONDITION ON DISPOSITION:   Good / Stable for discharge.      PATIENT REFERRED TO:  Phil Jordan MD  [address] [phone]    Call in 2 days        DISCHARGE MEDICATIONS:  Discharge Medication List as of 12/31/2021  7:04 PM          Zaira Quintanilla   Emergency Medicine Physician Assistant    (Please note that portions of this note were completed with a voice recognition program.  Efforts were made to edit the dictations but occasionally words aremis-transcribed.)        Blas Wei PA-C  01/01/22 [id number]

## 2022-01-03 ENCOUNTER — APPOINTMENT (OUTPATIENT)
Dept: PHYSICAL THERAPY | Facility: CLINIC | Age: 42
End: 2022-01-03
Payer: COMMERCIAL

## 2022-01-05 ENCOUNTER — HOSPITAL ENCOUNTER (OUTPATIENT)
Dept: PHYSICAL THERAPY | Facility: CLINIC | Age: 42
Setting detail: THERAPIES SERIES
Discharge: HOME | End: 2022-01-05
Payer: COMMERCIAL

## 2022-01-05 PROCEDURE — 97140 MANUAL THERAPY 1/> REGIONS: CPT

## 2022-01-05 PROCEDURE — 97110 THERAPEUTIC EXERCISES: CPT

## 2022-01-05 NOTE — FLOWSHEET NOTE
[] [facility]) [facility]  [facility]  [address]  [phone]  F: [phone] [] [address]  [facility]   [address]  P: [phone]  F: [phone] [x] [address] &  Therapy  [address]  P: [phone]  F: [phone] [] [address]  P: [phone]  F: [phone] [] [address]  [facility]   [address]   [place]: [phone]  F: [phone]      Physical Therapy Daily Treatment Note    Date:  2022  Patient Name:  Azucena Hogue    :  1980  MRN: [id number]   Physician: Bren Galvan DO                        Insurance: Medical [place] - 20 visit limit  Medical Diagnosis: Hip flexor tightness, trigger point of abdomen, abdominal weakness             Rehab Codes: M24.552; R10.9; R19.8  Onset Date: 10/21/21             Next 's appt.: 2022 prn       Visit# / total visits: 3/18+     Cancels/No Shows:     Subjective:    Pain:  [x] Yes  [] No Location: Abdominal, LB, Hips Pain Rating: (0-10 scale) 2/10 (LB) 1/10 Abdominals    Pain altered Tx:  [] No  [] Yes  Action:  Comments: Pt arrived with sig reduced pain level; stated she was feeling great until she returned to work, believes her Alvena Ilia" is causing postural issues that flare up sx. Noted she received MF work from the [employer] in [place] and is having craniosacral work done in  soon. Injections have helped a lot along with HEP; able to walk at the gym and perform stretches. Also mentioned high BP issues caused her to leave her vacation early.        Objective:  Modalities: Modalities: Manual therapy 15 min: no CST/MFR to abdomen, pelvic diaphragm release, thoracic diaphragm release, no SER, VI & D  Precautions:thyroid, skin cancer, history of trauma  Exercises:  Exercise Reps/ Time Weight/ Level Comments   Add/abd isometrics 3 10 HEP inst   Hip Flexor St Supine 2 20 HEP inst, manual OP   Psoas St Kneeling on Chair 3 20 HEP inst- cues for hip position   Psoas St at Wall 3 20 Cues for hip position          PPT 10 5    PPT w/ March 10 e  HEP   PPT with Leg Circles To fatigue  HEP   PPT- Alternating UE's 10 e  HEP   Dead Bug  10 e  HEP   Prone Press Ups 3 10\" HEP   Ball Roll 5 5-10\"    Lumbar Extension in Standing 10, 5\"  Red SB against wall   Supine Bicycle 20\" x 2     SB PPT 10, 5\"     SB PPT w/ Marches 10     SB PPT w/ Scap Retractions 10 Blue TB          Other:      Treatment Charges: Mins Units   []  Modalities     [x]  Ther Exercise 40 3   [x]  Manual Therapy 15 1   []  Ther Activities     []  Aquatics     []  Vasocompression     []  Other     Total Treatment time 55 4       Assessment: [x] Progressing toward goals. Initiated tx with pelvic diaphragm release with optimal tissue mobility and heating assessed. Continued with hip flexor stretching with slight OP performed to allow for increased flexibility. Focus directed to core strengthening with the addition of supine bicycles and PPT ex upon SB. Good stability and challenge noted with pt stating she will be able to complete at the gym. Pt did experience \"spasms\" along the thoracic/lumbar region sporadically through tx which also caused \"irritation\" along rectus abdominis. Ended session with manual via skin rolling to paraspinals along with Hypervolt in attempt to reduce restricted tissue. Pt reported feeling \"much better\" post treatment, 1/10 in the abdominals and lumbar area. Educated pt on postural awareness when working to prevent exacerbating sx and incr tension. Will continue to progress as tolerated. [] No change.      [] Other:  [x] Patient would continue to benefit from skilled physical therapy services in order to: reduce pain in her abdomen, reduce anxiety,  increase LROM, increase core strength to return to ADLs and a better quality of life. STG: (to be met in 8-10 treatments)  ? Pain: in abdomen by any  ? ROM: L-Spine, B hip flexors by 10-20%  ? Strength: Glut max/core by 1/2 m. Grade or more  ? Function: per patient report  Patient to be independent with home exercise program as demonstrated by performance with correct form without cues. LTG: (to be met in 18+ treatments)  Minimize pain with ADLs  Increase LROM, LE flexibility to [facility]  Increase core strength to 4-/5 or greater  Independent with progressed HEP      Pt. Education:  [x] Yes  [] No  [x] Reviewed Prior HEP/Ed  Method of Education: [x] Verbal  [x] Demo  [x] Written  Comprehension of Education:  [x] Verbalizes understanding. [x] Demonstrates understanding. [x] Needs review. [] Demonstrates/verbalizes HEP/Ed previously given. Plan: [] Continue current frequency toward long and short term goals. [x] Specific Instructions for subsequent treatments: Continue treatment per POC      Time In: 12:58 pm           Time Out: 2:00 pm    Electronically signed by:   Augusto Paget, [place]

## 2022-01-12 ENCOUNTER — HOSPITAL ENCOUNTER (OUTPATIENT)
Dept: PHYSICAL THERAPY | Facility: CLINIC | Age: 42
Setting detail: THERAPIES SERIES
Discharge: HOME | End: 2022-01-12
Payer: COMMERCIAL

## 2022-01-12 PROCEDURE — 97110 THERAPEUTIC EXERCISES: CPT

## 2022-01-12 NOTE — FLOWSHEET NOTE
[] [facility]) [facility]  [facility]  [address]  [phone]  F: [phone] [] [address]  [facility] [address]   [address]  P: [phone]  F: [phone] [x] [address] &  Therapy  [address]  P: [phone]  F: [phone] [] [address]  P: [phone]  F: [phone] [] [address]  [facility]   [address]   [place]: [phone]  F: [phone]      Physical Therapy Daily Treatment Note    Date:  2022  Patient Name:  Deandre Oden    :  1980  MRN: [id number]   Physician: Ananda Lew DO                        Insurance: Medical [place] -  visit limit  Medical Diagnosis: Hip flexor tightness, trigger point of abdomen, abdominal weakness             Rehab Codes: M24.552; R10.9; R19.8  Onset Date: 10/21/21             Next 's appt.: 2022 prn       Visit# / total visits: +     Cancels/No Shows:     Subjective:    Pain:  [x] Yes  [] No Location: Abdominal, LB, Hips Pain Rating: (0-10 scale) 3/10 (LB) 3/10 Abdominals (\"Intermittent pain\")    Pain altered Tx:  [] No  [] Yes  Action:  Comments: Pt arrived emotional this date and stating \"I need some encouragement\" with diagnosis. Pt compliant to HEP and addressing core strength and stabilization at home and the gym. Pt received craniosacral work recently in which released a lot of trauma and tension and will continue with treatments in .        Objective:  Modalities: Modalities: No CST/MFR to abdomen, no pelvic diaphragm release, no thoracic diaphragm release, no SER, VI & D  Precautions:thyroid, skin cancer, history of trauma  Exercises:  Exercise Reps/ Time Weight/ Level Comments   Add/abd isometrics 3 10 HEP inst   Hip Flexor St Supine 3 30 HEP inst, manual OP   Psoas St Kneeling on Chair 3 20 HEP inst- cues for hip position   Psoas St at Wall 3 20 Cues for hip position    Diaphragmatic Breathing 5 cycles     PPT 10 5    PPT w/ March 10 e  HEP   PPT with Leg Circles To fatigue  HEP   PPT- Alternating UE's 10 e  HEP   Dead Bug  10 e  HEP   Quadruped PPT 10, 5\"     Bird Dog 10x ea     Prone Hip Extension 10x ea     Prone Press Ups 3 10\" HEP   Ball Roll 5 5-10\"    Lumbar Extension and side bending in Standing 10, 5\"  Red SB against wall   Supine Bicycle 20\" x 2     SB PPT 10, 5\"     SB PPT w/ Marches 10     SB PPT w/ Scap Retractions 10 Blue TB Band for HEP   SB Pull Aparts      Palloff Press 20x Yellow Cord    Other:      Treatment Charges: Mins Units   []  Modalities     [x]  Ther Exercise 50 3   []  Manual Therapy     []  Ther Activities     []  Aquatics     []  Vasocompression     []  Other     Total Treatment time 50 3       Assessment: [x] Progressing toward goals. Initiated session with PPT ex and hip flexor stretching with pt able to tolerate longer hold times. Educated pt on diaphragmatic breathing to allow for improved core strength and oxygenation. Incorporated PPT in quadruped along with bird dog to address core and hip weakness; tactile cueing provided to reduce compensatory movements. Added prone hip extension to target glute weakness with pt challenged however denying any discomfort. Ended session with palloff press to further engage core musculature with no complaints noted. Provided positive encouragement in relation to symptoms and continuance with therapy to work towards achieving goals. Will continue to progress as tolerated. [] No change. [] Other:  [x] Patient would continue to benefit from skilled physical therapy services in order to: reduce pain in her abdomen, reduce anxiety,  increase LROM, increase core strength to return to ADLs and a better quality of life.        STG: (to be met in 8-10 treatments)  1. ? Pain: in abdomen by any  2. ? ROM: L-Spine, B hip flexors by 10-20%  3. ? Strength: Glut max/core by 1/2 m. Grade or more  4. ? Function: per patient report  5. Patient to be independent with home exercise program as demonstrated by performance with correct form without cues. LTG: (to be met in 18+ treatments)  1. Minimize pain with ADLs  2. Increase LROM, LE flexibility to WFLs  3. Increase core strength to 4-/5 or greater  4. Independent with progressed HEP      Pt. Education:  [x] Yes  [] No  [x] Reviewed Prior HEP/Ed  Method of Education: [x] Verbal  [x] Demo  [x] Written  Comprehension of Education:  [x] Verbalizes understanding. [x] Demonstrates understanding. [x] Needs review. [] Demonstrates/verbalizes HEP/Ed previously given. Plan: [] Continue current frequency toward long and short term goals. [x] Specific Instructions for subsequent treatments: Continue treatment per POC, Dry Needling with primary PT      Time In: 1:02 pm           Time Out: 2:10 pm    Electronically signed by:   Lorella Heimlich, [place]

## 2022-01-17 ENCOUNTER — HOSPITAL ENCOUNTER (OUTPATIENT)
Dept: PHYSICAL THERAPY | Facility: CLINIC | Age: 42
Setting detail: THERAPIES SERIES
End: 2022-01-17
Payer: COMMERCIAL

## 2022-01-19 ENCOUNTER — APPOINTMENT (OUTPATIENT)
Dept: PHYSICAL THERAPY | Facility: CLINIC | Age: 42
End: 2022-01-19
Payer: COMMERCIAL

## 2022-01-24 ENCOUNTER — HOSPITAL ENCOUNTER (OUTPATIENT)
Dept: PHYSICAL THERAPY | Facility: CLINIC | Age: 42
Setting detail: THERAPIES SERIES
Discharge: HOME | End: 2022-01-24
Payer: COMMERCIAL

## 2022-01-24 PROCEDURE — 97140 MANUAL THERAPY 1/> REGIONS: CPT

## 2022-01-24 NOTE — FLOWSHEET NOTE
[] [facility]) [facility] &  Therapy  [address]  [phone]  F: [phone] [] [address]  [address]   [address]  P: [phone]  F: [phone] [x] [address] &  Therapy  [address]  P: [phone]  F: [phone] [] [address]  P: [phone]  F: [phone] [] [address]  [facility]   [address]   [place]: [phone]  F: [phone]      Physical Therapy Daily Treatment Note    Date:  2022  Patient Name:  Russell Mooney    :  1980  MRN: [id number]   Physician: Sebastien Cherry DO                        Insurance: [employer] [place] - 20 visit limit  Medical Diagnosis: Hip flexor tightness, trigger point of abdomen, abdominal weakness             Rehab Codes: M24.552; R10.9; R19.8  Onset Date: 10/21/21             Next 's appt.: 2022 prn       Visit# / total visits: + (visit 3 in )    Cancels/No Shows:     Subjective:    Pain:  [x] Yes  [] No Location: Abdominal, LB, Hips Pain Rating: (0-10 scale) 3/10 (LB) 3/10 Abdominals (\"Intermittent pain\")    Pain altered Tx:  [] No  [x] Yes  Action:See below  Comments: Pt arrived reporting increased abdominal pain on the L side. She reports she has been compliant with her HEP and is addressing core strength and stabilization at home and the gym, as well as doing mental health therapy which has been helpful in releasing some trauma and tension. Objective:  Modalities: Manual therapy 65 min: Resumed CST/MFR to abdomen, including pelvic diaphragm release,  thoracic diaphragm release, SER, VI & D and HCT.   Precautions:thyroid, skin cancer, history of trauma  Exercises: rev only  Exercise Reps/ Time Weight/ Level Comments   Add/abd isometrics 3 10 HEP inst   Hip Flexor St Supine 3 30 HEP inst, manual OP   Psoas St Kneeling on Chair 3 20 HEP inst- cues for hip position   Psoas St at Wall 3 20 Cues for hip position    Diaphragmatic Breathing 5 cycles     PPT 10 5    PPT w/ March 10 e  HEP   PPT with Leg Circles To fatigue  HEP   PPT- Alternating UE's 10 e  HEP   Dead Bug  10 e  HEP   Quadruped PPT 10, 5\"     Bird Dog 10x ea     Prone Hip Extension 10x ea     Prone Press Ups 3 10\" HEP   Ball Roll 5 5-10\"    Lumbar Extension and side bending in Standing 10, 5\"  Red SB against wall   Supine Bicycle 20\" x 2     SB PPT 10, 5\"     SB PPT w/ Marches 10     SB PPT w/ Scap Retractions 10 Blue TB Band for HEP   SB Pull Aparts      Palloff Press 20x Yellow Cord    Other:      Treatment Charges: Mins Units   []  Modalities     [x]  Ther Exercise 65 4   []  Manual Therapy     []  Ther Activities     []  Aquatics     []  Vasocompression     []  Other     Total Treatment time 65 4       Assessment: [x] Progressing toward goals. Pt reported good tolerance to manual therapy with reduced tension in abdomen felt post-treatment. Pt able to release a lot of anxiety with SER/MFR this date. Pt reported she felt the treatment is helpful in her healing process and wants to continue with the manual therapy with occasional reminders for ex progression if needed now that she is consistent with doing her HEP. [] No change. [] Other:  [x] Patient would continue to benefit from skilled physical therapy services in order to: reduce pain in her abdomen, reduce anxiety,  increase LROM, increase core strength to return to ADLs and a better quality of life. STG: (to be met in 8-10 treatments)  1. ? Pain: in abdomen by any  2. ? ROM: L-Spine, B hip flexors by 10-20%  3. ? Strength: Glut max/core by 1/2 m. Grade or more  4. ? Function: per patient report  5. Patient to be independent with home exercise program as demonstrated by performance with correct form without cues.      LTG: (to

## 2022-01-31 ENCOUNTER — HOSPITAL ENCOUNTER (OUTPATIENT)
Dept: PHYSICAL THERAPY | Facility: CLINIC | Age: 42
Setting detail: THERAPIES SERIES
Discharge: HOME | End: 2022-01-31
Payer: COMMERCIAL

## 2022-01-31 NOTE — FLOWSHEET NOTE
[] [facility]) - [facility] &  Therapy  [address]    [phone]  F: [phone]   [] [address] [facility]   [address]  P: [phone]  F: [phone]  [] [address] [facility] &  Therapy  [address] [facility]  P: [phone]  F: [phone] [] [address]  P: [phone]  F: [phone]  [] [address]  [facility]   [address]   [place]: [phone]  F: [phone]   [] [address]  [place]: [phone]   F: [phone]     Physical Therapy Cancel/No Show note    Date: 2022  Patient: Jade Morris  : 1980  MRN: [id number]    Cancels/No Shows to date:     For today's appointment patient:    [x]  Cancelled    [] Rescheduled appointment    [] No-show     Reason given by patient:    []  Patient ill    []  Conflicting appointment    [] No transportation      [] Conflict with work    [] No reason given    [] Weather related    [] [id number]    [] Other:      Comments:  Car problems      [] Next appointment was confirmed    Electronically signed by: Marlene Kimble

## 2022-02-02 ENCOUNTER — HOSPITAL ENCOUNTER (EMERGENCY)
Age: 42
Discharge: HOME | End: 2022-02-02
Attending: EMERGENCY MEDICINE
Payer: COMMERCIAL

## 2022-02-02 VITALS
TEMPERATURE: 98.4 F | RESPIRATION RATE: 16 BRPM | WEIGHT: 183 LBS | SYSTOLIC BLOOD PRESSURE: 141 MMHG | DIASTOLIC BLOOD PRESSURE: 88 MMHG | HEART RATE: 78 BPM | OXYGEN SATURATION: 98 % | HEIGHT: 63 IN | BODY MASS INDEX: 32.43 KG/M2

## 2022-02-02 DIAGNOSIS — R10.84 GENERALIZED ABDOMINAL PAIN: Primary | ICD-10-CM

## 2022-02-02 LAB
ABSOLUTE EOS #: 0 K/UL (ref 0–0.4)
ABSOLUTE IMMATURE GRANULOCYTE: ABNORMAL K/UL (ref 0–0.3)
ABSOLUTE LYMPH #: 2.2 K/UL (ref 1–4.8)
ABSOLUTE MONO #: 0.8 K/UL (ref 0.1–1.2)
ALBUMIN SERPL-MCNC: 4 G/DL (ref 3.5–5.2)
ALBUMIN/GLOBULIN RATIO: 1.4 (ref 1–2.5)
ALP BLD-CCNC: 68 U/L (ref 35–104)
ALT SERPL-CCNC: 11 U/L (ref 5–33)
ANION GAP SERPL CALCULATED.3IONS-SCNC: 11 MMOL/L (ref 9–17)
AST SERPL-CCNC: 12 U/L
BASOPHILS # BLD: 1 % (ref 0–2)
BASOPHILS ABSOLUTE: 0.1 K/UL (ref 0–0.2)
BILIRUB SERPL-MCNC: 0.37 MG/DL (ref 0.3–1.2)
BUN BLDV-MCNC: 16 MG/DL (ref 6–20)
BUN/CREAT BLD: ABNORMAL (ref 9–20)
CALCIUM SERPL-MCNC: 9.2 MG/DL (ref 8.6–10.4)
CHLORIDE BLD-SCNC: 106 MMOL/L (ref 98–107)
CO2: 21 MMOL/L (ref 20–31)
CREAT SERPL-MCNC: 0.62 MG/DL (ref 0.5–0.9)
DIFFERENTIAL TYPE: ABNORMAL
EOSINOPHILS RELATIVE PERCENT: 0 % (ref 1–4)
GFR AFRICAN AMERICAN: >60 ML/MIN
GFR NON-AFRICAN AMERICAN: >60 ML/MIN
GFR SERPL CREATININE-BSD FRML MDRD: ABNORMAL ML/MIN/{1.73_M2}
GFR SERPL CREATININE-BSD FRML MDRD: ABNORMAL ML/MIN/{1.73_M2}
GLUCOSE BLD-MCNC: 132 MG/DL (ref 70–99)
HCT VFR BLD CALC: 38.7 % (ref 36–46)
HEMOGLOBIN: 12.9 G/DL (ref 12–16)
IMMATURE GRANULOCYTES: ABNORMAL %
LYMPHOCYTES # BLD: 24 % (ref 24–44)
MCH RBC QN AUTO: 30.6 PG (ref 26–34)
MCHC RBC AUTO-ENTMCNC: 33.4 G/DL (ref 31–37)
MCV RBC AUTO: 91.5 FL (ref 80–100)
MONOCYTES # BLD: 8 % (ref 2–11)
NRBC AUTOMATED: ABNORMAL PER 100 WBC
PDW BLD-RTO: 13.4 % (ref 12.5–15.4)
PLATELET # BLD: 369 K/UL (ref 140–450)
PLATELET ESTIMATE: ABNORMAL
PMV BLD AUTO: 7.6 FL (ref 6–12)
POTASSIUM SERPL-SCNC: 4.2 MMOL/L (ref 3.7–5.3)
RBC # BLD: 4.22 M/UL (ref 4–5.2)
RBC # BLD: ABNORMAL 10*6/UL
SEG NEUTROPHILS: 67 % (ref 36–66)
SEGMENTED NEUTROPHILS ABSOLUTE COUNT: 6 K/UL (ref 1.8–7.7)
SODIUM BLD-SCNC: 138 MMOL/L (ref 135–144)
TOTAL PROTEIN: 6.9 G/DL (ref 6.4–8.3)
WBC # BLD: 9 K/UL (ref 3.5–11)
WBC # BLD: ABNORMAL 10*3/UL

## 2022-02-02 PROCEDURE — 80053 COMPREHEN METABOLIC PANEL: CPT

## 2022-02-02 PROCEDURE — 85025 COMPLETE CBC W/AUTO DIFF WBC: CPT

## 2022-02-02 PROCEDURE — 99284 EMERGENCY DEPT VISIT MOD MDM: CPT

## 2022-02-02 PROCEDURE — 36415 COLL VENOUS BLD VENIPUNCTURE: CPT

## 2022-02-02 RX ORDER — ONDANSETRON 4 MG/1
4 TABLET, ORALLY DISINTEGRATING ORAL EVERY 8 HOURS PRN
Qty: 20 TABLET | Refills: 0 | Status: SHIPPED | OUTPATIENT
Start: 2022-02-02 | End: 2022-03-10 | Stop reason: SDUPTHER

## 2022-02-02 ASSESSMENT — PAIN SCALES - GENERAL: PAINLEVEL_OUTOF10: 3

## 2022-02-02 NOTE — ED PROVIDER NOTES
[address]      Pt Name: Vasyl Summers  MRN: [id number]  [place] 1980  Date of evaluation: 2/2/2022      CHIEF COMPLAINT       Chief Complaint   Patient presents with    Nausea     X2-3 months. GI appt on tuesday.  Abdominal Pain     dark/sticky stools X 1 week inconsistent. HISTORY OF PRESENT ILLNESS      The patient presents with dark tarry stools for about a week inconsistently. She says sometimes a appears to have dark mixed in with her regular stool. The patient has had nausea for 2 or 3 months. She is going to have upper and lower scope performed next Tuesday. She was worried because of her symptoms and checked on Zhaopin. She was concerned she could have a GI bleed so she thought she should come in. The patient has not had tachycardia or significant dizziness. She does take medicine for blood pressure. She has not had any syncope. She denies significant abdominal discomfort. She does not want to get a CT scan, she just wanted to be checked out. She currently takes Protonix. REVIEW OF SYSTEMS       All systems reviewed and negative unless noted in HPI. The patient denies fever or constitutional symptoms. Denies vision change. Denies any sore throat or rhinorrhea. Denies any neck pain or stiffness. Denies chest pain or shortness of breath. Recent nausea. Dark stools as noted in HPI. Denies any dysuria. Denies urinary frequency or hematuria. Denies musculoskeletal injury or pain. Denies any weakness, numbness or focal neurologic deficit. Denies any skin rash or edema. No recent psychiatric issues. History of anxiety. No easy bruising or bleeding. Denies any polyuria, polydypsia or history of immunocompromise. PAST MEDICAL HISTORY    has a past medical history of Asthma, Cancer (HCC)-skin, Fibromyalgia, Hyperlipidemia, Myofascial pain, and Thyroid nodule.     SURGICAL HISTORY      has a past surgical history that includes Skin cancer excision and Thyroidectomy. CURRENT MEDICATIONS       Previous Medications    AMITRIPTYLINE (ELAVIL) 50 MG TABLET        CALCIUM CARBONATE-VITAMIN D (OYSTER SHELL CALCIUM/D) 500-200 MG-UNIT TABS    Take 1 tablet by mouth 3 times daily    GABAPENTIN (NEURONTIN) 100 MG CAPSULE    Take 1 capsule by mouth 2 times daily for 5 days. Intended supply: 90 days    GREEN  MG CAPS    Take 250 mg by mouth daily as needed    LORAZEPAM (ATIVAN) 0.5 MG TABLET    LORazepam 0.5 MG Oral Tablet  TAKE 1 TABLET DAILY AS NEEDED. Quantity: 30 Refills: 0     Princella Latonia D.O.; Active    MAGNESIUM GLYCINATE PO    Take 400 mg by mouth daily as needed    MELOXICAM (MOBIC) 7.5 MG TABLET    TAKE 1 TABLET BY MOUTH TWICE DAILY AS NEEDED FOR PAIN    METOPROLOL SUCCINATE (TOPROL XL) 25 MG EXTENDED RELEASE TABLET    50 mg     PANTOPRAZOLE (PROTONIX) 40 MG TABLET        PANTOPRAZOLE SODIUM (PROTONIX) 40 MG PACK PACKET    1 tablet    THYROID (ARMOUR THYROID) 90 MG TABLET    Take 90 mg by mouth daily    TIZANIDINE (ZANAFLEX) 2 MG TABLET    TAKE 1 TABLET BY MOUTH THREE TIMES DAILY AS NEEDED    VITAMIN E 180 MG (400 UNIT) CAPS CAPSULE    Take 180 mg by mouth daily       ALLERGIES     is allergic to penicillins and sudafed [pseudoephedrine hcl]. FAMILY HISTORY     has no family status information on file. family history is not on file. SOCIAL HISTORY      reports that she has never smoked. She has never used smokeless tobacco. She reports previous alcohol use. She reports previous drug use. Drug: Marijuana Mihir Orellana). PHYSICAL EXAM     INITIAL VITALS:  height is 5' 3\" (1.6 m) and weight is 83 kg (183 lb). Her oral temperature is 98.4 °F (36.9 °C). Her blood pressure is 141/88 (abnormal) and her pulse is 78. Her respiration is 16 and oxygen saturation is 98%. The patient is alert and oriented, in no apparent distress. HEENT is atraumatic. Pupils are PERRL at 4 mm.    Mucous membranes moist.    Neck is supple. Heart sounds regular rate and rhythm with no gallops, murmurs, or rubs. Lungs clear, no wheezes, rales or rhonchi. Abdomen: soft, nontender with no pain to palpation. No pulsatile mass. Normal bowel sounds are noted. No rebound or guarding. Rectal exam: No external hemorrhoids. No vivian blood. Hemoccult negative. Musculoskeletal exam shows no evidence of trauma. Normal distal pulses in all extremities. Skin: no rash or edema. Neurological exam reveals cranial nerves 2 through 12 grossly intact. Patient has equal  and normal deep tendon reflexes. Psychiatric: Appropriate. Lymphatics.:  No lymphadenopathy.          DIFFERENTIAL DIAGNOSIS/ MDM:     GI bleed, anemia    DIAGNOSTIC RESULTS       LABS:  Results for orders placed or performed during the hospital encounter of 02/02/22   CBC Auto Differential   Result Value Ref Range    WBC 9.0 3.5 - 11.0 k/uL    RBC 4.22 4.0 - 5.2 m/uL    Hemoglobin 12.9 12.0 - 16.0 g/dL    Hematocrit 38.7 36 - 46 %    MCV 91.5 80 - 100 fL    MCH 30.6 26 - 34 pg    MCHC 33.4 31 - 37 g/dL    RDW 13.4 12.5 - 15.4 %    Platelets 702 537 - 051 k/uL    MPV 7.6 6.0 - 12.0 fL    NRBC Automated NOT REPORTED per 100 WBC    Differential Type NOT REPORTED     Seg Neutrophils 67 (H) 36 - 66 %    Lymphocytes 24 24 - 44 %    Monocytes 8 2 - 11 %    Eosinophils % 0 (L) 1 - 4 %    Basophils 1 0 - 2 %    Immature Granulocytes NOT REPORTED 0 %    Segs Absolute 6.00 1.8 - 7.7 k/uL    Absolute Lymph # 2.20 1.0 - 4.8 k/uL    Absolute Mono # 0.80 0.1 - 1.2 k/uL    Absolute Eos # 0.00 0.0 - 0.4 k/uL    Basophils Absolute 0.10 0.0 - 0.2 k/uL    Absolute Immature Granulocyte NOT REPORTED 0.00 - 0.30 k/uL    WBC Morphology NOT REPORTED     RBC Morphology NOT REPORTED     Platelet Estimate NOT REPORTED    Comprehensive Metabolic Panel   Result Value Ref Range    Glucose 132 (H) 70 - 99 mg/dL    BUN 16 6 - 20 mg/dL    CREATININE 0.62 0.50 - 0.90 mg/dL Bun/Cre Ratio NOT REPORTED 9 - 20    Calcium 9.2 8.6 - 10.4 mg/dL    Sodium 138 135 - 144 mmol/L    Potassium 4.2 3.7 - 5.3 mmol/L    Chloride 106 98 - 107 mmol/L    CO2 21 20 - 31 mmol/L    Anion Gap 11 9 - 17 mmol/L    Alkaline Phosphatase 68 35 - 104 U/L    ALT 11 5 - 33 U/L    AST 12 <32 U/L    Total Bilirubin 0.37 0.3 - 1.2 mg/dL    Total Protein 6.9 6.4 - 8.3 g/dL    Albumin 4.0 3.5 - 5.2 g/dL    Albumin/Globulin Ratio 1.4 1.0 - 2.5    GFR Non-African American >60 >60 mL/min    GFR African American >60 >60 mL/min    GFR Comment          GFR Staging NOT REPORTED          EMERGENCY DEPARTMENT COURSE:   Vitals:    Vitals:    02/02/22 0807   BP: (!) 141/88   Pulse: 78   Resp: 16   Temp: 98.4 °F (36.9 °C)   TempSrc: Oral   SpO2: 98%   Weight: 83 kg (183 lb)   Height: 5' 3\" (1.6 m)     -------------------------  BP: (!) 141/88, Temp: 98.4 °F (36.9 °C), Pulse: 78, Resp: 16      Re-evaluation Notes    The patient's labs are reassuring. She has for some medicine for nausea so I written for Zofran. She should follow-up with her gastroenterologist as scheduled next week. The patient is discharged in good condition. FINAL IMPRESSION      1.  Generalized abdominal pain          DISPOSITION/PLAN   DISPOSITION Decision To Discharge 02/02/2022 08:42:24 AM      Condition on Disposition    good    PATIENT REFERRED TO:  Ivonne Kumar, [address]  [phone]      as scheduled      DISCHARGE MEDICATIONS:  New Prescriptions    ONDANSETRON (ZOFRAN ODT) 4 MG DISINTEGRATING TABLET    Take 1 tablet by mouth every 8 hours as needed for Nausea       (Please note that portions of this note were completed with a voice recognition program.  Efforts were made to edit the dictations but occasionally words are mis-transcribed.)    Karina Tsai MD,, MD   Attending Emergency Physician         Aline Barrera MD  02/02/22 2403

## 2022-02-11 ENCOUNTER — APPOINTMENT (OUTPATIENT)
Dept: GENERAL RADIOLOGY | Age: 42
End: 2022-02-11
Payer: COMMERCIAL

## 2022-02-11 ENCOUNTER — HOSPITAL ENCOUNTER (EMERGENCY)
Age: 42
Discharge: HOME | End: 2022-02-11
Attending: SPECIALIST
Payer: COMMERCIAL

## 2022-02-11 VITALS
HEART RATE: 71 BPM | WEIGHT: 180 LBS | DIASTOLIC BLOOD PRESSURE: 96 MMHG | OXYGEN SATURATION: 100 % | RESPIRATION RATE: 16 BRPM | SYSTOLIC BLOOD PRESSURE: 136 MMHG | BODY MASS INDEX: 31.89 KG/M2 | TEMPERATURE: 98 F | HEIGHT: 63 IN

## 2022-02-11 DIAGNOSIS — R10.13 EPIGASTRIC PAIN: Primary | ICD-10-CM

## 2022-02-11 DIAGNOSIS — F41.1 ANXIETY STATE: ICD-10-CM

## 2022-02-11 LAB
-: ABNORMAL
ABSOLUTE EOS #: 0.1 K/UL (ref 0–0.4)
ABSOLUTE IMMATURE GRANULOCYTE: NORMAL K/UL (ref 0–0.3)
ABSOLUTE LYMPH #: 2.4 K/UL (ref 1–4.8)
ABSOLUTE MONO #: 0.7 K/UL (ref 0.1–1.2)
ALBUMIN SERPL-MCNC: 3.8 G/DL (ref 3.5–5.2)
ALBUMIN/GLOBULIN RATIO: 1.4 (ref 1–2.5)
ALP BLD-CCNC: 65 U/L (ref 35–104)
ALT SERPL-CCNC: 15 U/L (ref 5–33)
AMORPHOUS: ABNORMAL
ANION GAP SERPL CALCULATED.3IONS-SCNC: 11 MMOL/L (ref 9–17)
AST SERPL-CCNC: 13 U/L
BACTERIA: ABNORMAL
BASOPHILS # BLD: 1 % (ref 0–2)
BASOPHILS ABSOLUTE: 0.1 K/UL (ref 0–0.2)
BILIRUB SERPL-MCNC: 0.42 MG/DL (ref 0.3–1.2)
BILIRUBIN URINE: NEGATIVE
BUN BLDV-MCNC: 11 MG/DL (ref 6–20)
BUN/CREAT BLD: ABNORMAL (ref 9–20)
CALCIUM SERPL-MCNC: 9.4 MG/DL (ref 8.6–10.4)
CASTS UA: ABNORMAL /LPF
CHLORIDE BLD-SCNC: 105 MMOL/L (ref 98–107)
CO2: 24 MMOL/L (ref 20–31)
COLOR: YELLOW
COMMENT UA: ABNORMAL
CREAT SERPL-MCNC: 0.64 MG/DL (ref 0.5–0.9)
CRYSTALS, UA: ABNORMAL /HPF
DIFFERENTIAL TYPE: NORMAL
EOSINOPHILS RELATIVE PERCENT: 1 % (ref 1–4)
EPITHELIAL CELLS UA: ABNORMAL /HPF (ref 0–5)
GFR AFRICAN AMERICAN: >60 ML/MIN
GFR NON-AFRICAN AMERICAN: >60 ML/MIN
GFR SERPL CREATININE-BSD FRML MDRD: ABNORMAL ML/MIN/{1.73_M2}
GFR SERPL CREATININE-BSD FRML MDRD: ABNORMAL ML/MIN/{1.73_M2}
GLUCOSE BLD-MCNC: 113 MG/DL (ref 70–99)
GLUCOSE URINE: NEGATIVE
HCT VFR BLD CALC: 37.5 % (ref 36–46)
HEMOGLOBIN: 12.5 G/DL (ref 12–16)
IMMATURE GRANULOCYTES: NORMAL %
KETONES, URINE: NEGATIVE
LEUKOCYTE ESTERASE, URINE: ABNORMAL
LIPASE: 26 U/L (ref 13–60)
LYMPHOCYTES # BLD: 31 % (ref 24–44)
MCH RBC QN AUTO: 30.1 PG (ref 26–34)
MCHC RBC AUTO-ENTMCNC: 33.2 G/DL (ref 31–37)
MCV RBC AUTO: 90.8 FL (ref 80–100)
MONOCYTES # BLD: 9 % (ref 2–11)
MUCUS: ABNORMAL
NITRITE, URINE: NEGATIVE
NRBC AUTOMATED: NORMAL PER 100 WBC
OTHER OBSERVATIONS UA: ABNORMAL
PDW BLD-RTO: 13 % (ref 12.5–15.4)
PH UA: 7.5 (ref 5–8)
PLATELET # BLD: 381 K/UL (ref 140–450)
PLATELET ESTIMATE: NORMAL
PMV BLD AUTO: 7.6 FL (ref 6–12)
POTASSIUM SERPL-SCNC: 3.9 MMOL/L (ref 3.7–5.3)
PROTEIN UA: NEGATIVE
RBC # BLD: 4.13 M/UL (ref 4–5.2)
RBC # BLD: NORMAL 10*6/UL
RBC UA: ABNORMAL /HPF (ref 0–2)
RENAL EPITHELIAL, UA: ABNORMAL /HPF
SEG NEUTROPHILS: 58 % (ref 36–66)
SEGMENTED NEUTROPHILS ABSOLUTE COUNT: 4.3 K/UL (ref 1.8–7.7)
SODIUM BLD-SCNC: 140 MMOL/L (ref 135–144)
SPECIFIC GRAVITY UA: 1.01 (ref 1–1.03)
THYROXINE, FREE: 1.2 NG/DL (ref 0.93–1.7)
TOTAL PROTEIN: 6.5 G/DL (ref 6.4–8.3)
TRICHOMONAS: ABNORMAL
TROPONIN INTERP: NORMAL
TROPONIN T: NORMAL NG/ML
TROPONIN, HIGH SENSITIVITY: <6 NG/L (ref 0–14)
TSH SERPL DL<=0.05 MIU/L-ACNC: 0.26 MIU/L (ref 0.3–5)
TURBIDITY: CLEAR
URINE HGB: NEGATIVE
UROBILINOGEN, URINE: NORMAL
WBC # BLD: 7.5 K/UL (ref 3.5–11)
WBC # BLD: NORMAL 10*3/UL
WBC UA: ABNORMAL /HPF (ref 0–5)
YEAST: ABNORMAL

## 2022-02-11 PROCEDURE — 84443 ASSAY THYROID STIM HORMONE: CPT

## 2022-02-11 PROCEDURE — 74022 RADEX COMPL AQT ABD SERIES: CPT

## 2022-02-11 PROCEDURE — 6370000000 HC RX 637 (ALT 250 FOR IP): Performed by: SPECIALIST

## 2022-02-11 PROCEDURE — 84484 ASSAY OF TROPONIN QUANT: CPT

## 2022-02-11 PROCEDURE — 93005 ELECTROCARDIOGRAM TRACING: CPT | Performed by: SPECIALIST

## 2022-02-11 PROCEDURE — 83690 ASSAY OF LIPASE: CPT

## 2022-02-11 PROCEDURE — 99284 EMERGENCY DEPT VISIT MOD MDM: CPT

## 2022-02-11 PROCEDURE — 2580000003 HC RX 258: Performed by: SPECIALIST

## 2022-02-11 PROCEDURE — 81001 URINALYSIS AUTO W/SCOPE: CPT

## 2022-02-11 PROCEDURE — 96361 HYDRATE IV INFUSION ADD-ON: CPT

## 2022-02-11 PROCEDURE — 96360 HYDRATION IV INFUSION INIT: CPT

## 2022-02-11 PROCEDURE — 85025 COMPLETE CBC W/AUTO DIFF WBC: CPT

## 2022-02-11 PROCEDURE — 80053 COMPREHEN METABOLIC PANEL: CPT

## 2022-02-11 PROCEDURE — 84439 ASSAY OF FREE THYROXINE: CPT

## 2022-02-11 PROCEDURE — 36415 COLL VENOUS BLD VENIPUNCTURE: CPT

## 2022-02-11 RX ORDER — 0.9 % SODIUM CHLORIDE 0.9 %
1000 INTRAVENOUS SOLUTION INTRAVENOUS ONCE
Status: COMPLETED | OUTPATIENT
Start: 2022-02-11 | End: 2022-02-11

## 2022-02-11 RX ORDER — SERTRALINE HYDROCHLORIDE 25 MG/1
25 TABLET, FILM COATED ORAL DAILY
Status: ON HOLD | COMMUNITY
End: 2022-03-10

## 2022-02-11 RX ORDER — ASPIRIN 81 MG/1
324 TABLET, CHEWABLE ORAL ONCE
Status: COMPLETED | OUTPATIENT
Start: 2022-02-11 | End: 2022-02-11

## 2022-02-11 RX ORDER — ONDANSETRON 2 MG/ML
4 INJECTION INTRAMUSCULAR; INTRAVENOUS ONCE
Status: DISCONTINUED | OUTPATIENT
Start: 2022-02-11 | End: 2022-02-11 | Stop reason: HOSPADM

## 2022-02-11 RX ADMIN — SODIUM CHLORIDE 1000 ML: 9 INJECTION, SOLUTION INTRAVENOUS at 07:02

## 2022-02-11 RX ADMIN — ASPIRIN 81 MG 324 MG: 81 TABLET ORAL at 07:03

## 2022-02-11 ASSESSMENT — PAIN DESCRIPTION - ORIENTATION: ORIENTATION: LEFT

## 2022-02-11 ASSESSMENT — PAIN SCALES - GENERAL: PAINLEVEL_OUTOF10: 5

## 2022-02-11 ASSESSMENT — PAIN DESCRIPTION - LOCATION: LOCATION: ABDOMEN

## 2022-02-11 ASSESSMENT — PAIN DESCRIPTION - PAIN TYPE: TYPE: ACUTE PAIN

## 2022-02-11 NOTE — ED PROVIDER NOTES
[address] [facility]  [address] [facility] [address]  Phone: [phone]  Fax: [phone]        ADDENDUM:      Care of this patient was assumed from Dr. Santa Yee. The patient was seen for Tachycardia (Pt c/o tachycardia, hypertension, chest pain, and nausea), Hypertension, Chest Pain, and Nausea  . The patient's initial evaluation and plan have been discussed with the prior provider who initially evaluated the patient. Nursing Notes, Past Medical Hx, Past Surgical Hx, Allergies, were all reviewed. PAST MEDICAL HISTORY    has a past medical history of Anxiety, Asthma, Cancer (HCC)-skin, Depression, Fibromyalgia, Hypertension, Myofascial pain, and Thyroid nodule. SURGICAL HISTORY      has a past surgical history that includes Skin cancer excision and Thyroidectomy. CURRENT MEDICATIONS       Previous Medications    GABAPENTIN (NEURONTIN) 100 MG CAPSULE    Take 1 capsule by mouth 2 times daily for 5 days. Intended supply: 90 days    LORAZEPAM (ATIVAN) 0.5 MG TABLET    LORazepam 0.5 MG Oral Tablet  TAKE 1 TABLET DAILY AS NEEDED.    Quantity: 30 Refills: 0     Bhargavi Channahon D.O.; Active    MAGNESIUM GLYCINATE PO    Take 400 mg by mouth daily as needed    MELOXICAM (MOBIC) 7.5 MG TABLET    TAKE 1 TABLET BY MOUTH TWICE DAILY AS NEEDED FOR PAIN    METOPROLOL SUCCINATE (TOPROL XL) 25 MG EXTENDED RELEASE TABLET    50 mg     ONDANSETRON (ZOFRAN ODT) 4 MG DISINTEGRATING TABLET    Take 1 tablet by mouth every 8 hours as needed for Nausea    PANTOPRAZOLE (PROTONIX) 40 MG TABLET        PANTOPRAZOLE SODIUM (PROTONIX) 40 MG PACK PACKET    1 tablet    SERTRALINE (ZOLOFT) 25 MG TABLET    Take 25 mg by mouth daily    THYROID (ARMOUR THYROID) 90 MG TABLET    Take 90 mg by mouth daily    TIZANIDINE (ZANAFLEX) 2 MG TABLET    TAKE 1 TABLET BY MOUTH THREE TIMES DAILY AS NEEDED    VITAMIN E 180 MG (400 UNIT) CAPS CAPSULE    Take 180 mg by mouth daily       ALLERGIES     is allergic to penicillins and sudafed [pseudoephedrine hcl].       Diagnostic Results       LABS:   Results for orders placed or performed during the hospital encounter of 02/11/22   CBC Auto Differential   Result Value Ref Range    WBC 7.5 3.5 - 11.0 k/uL    RBC 4.13 4.0 - 5.2 m/uL    Hemoglobin 12.5 12.0 - 16.0 g/dL    Hematocrit 37.5 36 - 46 %    MCV 90.8 80 - 100 fL    MCH 30.1 26 - 34 pg    MCHC 33.2 31 - 37 g/dL    RDW 13.0 12.5 - 15.4 %    Platelets 712 578 - 423 k/uL    MPV 7.6 6.0 - 12.0 fL    NRBC Automated NOT REPORTED per 100 WBC    Differential Type NOT REPORTED     Seg Neutrophils 58 36 - 66 %    Lymphocytes 31 24 - 44 %    Monocytes 9 2 - 11 %    Eosinophils % 1 1 - 4 %    Basophils 1 0 - 2 %    Immature Granulocytes NOT REPORTED 0 %    Segs Absolute 4.30 1.8 - 7.7 k/uL    Absolute Lymph # 2.40 1.0 - 4.8 k/uL    Absolute Mono # 0.70 0.1 - 1.2 k/uL    Absolute Eos # 0.10 0.0 - 0.4 k/uL    Basophils Absolute 0.10 0.0 - 0.2 k/uL    Absolute Immature Granulocyte NOT REPORTED 0.00 - 0.30 k/uL    WBC Morphology NOT REPORTED     RBC Morphology NOT REPORTED     Platelet Estimate NOT REPORTED    Comprehensive Metabolic Panel w/ Reflex to MG   Result Value Ref Range    Glucose 113 (H) 70 - 99 mg/dL    BUN 11 6 - 20 mg/dL    CREATININE 0.64 0.50 - 0.90 mg/dL    Bun/Cre Ratio NOT REPORTED 9 - 20    Calcium 9.4 8.6 - 10.4 mg/dL    Sodium 140 135 - 144 mmol/L    Potassium 3.9 3.7 - 5.3 mmol/L    Chloride 105 98 - 107 mmol/L    CO2 24 20 - 31 mmol/L    Anion Gap 11 9 - 17 mmol/L    Alkaline Phosphatase 65 35 - 104 U/L    ALT 15 5 - 33 U/L    AST 13 <32 U/L    Total Bilirubin 0.42 0.3 - 1.2 mg/dL    Total Protein 6.5 6.4 - 8.3 g/dL    Albumin 3.8 3.5 - 5.2 g/dL    Albumin/Globulin Ratio 1.4 1.0 - 2.5    GFR Non-African American >60 >60 mL/min    GFR African American >60 >60 mL/min    GFR Comment          GFR Staging NOT REPORTED    Lipase   Result Value Ref Range    Lipase 26 13 - 60 U/L   Troponin   Result Value Ref Range    Troponin, High Sensitivity <6 0 - 14 ng/L    Troponin T NOT REPORTED <0.03 ng/mL    Troponin Interp NOT REPORTED    Urinalysis Reflex to Culture    Specimen: Urine, clean catch   Result Value Ref Range    Color, UA Yellow Yellow    Turbidity UA Clear Clear    Glucose, Ur NEGATIVE NEGATIVE    Bilirubin Urine NEGATIVE NEGATIVE    Ketones, Urine NEGATIVE NEGATIVE    Specific Gravity, UA 1.010 1.005 - 1.030    Urine Hgb NEGATIVE NEGATIVE    pH, UA 7.5 5.0 - 8.0    Protein, UA NEGATIVE NEGATIVE    Urobilinogen, Urine Normal Normal    Nitrite, Urine NEGATIVE NEGATIVE    Leukocyte Esterase, Urine TRACE (A) NEGATIVE    Urinalysis Comments NOT REPORTED    TSH without Reflex   Result Value Ref Range    TSH 0.26 (L) 0.30 - 5.00 mIU/L   Microscopic Urinalysis   Result Value Ref Range    -          WBC, UA 2 TO 5 0 - 5 /HPF    RBC, UA 0 TO 2 0 - 2 /HPF    Casts UA NOT REPORTED /LPF    Crystals, UA NOT REPORTED None /HPF    Epithelial Cells UA 10 TO 20 0 - 5 /HPF    Renal Epithelial, UA NOT REPORTED 0 /HPF    Bacteria, UA MODERATE (A) None    Mucus, UA NOT REPORTED None    Trichomonas, UA NOT REPORTED None    Amorphous, UA NOT REPORTED None    Other Observations UA (A) NOT REQ. Utilizing a urinalysis as the only screening method to exclude a potential uropathogen can be unreliable in many patient populations. Rapid screening tests are less sensitive than culture and if UTI is a clinical possibility, culture should be considered despite a negative urinalysis. Yeast, UA NOT REPORTED None       RADIOLOGY:  XR ACUTE ABD SERIES CHEST 1 VW   Final Result   1. Moderate stool burden. Nonobstructive bowel-gas pattern. 2. No acute focal airspace consolidation. XR ACUTE ABD SERIES CHEST 1 VW (Final result)  Result time 02/11/22 07:42:47  Final result by Frederic Mendez MD (02/11/22 07:42:47)                Impression:    1. Moderate stool burden.  Nonobstructive bowel-gas pattern. 2. No acute focal airspace consolidation. Narrative:    EXAMINATION:   TWO XRAY VIEWS OF THE ABDOMEN AND SINGLE  XRAY VIEW OF THE CHEST     2/11/2022 6:51 am     COMPARISON:   CT abdomen pelvis from 12/17/2021, portable chest from 07/22/2021     HISTORY:   ORDERING SYSTEM PROVIDED HISTORY: Epigastric abd pain, chest pain   TECHNOLOGIST PROVIDED HISTORY:   Epigastric abd pain, chest pain   Reason for Exam: Epigastric abd pain, chest pain   Relevant Medical/Surgical History: hx: thyroid cancer     68-year-old female with epigastric abdominal pain and chest pain     FINDINGS:   Cardiac and mediastinal contours within normal limits.  No free air.  No   pneumothorax.  No acute focal airspace consolidation or pleural effusions. No acute osseous abnormality identified. No free air below the diaphragm.  Psoas shadows symmetric in appearance. Moderate stool burden.  No abnormal calcifications overlying the expected   locations of the bilateral renal parenchyma.  Distal gas and stool project   over the rectal vault.  Mild bilateral hip osteoarthrosis.  No abnormally   dilated small bowel loops.                       RECENT VITALS:  BP: (!) 140/95, Temp: 98 °F (36.7 °C), Pulse: 71, Resp: 16     ED Course     The patient was given the following medications:  Orders Placed This Encounter   Medications    0.9 % sodium chloride bolus    ondansetron (ZOFRAN) injection 4 mg    aspirin chewable tablet 324 mg       Medical Decision Making      The patient presents with epigastric abdominal discomfort radiating to the chest that started this morning. The patient has had diaphoresis. She says her blood pressure last night was low, so she did not take her blood pressure medicine this morning. She says it was high today. She said her heart was also racing which concerned her. She denies Covid-like symptoms. Dr. Roger Pelletier asked that I follow-up on her abdominal work-up and cardiac work-up.     EMERGENCY DEPARTMENT COURSE:   Vitals:    Vitals:    02/11/22 9504 02/11/22 0652 02/11/22 0825   BP: (!) 144/107 128/89 (!) 140/95   Pulse: 100 71    Resp: 16 16    Temp: 98 °F (36.7 °C)     SpO2: 100% 99% 99%   Weight: 81.6 kg (180 lb)     Height: 5' 3\" (1.6 m)       -------------------------  BP: (!) 140/95, Temp: 98 °F (36.7 °C), Pulse: 71, Resp: 16      RE-EVALUATION:    The patient and I spoke at length about her situation. The patient's thyroid regulation is of concern. Also, she is very worried about having pancreatic cancer. Her gastroenterologist, with whom she has an appointment next Thursday, told her he can give her testing for pancreatic issues so this will be taken care of as an outpatient. She does not want any further imaging of her abdomen today. The patient was given reassurance about her labs today. She has an appointment to see her PCP next week as well. We gave her referral to the crisis line for mental health issues. She should continue the Zoloft and follow-up with her psychiatrist. She has only been on the medicine for less than a week, so it has not had time to become part of her metabolic milieu. The patient is discharged in good condition. PROCEDURES:  None    FINAL IMPRESSION      1. Epigastric pain    2.  Anxiety state          DISPOSITION/PLAN   DISPOSITION Decision To Discharge 02/11/2022 08:46:28 AM      CONDITION ON DISPOSITION:     good    PATIENT REFERRED TO:  Anup Garcia MD  [address] [facility]  [phone]      as scheduled      DISCHARGE MEDICATIONS:  New Prescriptions    No medications on file       (Please note that portions of this note were completed with a voicerecognition program.  Efforts were made to edit the dictations but occasionally words are mis-transcribed.)    Linden Rizo MD  Attending Emergency Medicine Physician                     Luli Man MD  02/11/22 0454

## 2022-02-11 NOTE — ED PROVIDER NOTES
Dallas Rizzo 1778 ENCOUNTER      Pt Name: Dusty Mello  MRN: [id number]  [place] 1980  Date of evaluation: 2/11/22      CHIEF COMPLAINT       Chief Complaint   Patient presents with    Tachycardia     Pt c/o tachycardia, hypertension, chest pain, and nausea    Hypertension    Chest Pain    Nausea         HISTORY OF PRESENT ILLNESS    Dusty Mello is a 46 y.o. female who presents to the emergency department for evaluation of epigastric abdominal pain radiating towards the chest upon waking up this morning. Patient has been having abdominal pain off and on and underwent upper endoscopy and colonoscopy 4 days ago. She woke up diaphoretic, shaky and burning epigastric and left upper quadrant pinching pain radiating towards the chest.  Patient held her blood pressure medicine last night as her blood pressure was 90/60. Patient states her blood pressure was 162/94 with heart rate of 134 this morning which concerned her and that she comes to the emergency department. She has slight shortness of breath and nausea but denies any lightheadedness, dizziness, palpitation and denies any swelling in the legs or calf pain. No history of cough, fever or chills. REVIEW OF SYSTEMS       Review of Systems    All systems reviewed and negative unless noted in HPI. The patient denies fever or constitutional symptoms. Denies vision change. Denies any sore throat or rhinorrhea. Denies any neck pain or stiffness. Presents with epigastric and left upper quadrant pain radiating towards the chest chest associated with slight shortness of breath. No vomiting or diarrhea. Denies any dysuria. Denies urinary frequency or hematuria. Denies musculoskeletal injury or pain. Denies any weakness, numbness or focal neurologic deficit. Denies any skin rash or edema. No recent psychiatric issues. No easy bruising or bleeding.    Denies any polyuria, polydypsia      PAST MEDICAL HISTORY    has a past medical history of Anxiety, Asthma, Cancer (HCC)-skin, Depression, Fibromyalgia, Hypertension, Myofascial pain, and Thyroid nodule. SURGICAL HISTORY      has a past surgical history that includes Skin cancer excision and Thyroidectomy. CURRENT MEDICATIONS       Previous Medications    GABAPENTIN (NEURONTIN) 100 MG CAPSULE    Take 1 capsule by mouth 2 times daily for 5 days. Intended supply: 90 days    LORAZEPAM (ATIVAN) 0.5 MG TABLET    LORazepam 0.5 MG Oral Tablet  TAKE 1 TABLET DAILY AS NEEDED. Quantity: 30 Refills: 0     Jenae Duran D.O.; Active    MAGNESIUM GLYCINATE PO    Take 400 mg by mouth daily as needed    MELOXICAM (MOBIC) 7.5 MG TABLET    TAKE 1 TABLET BY MOUTH TWICE DAILY AS NEEDED FOR PAIN    METOPROLOL SUCCINATE (TOPROL XL) 25 MG EXTENDED RELEASE TABLET    50 mg     ONDANSETRON (ZOFRAN ODT) 4 MG DISINTEGRATING TABLET    Take 1 tablet by mouth every 8 hours as needed for Nausea    PANTOPRAZOLE (PROTONIX) 40 MG TABLET        PANTOPRAZOLE SODIUM (PROTONIX) 40 MG PACK PACKET    1 tablet    SERTRALINE (ZOLOFT) 25 MG TABLET    Take 25 mg by mouth daily    THYROID (ARMOUR THYROID) 90 MG TABLET    Take 90 mg by mouth daily    TIZANIDINE (ZANAFLEX) 2 MG TABLET    TAKE 1 TABLET BY MOUTH THREE TIMES DAILY AS NEEDED    VITAMIN E 180 MG (400 UNIT) CAPS CAPSULE    Take 180 mg by mouth daily       ALLERGIES     is allergic to penicillins and sudafed [pseudoephedrine hcl]. FAMILY HISTORY     has no family status information on file. family history is not on file. SOCIAL HISTORY      reports that she has never smoked. She has never used smokeless tobacco. She reports previous alcohol use. She reports previous drug use. Drug: Marijuana [facility]). PHYSICAL EXAM     INITIAL VITALS:  height is 5' 3\" (1.6 m) and weight is 81.6 kg (180 lb). Her temperature is 98 °F (36.7 °C). Her blood pressure is 144/107 (abnormal) and her pulse is 100.  Her respiration is 16 and oxygen saturation is 100%. Physical Exam  Vitals and nursing note reviewed. Constitutional:       Appearance: She is well-developed. HENT:      Head: Normocephalic and atraumatic. Nose: Nose normal.   Eyes:      Extraocular Movements: Extraocular movements intact. Pupils: Pupils are equal, round, and reactive to light. Cardiovascular:      Rate and Rhythm: Normal rate and regular rhythm. Heart sounds: Normal heart sounds. No murmur heard. Pulmonary:      Effort: Pulmonary effort is normal. No respiratory distress. Breath sounds: Normal breath sounds. Abdominal:      General: Bowel sounds are normal. There is no distension. Palpations: Abdomen is soft. Tenderness: There is abdominal tenderness in the epigastric area and left upper quadrant. Musculoskeletal:      Cervical back: Normal range of motion and neck supple. Skin:     General: Skin is warm and dry. Neurological:      General: No focal deficit present. Mental Status: She is alert and oriented to person, place, and time. DIFFERENTIAL DIAGNOSIS/ MDM:     Bronchitis, Pneumonia, ACS, PE, Musculoskeletal pain, pneumothorax, thoracic aortic dissection, nonspecific chest pain, gastrointestinal in origin    Cholecystitis, appendicitis, pancreatitis,  urinary tract infection, renal stone, small bowel obstruction,diverticulitis, gastritis, enteritis, colitis. DIAGNOSTIC RESULTS     EKG: All EKG's are interpreted by the Emergency Department Physician who either signs or Co-signs this chart in the absence of a cardiologist.    EKG Interpretation    Interpreted by me    Rhythm: normal sinus   Rate: normal  Axis: normal  Ectopy: none  Conduction: normal  ST Segments: no acute change  T Waves: no acute change  Q Waves: none    Clinical Impression: no acute changes and normal EKG    RADIOLOGY:   Interpretation per the Radiologist below, if available at the time of this note:    No results found.       LABS:  No results found for this visit on 02/11/22. Lab results pending. EMERGENCY DEPARTMENT COURSE:   Vitals:    Vitals:    02/11/22 0635   BP: (!) 144/107   Pulse: 100   Resp: 16   Temp: 98 °F (36.7 °C)   SpO2: 100%   Weight: 81.6 kg (180 lb)   Height: 5' 3\" (1.6 m)     -------------------------  BP: (!) 144/107, Temp: 98 °F (36.7 °C), Pulse: 100, Resp: 16    Orders Placed This Encounter   Medications    0.9 % sodium chloride bolus    ondansetron (ZOFRAN) injection 4 mg    aspirin chewable tablet 324 mg         During the ED course, patient will be given normal saline 1 L bolus, Zofran 4 mg IV push and aspirin 324 mg orally. Case will be turned over to Dr. Bryan Roca at the end of my shift for further care and disposition. PROCEDURES:  None    FINAL IMPRESSION    No diagnosis found. DISPOSITION/PLAN       PATIENT REFERRED TO:  No follow-up provider specified. DISCHARGE MEDICATIONS:  New Prescriptions    No medications on file       (Please note that portions of this note were completed with a voice recognition program.  Efforts were made to edit the dictations but occasionally words are mis-transcribed.)    Sarath Feliciano MD,, MD, F.A.C.E.P.   Attending Emergency Medicine Physician     Sarath Feliciano MD  02/11/22 3447

## 2022-02-12 LAB
EKG ATRIAL RATE: 75 BPM
EKG P AXIS: 13 DEGREES
EKG P-R INTERVAL: 146 MS
EKG Q-T INTERVAL: 380 MS
EKG QRS DURATION: 86 MS
EKG QTC CALCULATION (BAZETT): 424 MS
EKG R AXIS: 16 DEGREES
EKG T AXIS: 25 DEGREES
EKG VENTRICULAR RATE: 75 BPM

## 2022-02-14 ENCOUNTER — HOSPITAL ENCOUNTER (OUTPATIENT)
Dept: PHYSICAL THERAPY | Facility: CLINIC | Age: 42
Setting detail: THERAPIES SERIES
End: 2022-02-14
Payer: COMMERCIAL

## 2022-02-15 ENCOUNTER — HOSPITAL ENCOUNTER (OUTPATIENT)
Dept: PHYSICAL THERAPY | Facility: CLINIC | Age: 42
Setting detail: THERAPIES SERIES
Discharge: HOME | End: 2022-02-15
Payer: COMMERCIAL

## 2022-02-15 PROCEDURE — 97140 MANUAL THERAPY 1/> REGIONS: CPT

## 2022-02-15 NOTE — FLOWSHEET NOTE
[] [facility]) - [facility] &  Therapy  [address]  [phone]  F: [phone] [] [address]  [facility]   [address]  P: [phone]  F: [phone] [x] [address] &  Therapy  [address] [facility]  P: [phone]  F: [phone] [] [address]  P: [phone]  F: [phone] [] [address]  [facility]   [address]   [place]: [phone]  F: [phone]      Physical Therapy Daily Treatment Note    Date:  2/15/2022  Patient Name:  Bryan Del Cid    :  1980  MRN: [id number]   Physician: Keisha Sutton DO                        Insurance: Medical [place] - 20 visit limit  Medical Diagnosis: Hip flexor tightness, trigger point of abdomen, abdominal weakness             Rehab Codes: M24.552; R10.9; R19.8  Onset Date: 10/21/21             Next 's appt.: 2022 prn       Visit# / total visits: + (visit 4 in )    Cancels/No Shows:     Subjective:    Pain:  [x] Yes  [] No Location: Abdominal, LB, Hips Pain Rating: (0-10 scale) 5/10 (LB) 0/10 Abdominals (\"Intermittent pain\")    Pain altered Tx:  [] No  [x] Yes  Action:See below  Comments: Pt arrived reporting increased LBP on the L side going in a line from where she was having abdominal pain. She reports she no longer feels abdominal pain, but has pain in the side of her L rib cage into her lower T-spine. She reports she has been compliant with her HEP and is addressing core strength and stabilization at home and the gym, as well as doing mental health therapy 1 time a week., which has been helpful in releasing some trauma and tension.      Objective:  Modalities: Manual therapy 65 min: Resumed CST/MFR to abdomen, including pelvic diaphragm release,  thoracic diaphragm release, SER, VI & D and HCT. Precautions:thyroid, skin cancer, history of trauma  Exercises: rev only  Exercise Reps/ Time Weight/ Level Comments   Add/abd isometrics 3 10 HEP inst   Hip Flexor St Supine 3 30 HEP inst, manual OP   Psoas St Kneeling on Chair 3 20 HEP inst- cues for hip position   Psoas St at Wall 3 20 Cues for hip position    Diaphragmatic Breathing 5 cycles     PPT 10 5    PPT w/ March 10 e  HEP   PPT with Leg Circles To fatigue  HEP   PPT- Alternating UE's 10 e  HEP   Dead Bug  10 e  HEP   Quadruped PPT 10, 5\"     Bird Dog 10x ea     Prone Hip Extension 10x ea     Prone Press Ups 3 10\" HEP   Ball Roll 5 5-10\"    Lumbar Extension and side bending in Standing 10, 5\"  Red SB against wall   Supine Bicycle 20\" x 2     SB PPT 10, 5\"     SB PPT w/ Marches 10     SB PPT w/ Scap Retractions 10 Blue TB Band for HEP   SB Pull Aparts      Palloff Press 20x Yellow Cord    Other:      Treatment Charges: Mins Units   []  Modalities     [x]  Ther Exercise 65 4   []  Manual Therapy     []  Ther Activities     []  Aquatics     []  Vasocompression     []  Other     Total Treatment time 65 4       Assessment: [x] Progressing toward goals. Pt reported good tolerance to manual therapy with reduced tension in low back felt post-treatment. Pt experienced quite a bit of discomfort throughout SER, but reduced post-treatment. Felt light headed, but resolved a few minutes later. Pt able to release a lot of anxiety with SER/MFR this date. Pt reported she felt the treatment is helpful in her healing process and wants to continue with the manual therapy with occasional reminders for ex progression if needed now that she is consistent with doing her HEP. [] No change. [] Other:  [x] Patient would continue to benefit from skilled physical therapy services in order to: reduce pain in her abdomen, reduce anxiety,  increase LROM, increase core strength to return to ADLs and a better quality of life.        STG: (to be met in 8-10 treatments)  1. ? Pain: in abdomen by any  2. ? ROM: L-Spine, B hip flexors by 10-20%  3. ? Strength: Glut max/core by 1/2 m. Grade or more  4. ? Function: per patient report  5. Patient to be independent with home exercise program as demonstrated by performance with correct form without cues. LTG: (to be met in 18+ treatments)  1. Minimize pain with ADLs  2. Increase LROM, LE flexibility to WFLs  3. Increase core strength to 4-/5 or greater  4. Independent with progressed HEP      Pt. Education:  [x] Yes  [] No  [x] Reviewed Prior HEP/Ed  Method of Education: [x] Verbal  [x] Demo  [x] Written  Comprehension of Education:  [x] Verbalizes understanding. [x] Demonstrates understanding. [x] Needs review. [] Demonstrates/verbalizes HEP/Ed previously given. Plan: [] Continue current frequency toward long and short term goals.     [x] Specific Instructions for subsequent treatments: Continue treatment per POC, Dry Needling with primary PT      Time In: 1500           Time Out: 4604    Electronically signed by:  Cheryl Maria, PT

## 2022-02-18 ENCOUNTER — NURSE TRIAGE (OUTPATIENT)
Dept: OTHER | Facility: CLINIC | Age: 42
End: 2022-02-18

## 2022-02-18 NOTE — TELEPHONE ENCOUNTER
Subjective: Caller states \"Should I go to the ER or not\"     Current Symptoms: Caller reports she has seen numerous times at the ED and the GI office since January. She wilkins shave a follow up appointment in 2 days. Caller states she wants to go to the ED to make sure she does not have pancreatic cancer. She reports LUQ abdominal pain for the past several months. This pain occurs after she eats. Onset: Since January    Associated Symptoms: Decreased appetite    Pain Severity: 3/10 pain occurs after eating meals for 20 minutes    Temperature: Denies fever    What has been tried: Seeing a GI doc, eating smaller meals, protein shakes    LMP: NA Pregnant: NA    Recommended disposition: Follow up in 24 hours. Advised caller to see    Care advice provided, patient verbalizes understanding; denies any other questions or concerns; instructed to call back for any new or worsening symptoms. Provided reassurance to caller     This triage is a result of a call to [address] [place] [address]. Please do not respond to the triage nurse through this encounter. Any subsequent communication should be directly with the patient.       Reason for Disposition   [1] MILD pain (e.g., does not interfere with normal activities) AND [2] comes and goes (cramps) AND [3] present > 72 hours  (Exception: this same abdominal pain is a chronic symptom recurrent or ongoing AND present > 4 weeks)    Protocols used: ABDOMINAL PAIN - UPPER-ADULT-

## 2022-02-21 ENCOUNTER — APPOINTMENT (OUTPATIENT)
Dept: PHYSICAL THERAPY | Facility: CLINIC | Age: 42
End: 2022-02-21
Payer: COMMERCIAL

## 2022-02-28 ENCOUNTER — HOSPITAL ENCOUNTER (OUTPATIENT)
Dept: PHYSICAL THERAPY | Facility: CLINIC | Age: 42
Setting detail: THERAPIES SERIES
Discharge: HOME | End: 2022-02-28
Payer: COMMERCIAL

## 2022-02-28 PROCEDURE — 97140 MANUAL THERAPY 1/> REGIONS: CPT

## 2022-02-28 NOTE — FLOWSHEET NOTE
[] [facility]) [facility] &  Therapy  [address]  [phone]  F: [phone] [] [address]  [facility]   [address]  P: [phone]  F: [phone] [x] [address] &  Therapy  [address]  P: [phone]  F: [phone] [] [address]  P: [phone]  F: [phone] [] [address]  [facility]   [address]   [place]: [phone]  F: [phone]      Physical Therapy Daily Treatment Note    Date:  2022  Patient Name:  Jessica Spear    :  1980  MRN: [id number]   Physician: Ricardo Rubin DO                        Insurance: Medical [place] - 20 visit limit  Medical Diagnosis: Hip flexor tightness, trigger point of abdomen, abdominal weakness             Rehab Codes: M24.552; R10.9; R19.8  Onset Date: 10/21/21             Next 's appt.: 2022 prn       Visit# / total visits: + (visit 5 in )    Cancels/No Shows:     Subjective:    Pain:  [x] Yes  [] No Location: Abdominal, LB, Hips Pain Rating: (0-10 scale) 3/10 (LB) 0/10 Abdominals (\"Intermittent pain\")    Pain altered Tx:  [] No  [x] Yes  Action:See below  Comments: Pt arrived reporting her pain levels are down since last session. She has done a session with MFR and states it was helpful, she is planning on doing that for a bit. . She reports she no longer feels abdominal pain, but has some pain in the side of her L rib cage into her lower T-spine. She reports she has been compliant with her HEP and is addressing core strength and stabilization at home and the gym, as well as doing mental health therapy 1 time a week., which has been helpful in releasing some trauma and tension.      Objective:  Modalities: Manual therapy 45 min: Resumed CST/MFR to abdomen, including pelvic diaphragm release,  thoracic diaphragm release, SER, VI & D and HCT. Precautions:thyroid, skin cancer, history of trauma  Exercises: rev only  Exercise Reps/ Time Weight/ Level Comments   Add/abd isometrics 3 10 HEP inst   Hip Flexor St Supine 3 30 HEP inst, manual OP   Psoas St Kneeling on Chair 3 20 HEP inst- cues for hip position   Psoas St at Wall 3 20 Cues for hip position    Diaphragmatic Breathing 5 cycles     PPT 10 5    PPT w/ March 10 e  HEP   PPT with Leg Circles To fatigue  HEP   PPT- Alternating UE's 10 e  HEP   Dead Bug  10 e  HEP   Quadruped PPT 10, 5\"     Bird Dog 10x ea     Prone Hip Extension 10x ea     Prone Press Ups 3 10\" HEP   Ball Roll 5 5-10\"    Lumbar Extension and side bending in Standing 10, 5\"  Red SB against wall   Supine Bicycle 20\" x 2     SB PPT 10, 5\"     SB PPT w/ Marches 10     SB PPT w/ Scap Retractions 10 Blue TB Band for HEP   SB Pull Aparts      Palloff Press 20x Yellow Cord    Other:      Treatment Charges: Mins Units   []  Modalities     [x]  Ther Exercise 45 3   []  Manual Therapy     []  Ther Activities     []  Aquatics     []  Vasocompression     []  Other     Total Treatment time 45 3       Assessment: [x] Progressing toward goals. Pt reported good tolerance to manual therapy with reduced tension in low back felt post-treatment. Once again, pt experienced some discomfort throughout SER, but reduced post-treatment. Pt able to release a lot of anxiety with SER/MFR this date. Pt reported she felt the treatment is helpful in her healing process and wants to continue with the manual therapy prn. Will follow up after a few MFR sessions. [] No change. [] Other:  [x] Patient would continue to benefit from skilled physical therapy services in order to: reduce pain in her abdomen, reduce anxiety,  increase LROM, increase core strength to return to ADLs and a better quality of life.      STG: (to be met in 8-10 treatments)  1. ? Pain: in abdomen by any  2. ? ROM: L-Spine, B hip flexors by 10-20%  3. ? Strength: Glut max/core by 1/2 m. Grade or more  4. ? Function: per patient report  5. Patient to be independent with home exercise program as demonstrated by performance with correct form without cues. LTG: (to be met in 18+ treatments)  1. Minimize pain with ADLs  2. Increase LROM, LE flexibility to WFLs  3. Increase core strength to 4-/5 or greater  4. Independent with progressed HEP    Pt. Education:  [x] Yes  [] No  [x] Reviewed Prior HEP/Ed  Method of Education: [x] Verbal  [x] Demo  [x] Written  Comprehension of Education:  [x] Verbalizes understanding. [x] Demonstrates understanding. [x] Needs review. [] Demonstrates/verbalizes HEP/Ed previously given. Plan: [] Continue current frequency toward long and short term goals. [x] Specific Instructions for subsequent treatments: Pt to follow up after MFR sessions if she wants additional care.        Time In: 1300           Time Out: 1400    Electronically signed by:  Denver Dasilva, PT

## 2022-03-07 ENCOUNTER — HOSPITAL ENCOUNTER (OUTPATIENT)
Dept: MRI IMAGING | Age: 42
Discharge: HOME | End: 2022-03-09
Payer: COMMERCIAL

## 2022-03-07 DIAGNOSIS — M54.50 MIDLINE LOW BACK PAIN WITHOUT SCIATICA, UNSPECIFIED CHRONICITY: ICD-10-CM

## 2022-03-07 PROCEDURE — 72148 MRI LUMBAR SPINE W/O DYE: CPT

## 2022-03-10 ENCOUNTER — HOSPITAL ENCOUNTER (OUTPATIENT)
Dept: ULTRASOUND IMAGING | Age: 42
Discharge: HOME | End: 2022-03-12
Attending: INTERNAL MEDICINE
Payer: COMMERCIAL

## 2022-03-10 ENCOUNTER — HOSPITAL ENCOUNTER (EMERGENCY)
Age: 42
Discharge: HOME | End: 2022-03-11
Attending: EMERGENCY MEDICINE
Payer: COMMERCIAL

## 2022-03-10 ENCOUNTER — HOSPITAL ENCOUNTER (OUTPATIENT)
Age: 42
Setting detail: OUTPATIENT SURGERY
Discharge: HOME | End: 2022-03-10
Attending: INTERNAL MEDICINE | Admitting: INTERNAL MEDICINE
Payer: COMMERCIAL

## 2022-03-10 ENCOUNTER — ANESTHESIA (OUTPATIENT)
Dept: ENDOSCOPY | Age: 42
End: 2022-03-10
Payer: COMMERCIAL

## 2022-03-10 ENCOUNTER — ANESTHESIA EVENT (OUTPATIENT)
Dept: ENDOSCOPY | Age: 42
End: 2022-03-10
Payer: COMMERCIAL

## 2022-03-10 ENCOUNTER — APPOINTMENT (OUTPATIENT)
Dept: GENERAL RADIOLOGY | Age: 42
End: 2022-03-10
Payer: COMMERCIAL

## 2022-03-10 VITALS
OXYGEN SATURATION: 100 % | HEART RATE: 56 BPM | DIASTOLIC BLOOD PRESSURE: 91 MMHG | RESPIRATION RATE: 13 BRPM | TEMPERATURE: 98.8 F | SYSTOLIC BLOOD PRESSURE: 141 MMHG

## 2022-03-10 VITALS
RESPIRATION RATE: 22 BRPM | DIASTOLIC BLOOD PRESSURE: 81 MMHG | SYSTOLIC BLOOD PRESSURE: 119 MMHG | OXYGEN SATURATION: 98 %

## 2022-03-10 DIAGNOSIS — R10.9 ABDOMINAL PAIN, UNSPECIFIED ABDOMINAL LOCATION: ICD-10-CM

## 2022-03-10 DIAGNOSIS — R10.12 ABDOMINAL PAIN, LEFT UPPER QUADRANT: ICD-10-CM

## 2022-03-10 DIAGNOSIS — E86.0 DEHYDRATION: ICD-10-CM

## 2022-03-10 DIAGNOSIS — G89.18 POST-OPERATIVE PAIN: Primary | ICD-10-CM

## 2022-03-10 LAB
ABSOLUTE EOS #: 0.1 K/UL (ref 0–0.4)
ABSOLUTE LYMPH #: 3.3 K/UL (ref 1–4.8)
ABSOLUTE MONO #: 0.9 K/UL (ref 0.1–1.2)
ALBUMIN SERPL-MCNC: 4.2 G/DL (ref 3.5–5.2)
ALBUMIN/GLOBULIN RATIO: 1.4 (ref 1–2.5)
ALP BLD-CCNC: 72 U/L (ref 35–104)
ALT SERPL-CCNC: 13 U/L (ref 5–33)
ANION GAP SERPL CALCULATED.3IONS-SCNC: 11 MMOL/L (ref 9–17)
AST SERPL-CCNC: 14 U/L
BASOPHILS # BLD: 0 % (ref 0–2)
BASOPHILS ABSOLUTE: 0.1 K/UL (ref 0–0.2)
BILIRUB SERPL-MCNC: 0.24 MG/DL (ref 0.3–1.2)
BILIRUBIN DIRECT: <0.08 MG/DL
BILIRUBIN URINE: NEGATIVE
BILIRUBIN, INDIRECT: ABNORMAL MG/DL (ref 0–1)
BUN BLDV-MCNC: 14 MG/DL (ref 6–20)
CALCIUM SERPL-MCNC: 9.2 MG/DL (ref 8.6–10.4)
CASE NUMBER:: NORMAL
CHLORIDE BLD-SCNC: 100 MMOL/L (ref 98–107)
CO2: 26 MMOL/L (ref 20–31)
COLOR: YELLOW
COMMENT UA: ABNORMAL
CREAT SERPL-MCNC: 0.6 MG/DL (ref 0.5–0.9)
EOSINOPHILS RELATIVE PERCENT: 0 % (ref 1–4)
GFR AFRICAN AMERICAN: >60 ML/MIN
GFR NON-AFRICAN AMERICAN: >60 ML/MIN
GFR SERPL CREATININE-BSD FRML MDRD: ABNORMAL ML/MIN/{1.73_M2}
GLUCOSE BLD-MCNC: 109 MG/DL (ref 70–99)
GLUCOSE URINE: NEGATIVE
HCG QUALITATIVE: NEGATIVE
HCT VFR BLD CALC: 37.3 % (ref 36–46)
HEMOGLOBIN: 12.5 G/DL (ref 12–16)
KETONES, URINE: ABNORMAL
LEUKOCYTE ESTERASE, URINE: NEGATIVE
LIPASE: 65 U/L (ref 13–60)
LYMPHOCYTES # BLD: 24 % (ref 24–44)
MCH RBC QN AUTO: 30.5 PG (ref 26–34)
MCHC RBC AUTO-ENTMCNC: 33.5 G/DL (ref 31–37)
MCV RBC AUTO: 91.1 FL (ref 80–100)
MONOCYTES # BLD: 7 % (ref 2–11)
NITRITE, URINE: NEGATIVE
PDW BLD-RTO: 12.7 % (ref 12.5–15.4)
PH UA: 6 (ref 5–8)
PLATELET # BLD: 459 K/UL (ref 140–450)
PMV BLD AUTO: 7.8 FL (ref 6–12)
POTASSIUM SERPL-SCNC: 3.7 MMOL/L (ref 3.7–5.3)
PROTEIN UA: NEGATIVE
RBC # BLD: 4.09 M/UL (ref 4–5.2)
SEG NEUTROPHILS: 69 % (ref 36–66)
SEGMENTED NEUTROPHILS ABSOLUTE COUNT: 9.2 K/UL (ref 1.8–7.7)
SODIUM BLD-SCNC: 137 MMOL/L (ref 135–144)
SPECIFIC GRAVITY UA: 1.01 (ref 1–1.03)
TOTAL PROTEIN: 7.3 G/DL (ref 6.4–8.3)
TURBIDITY: CLEAR
URINE HGB: NEGATIVE
UROBILINOGEN, URINE: NORMAL
WBC # BLD: 13.5 K/UL (ref 3.5–11)

## 2022-03-10 PROCEDURE — 85025 COMPLETE CBC W/AUTO DIFF WBC: CPT

## 2022-03-10 PROCEDURE — 2580000003 HC RX 258

## 2022-03-10 PROCEDURE — 3609012400 HC EGD TRANSORAL BIOPSY SINGLE/MULTIPLE: Performed by: INTERNAL MEDICINE

## 2022-03-10 PROCEDURE — 2580000003 HC RX 258: Performed by: EMERGENCY MEDICINE

## 2022-03-10 PROCEDURE — 7100000010 HC PHASE II RECOVERY - FIRST 15 MIN: Performed by: INTERNAL MEDICINE

## 2022-03-10 PROCEDURE — 74022 RADEX COMPL AQT ABD SERIES: CPT

## 2022-03-10 PROCEDURE — 88173 CYTOPATH EVAL FNA REPORT: CPT

## 2022-03-10 PROCEDURE — 88172 CYTP DX EVAL FNA 1ST EA SITE: CPT

## 2022-03-10 PROCEDURE — 36415 COLL VENOUS BLD VENIPUNCTURE: CPT

## 2022-03-10 PROCEDURE — 83690 ASSAY OF LIPASE: CPT

## 2022-03-10 PROCEDURE — 6360000002 HC RX W HCPCS: Performed by: EMERGENCY MEDICINE

## 2022-03-10 PROCEDURE — 3700000000 HC ANESTHESIA ATTENDED CARE: Performed by: INTERNAL MEDICINE

## 2022-03-10 PROCEDURE — 3700000001 HC ADD 15 MINUTES (ANESTHESIA): Performed by: INTERNAL MEDICINE

## 2022-03-10 PROCEDURE — 80076 HEPATIC FUNCTION PANEL: CPT

## 2022-03-10 PROCEDURE — 6360000002 HC RX W HCPCS

## 2022-03-10 PROCEDURE — 2720000010 HC SURG SUPPLY STERILE: Performed by: INTERNAL MEDICINE

## 2022-03-10 PROCEDURE — 76975 GI ENDOSCOPIC ULTRASOUND: CPT

## 2022-03-10 PROCEDURE — 2500000003 HC RX 250 WO HCPCS

## 2022-03-10 PROCEDURE — 6360000002 HC RX W HCPCS: Performed by: INTERNAL MEDICINE

## 2022-03-10 PROCEDURE — 84703 CHORIONIC GONADOTROPIN ASSAY: CPT

## 2022-03-10 PROCEDURE — 3609020800 HC EGD W/EUS FNA: Performed by: INTERNAL MEDICINE

## 2022-03-10 PROCEDURE — 81003 URINALYSIS AUTO W/O SCOPE: CPT

## 2022-03-10 PROCEDURE — 2709999900 HC NON-CHARGEABLE SUPPLY: Performed by: INTERNAL MEDICINE

## 2022-03-10 PROCEDURE — 7100000011 HC PHASE II RECOVERY - ADDTL 15 MIN: Performed by: INTERNAL MEDICINE

## 2022-03-10 PROCEDURE — 88305 TISSUE EXAM BY PATHOLOGIST: CPT

## 2022-03-10 PROCEDURE — 2580000003 HC RX 258: Performed by: INTERNAL MEDICINE

## 2022-03-10 PROCEDURE — 80048 BASIC METABOLIC PNL TOTAL CA: CPT

## 2022-03-10 RX ORDER — ONDANSETRON 4 MG/1
4 TABLET, ORALLY DISINTEGRATING ORAL EVERY 8 HOURS PRN
Qty: 10 TABLET | Refills: 0 | Status: SHIPPED | OUTPATIENT
Start: 2022-03-10

## 2022-03-10 RX ORDER — ONDANSETRON 2 MG/ML
4 INJECTION INTRAMUSCULAR; INTRAVENOUS ONCE
Status: COMPLETED | OUTPATIENT
Start: 2022-03-10 | End: 2022-03-10

## 2022-03-10 RX ORDER — LANOLIN ALCOHOL/MO/W.PET/CERES
3 CREAM (GRAM) TOPICAL DAILY
COMMUNITY

## 2022-03-10 RX ORDER — FENTANYL CITRATE 50 UG/ML
25 INJECTION, SOLUTION INTRAMUSCULAR; INTRAVENOUS ONCE
Status: COMPLETED | OUTPATIENT
Start: 2022-03-10 | End: 2022-03-10

## 2022-03-10 RX ORDER — 0.9 % SODIUM CHLORIDE 0.9 %
1000 INTRAVENOUS SOLUTION INTRAVENOUS ONCE
Status: COMPLETED | OUTPATIENT
Start: 2022-03-10 | End: 2022-03-11

## 2022-03-10 RX ORDER — 0.9 % SODIUM CHLORIDE 0.9 %
1000 INTRAVENOUS SOLUTION INTRAVENOUS ONCE
Status: COMPLETED | OUTPATIENT
Start: 2022-03-10 | End: 2022-03-10

## 2022-03-10 RX ORDER — LIDOCAINE HYDROCHLORIDE 10 MG/ML
INJECTION, SOLUTION INFILTRATION; PERINEURAL PRN
Status: DISCONTINUED | OUTPATIENT
Start: 2022-03-10 | End: 2022-03-10 | Stop reason: SDUPTHER

## 2022-03-10 RX ORDER — SODIUM CHLORIDE 9 MG/ML
INJECTION, SOLUTION INTRAVENOUS CONTINUOUS PRN
Status: DISCONTINUED | OUTPATIENT
Start: 2022-03-10 | End: 2022-03-10 | Stop reason: SDUPTHER

## 2022-03-10 RX ORDER — MIDAZOLAM HYDROCHLORIDE 1 MG/ML
INJECTION INTRAMUSCULAR; INTRAVENOUS PRN
Status: DISCONTINUED | OUTPATIENT
Start: 2022-03-10 | End: 2022-03-10 | Stop reason: SDUPTHER

## 2022-03-10 RX ORDER — MORPHINE SULFATE 4 MG/ML
4 INJECTION, SOLUTION INTRAMUSCULAR; INTRAVENOUS ONCE
Status: COMPLETED | OUTPATIENT
Start: 2022-03-10 | End: 2022-03-10

## 2022-03-10 RX ORDER — OXYCODONE HYDROCHLORIDE AND ACETAMINOPHEN 5; 325 MG/1; MG/1
1 TABLET ORAL EVERY 8 HOURS PRN
Qty: 5 TABLET | Refills: 0 | Status: SHIPPED | OUTPATIENT
Start: 2022-03-10 | End: 2022-03-17

## 2022-03-10 RX ORDER — SODIUM CHLORIDE 9 MG/ML
INJECTION, SOLUTION INTRAVENOUS CONTINUOUS
Status: DISCONTINUED | OUTPATIENT
Start: 2022-03-10 | End: 2022-03-10 | Stop reason: HOSPADM

## 2022-03-10 RX ORDER — PROPOFOL 10 MG/ML
INJECTION, EMULSION INTRAVENOUS CONTINUOUS PRN
Status: DISCONTINUED | OUTPATIENT
Start: 2022-03-10 | End: 2022-03-10 | Stop reason: SDUPTHER

## 2022-03-10 RX ORDER — ONDANSETRON 2 MG/ML
INJECTION INTRAMUSCULAR; INTRAVENOUS PRN
Status: DISCONTINUED | OUTPATIENT
Start: 2022-03-10 | End: 2022-03-10 | Stop reason: SDUPTHER

## 2022-03-10 RX ORDER — FLUOXETINE 10 MG/1
10 TABLET, FILM COATED ORAL DAILY
COMMUNITY

## 2022-03-10 RX ADMIN — FENTANYL CITRATE 25 MCG: 50 INJECTION INTRAMUSCULAR; INTRAVENOUS at 13:45

## 2022-03-10 RX ADMIN — ONDANSETRON 4 MG: 2 INJECTION INTRAMUSCULAR; INTRAVENOUS at 13:30

## 2022-03-10 RX ADMIN — ONDANSETRON 4 MG: 2 INJECTION INTRAMUSCULAR; INTRAVENOUS at 21:00

## 2022-03-10 RX ADMIN — SODIUM CHLORIDE: 9 INJECTION, SOLUTION INTRAVENOUS at 11:27

## 2022-03-10 RX ADMIN — SODIUM CHLORIDE 1000 ML: 9 INJECTION, SOLUTION INTRAVENOUS at 21:31

## 2022-03-10 RX ADMIN — SODIUM CHLORIDE: 9 INJECTION, SOLUTION INTRAVENOUS at 10:54

## 2022-03-10 RX ADMIN — LIDOCAINE HYDROCHLORIDE 50 MG: 10 INJECTION, SOLUTION EPIDURAL; INFILTRATION; INTRACAUDAL; PERINEURAL at 12:41

## 2022-03-10 RX ADMIN — PROPOFOL 100 MCG/KG/MIN: 10 INJECTION, EMULSION INTRAVENOUS at 12:43

## 2022-03-10 RX ADMIN — SODIUM CHLORIDE: 9 INJECTION, SOLUTION INTRAVENOUS at 12:34

## 2022-03-10 RX ADMIN — MIDAZOLAM HYDROCHLORIDE 2 MG: 1 INJECTION, SOLUTION INTRAMUSCULAR; INTRAVENOUS at 12:41

## 2022-03-10 RX ADMIN — MORPHINE SULFATE 4 MG: 4 INJECTION INTRAVENOUS at 21:38

## 2022-03-10 RX ADMIN — SODIUM CHLORIDE 1000 ML: 9 INJECTION, SOLUTION INTRAVENOUS at 23:40

## 2022-03-10 ASSESSMENT — PAIN DESCRIPTION - LOCATION
LOCATION: ABDOMEN

## 2022-03-10 ASSESSMENT — PAIN DESCRIPTION - FREQUENCY
FREQUENCY: CONTINUOUS

## 2022-03-10 ASSESSMENT — PAIN DESCRIPTION - ORIENTATION
ORIENTATION: MID
ORIENTATION: UPPER;RIGHT

## 2022-03-10 ASSESSMENT — PAIN SCALES - GENERAL
PAINLEVEL_OUTOF10: 8
PAINLEVEL_OUTOF10: 7
PAINLEVEL_OUTOF10: 9
PAINLEVEL_OUTOF10: 7
PAINLEVEL_OUTOF10: 7
PAINLEVEL_OUTOF10: 9
PAINLEVEL_OUTOF10: 8

## 2022-03-10 ASSESSMENT — PAIN DESCRIPTION - DESCRIPTORS
DESCRIPTORS: ACHING;STABBING;BURNING
DESCRIPTORS: DISCOMFORT
DESCRIPTORS: JABBING;STABBING;PRESSURE
DESCRIPTORS: PRESSURE;ACHING;SHARP
DESCRIPTORS: BURNING;ACHING

## 2022-03-10 ASSESSMENT — PAIN - FUNCTIONAL ASSESSMENT: PAIN_FUNCTIONAL_ASSESSMENT: 0-10

## 2022-03-10 ASSESSMENT — PAIN DESCRIPTION - ONSET: ONSET: SUDDEN

## 2022-03-10 NOTE — OP NOTE
EGD/EUS      Patient:   Russell Mooney    :    1980    Facility:   [facility]   Referring/PCP: Ziyad aLst MD    Procedure:   Esophagogastroduodenoscopy --diagnostic, with biopsy and Endoscopic ultrasound with FNB of pancreatic body. Date:     3/10/2022   Endoscopist:  Edi Shipley MD     Indication:   Epigastric abdominal pain of unclear etiology    Anesthesia:  MAC    Complications: None    Estimated blood loss: Minimal    Specimen collected: Yes    Description of Procedure:  Informed consent was obtained from the patient after explanation of the procedure including indications, description of the procedure,  benefits and possible risks and complications of the procedure, and alternatives. Questions were answered. The patient's history was reviewed and a directed physical examination was performed prior to the procedure. Patient was monitored throughout the procedure with pulse oximetry and periodic assessment of vital signs. Patient was sedated as noted above. With the patient in the left lateral decubitus position, the Olympus videoendoscope followed by endoechoendoscope was placed in the patient's mouth and under direct visualization passed into the esophagus. The scope was passed to the 2nd portion of the duodenum. The patient tolerated the procedure well and was taken to the recovery area in good condition. EGD Findings:   Esophagus: Two small inlet patch seen in the upper esophagus. Biopsied. Stomach: Gastritis characterized by erythema and scattered erosions seen in the antrum of the stomach. Biopsied                    Normal stomach seen during retroflexion view   Duodenum: Normal.    EUS findings:  Pancreas: Hyperechoic strands and foci seen scattered through out the pancreas. PD with hyperechoic ductal walls. PD in the head 1.7 mm, in the body 1 mm and in the tail 0.7 mm in maximum cross sectional diameter.    A round subtle hypoechoic , heterogenous, solid  mass like lesion with indistinct borders measuring 11 x 10 mm was seen in the body of the pancreas. Fine needle biopsy was performed. Color Doppler imaging was utilized prior to needle puncture to confirm a lack of significant vascular structures within the needle path. Four passes were made with the 25 gauge ultrasound biopsy needle using a transgastric approach. A stylet was used. A cytologist was present and performed a preliminary cytologic examination. Preliminary cytology is NOT suspicious for malignancy (final results are pending). Estimated blood loss was minimal. Verification of patient identification for the specimen was done by the physician and nurse using the patient's name and medical record number. CBD: Normal, no stones, sludge. CBD diameter: 4.5 mm. Gallbladder: Moderate sludge present   Left adrenal: Normal.   Ampulla: Normal.  Left lobe of liver: normal.   Lymphadenopathy: No pathologic lymphadenopathy seen in upper abdomen. COMMENT: EUS ductal and parenchymal findings suggestive of underlying mild to moderate Chronic pancreatitis                       Mass like lesion seen in the body of the pancreas . Differentials include inflammatory mass in the setting of                       Chronic pancreatitis /less likely maligant    Recommendations: Follow with the biopsy results                                     Check IGG4 levels                                     If biopsies consistent with pancreatitis then consider cholecystectomy                                     Follow up at the GI office.     Electronically signed by Giovanni Olguin MD  on 3/10/2022 at 1:25 PM

## 2022-03-10 NOTE — ANESTHESIA PRE PROCEDURE
Department of Anesthesiology  Preprocedure Note       Name:  Renata Vargas   Age:  38 y.o.  :  1980                                          MRN:  [id number]         Date:  3/10/2022      Surgeon: Felicia Monroe):  Mar Pedraza MD    Procedure: Procedure(s):  ENDOSCOPIC ULTRASOUND  (NO PATHOLOGY)    Department of Anesthesiology  Pre-Anesthesia Evaluation/Consultation         Name:  Renata Vargas                                         Age:  38 y.o.   MRN:  [id number]             Medications  Current Facility-Administered Medications   Medication Dose Route Frequency Provider Last Rate Last Admin    0.9 % sodium chloride infusion   IntraVENous Continuous Mar Pedraza MD 20 mL/hr at 03/10/22 1127 New Bag at 03/10/22 1127       Allergies   Allergen Reactions    Penicillins Other (See Comments)    Sudafed [Pseudoephedrine Hcl] Other (See Comments)     Jittery     Patient Active Problem List   Diagnosis    Iron deficiency anemia    Vitamin B12 deficiency anemia    Anxiety and depression    Pre-diabetes    Gastroesophageal reflux disease without esophagitis    Postoperative hypothyroidism    History of thyroid cancer    Abnormal mammogram of right breast    Breast pain    Fibromyalgia     Past Medical History:   Diagnosis Date    Anxiety     Asthma     Cancer (HCC)-skin     thyroid cancer    Depression     Fibromyalgia     History of anemia     has had iron infusions    Hypertension     Myofascial pain     of abdomen     Thyroid nodule     thyroid cancer     Past Surgical History:   Procedure Laterality Date    COLONOSCOPY  2022    SKIN CANCER EXCISION      THYROIDECTOMY  2021    UPPER GASTROINTESTINAL ENDOSCOPY  2022     Social History     Tobacco Use    Smoking status: Never Smoker    Smokeless tobacco: Never Used   Vaping Use    Vaping Use: Never used   Substance Use Topics    Alcohol use: Not Currently    Drug use: Not Currently     Types: Marijuana Olene Silvestre) Comment: [facility] PRN          Vital Signs (Current)   Vitals:    03/10/22 1035   BP: 136/81   Pulse: 85   Resp: 12   Temp: 96.4 °F (35.8 °C)   SpO2: 100%     Vital Signs Statistics (for past 48 hrs)     Temp  Av.4 °F (35.8 °C)  Min: 96.4 °F (35.8 °C)   Min taken time: 03/10/22 103  Max: 96.4 °F (35.8 °C)   Max taken time: 03/10/22 103  Pulse  Av  Min: 85   Min taken time: 03/10/22 103  Max: 80   Max taken time: 03/10/22 103  Resp  Av  Min: 12   Min taken time: 03/10/22 103  Max: 12   Max taken time: 03/10/22 103  BP  Min: 136/81   Min taken time: 03/10/22 103  Max: 136/81   Max taken time: 03/10/22 103  SpO2  Av %  Min: 100 %   Min taken time: 03/10/22 103  Max: 100 %   Max taken time: 03/10/22 103  BP Readings from Last 3 Encounters:   03/10/22 136/81   22 (!) 136/96   22 (!) 141/88       BMI  There is no height or weight on file to calculate BMI. CBC   Lab Results   Component Value Date    WBC 7.5 2022    RBC 4.13 2022    HGB 12.5 2022    HCT 37.5 2022    MCV 90.8 2022    RDW 13.0 2022     2022       CMP    Lab Results   Component Value Date     2022    K 3.9 2022     2022    CO2 24 2022    BUN 11 2022    CREATININE 0.64 2022    GFRAA >60 2022    LABGLOM >60 2022    GLUCOSE 113 2022    PROT 6.5 2022    CALCIUM 9.4 2022    BILITOT 0.42 2022    ALKPHOS 65 2022    AST 13 2022    ALT 15 2022       BMP    Lab Results   Component Value Date     2022    K 3.9 2022     2022    CO2 24 2022    BUN 11 2022    CREATININE 0.64 2022    CALCIUM 9.4 2022    GFRAA >60 2022    LABGLOM >60 2022    GLUCOSE 113 2022       POC Testing  No results for input(s): POCGLU, POCNA, POCK, POCCL, POCBUN, POCHEMO, POCHCT in the last 72 hours.     Coags  No results found for: PROTIME, INR, APTT    HCG (If Applicable) No results found for: PREGTESTUR, PREGSERUM, HCG, HCGQUANT     ABGs No results found for: PHART, PO2ART, EYN0XBO, LUO1NOI, BEART, Q7SQEJMZ     Type & Screen (If Applicable)  No results found for: LABABO, [address]    Radiology (If Applicable)    Cardiac Testing (If Applicable)     EKG (If Applicable) nl          Medications prior to admission:   Prior to Admission medications    Medication Sig Start Date End Date Taking? Authorizing Provider   FLUoxetine (PROZAC) 10 MG tablet Take 10 mg by mouth daily   Yes Historical Provider, MD   melatonin 3 MG TABS tablet Take 3 mg by mouth daily   Yes Historical Provider, MD   metoprolol succinate (TOPROL XL) 25 MG extended release tablet 50 mg  11/13/21  Yes Historical Provider, MD   gabapentin (NEURONTIN) 100 MG capsule Take 1 capsule by mouth 2 times daily for 5 days. Intended supply: 90 days 11/13/21 3/10/22 Yes Andi Carlos MD   tiZANidine (ZANAFLEX) 2 MG tablet TAKE 1 TABLET BY MOUTH THREE TIMES DAILY AS NEEDED 9/13/21  Yes Historical Provider, MD   MAGNESIUM GLYCINATE PO Take 400 mg by mouth daily as needed   Yes Historical Provider, MD   vitamin E 180 MG (400 UNIT) CAPS capsule Take 180 mg by mouth daily   Yes Historical Provider, MD   pantoprazole (PROTONIX) 40 MG tablet  9/13/21  Yes Historical Provider, MD   meloxicam (MOBIC) 7.5 MG tablet TAKE 1 TABLET BY MOUTH TWICE DAILY AS NEEDED FOR PAIN 7/21/21  Yes Historical Provider, MD   thyroid (ARMOUR THYROID) 90 MG tablet Take 90 mg by mouth daily   Yes Historical Provider, MD   LORazepam (ATIVAN) 0.5 MG tablet LORazepam 0.5 MG Oral Tablet  TAKE 1 TABLET DAILY AS NEEDED.    Quantity: 30 Refills: 0     Rosary Phoenix D.O.; Active   Yes Historical Provider, MD   ondansetron (ZOFRAN ODT) 4 MG disintegrating tablet Take 1 tablet by mouth every 8 hours as needed for Nausea 2/2/22   Rafa Alegria MD   pantoprazole sodium (PROTONIX) 40 MG PACK packet 1 tablet    Historical Provider, MD       Current medications:    Current Facility-Administered Medications   Medication Dose Route Frequency Provider Last Rate Last Admin    0.9 % sodium chloride infusion   IntraVENous Continuous Frank Nixon MD 20 mL/hr at 03/10/22 1127 New Bag at 03/10/22 1127       Allergies: Allergies   Allergen Reactions    Penicillins Other (See Comments)    Sudafed [Pseudoephedrine Hcl] Other (See Comments)     Jittery       Problem List:    Patient Active Problem List   Diagnosis Code    Iron deficiency anemia D50.9    Vitamin B12 deficiency anemia D51.9    Anxiety and depression F41.9, F32. A    Pre-diabetes R73.03    Gastroesophageal reflux disease without esophagitis K21.9    Postoperative hypothyroidism E89.0    History of thyroid cancer Z85.850    Abnormal mammogram of right breast R92.8    Breast pain N64.4    Fibromyalgia M79.7       Past Medical History:        Diagnosis Date    Anxiety     Asthma     Cancer (HCC)-skin     thyroid cancer    Depression     Fibromyalgia     History of anemia     has had iron infusions    Hypertension     Myofascial pain     of abdomen     Thyroid nodule     thyroid cancer       Past Surgical History:        Procedure Laterality Date    COLONOSCOPY  02/2022    SKIN CANCER EXCISION      THYROIDECTOMY  05/2021    UPPER GASTROINTESTINAL ENDOSCOPY  02/2022       Social History:    Social History     Tobacco Use    Smoking status: Never Smoker    Smokeless tobacco: Never Used   Substance Use Topics    Alcohol use: Not Currently                                Counseling given: Not Answered      Vital Signs (Current):   Vitals:    03/10/22 1035   BP: 136/81   Pulse: 85   Resp: 12   Temp: 96.4 °F (35.8 °C)   TempSrc: Infrared   SpO2: 100%                                              BP Readings from Last 3 Encounters:   03/10/22 136/81   02/11/22 (!) 136/96   02/02/22 (!) 141/88       NPO Status: Time of last liquid consumption: 0430 Time of last solid consumption: 2100                        Date of last liquid consumption: 03/10/22                        Date of last solid food consumption: 03/09/22    BMI:   Wt Readings from Last 3 Encounters:   02/11/22 180 lb (81.6 kg)   02/02/22 183 lb (83 kg)   12/31/21 185 lb (83.9 kg)     There is no height or weight on file to calculate BMI.    CBC:   Lab Results   Component Value Date    WBC 7.5 02/11/2022    RBC 4.13 02/11/2022    HGB 12.5 02/11/2022    HCT 37.5 02/11/2022    MCV 90.8 02/11/2022    RDW 13.0 02/11/2022     02/11/2022       CMP:   Lab Results   Component Value Date     02/11/2022    K 3.9 02/11/2022     02/11/2022    CO2 24 02/11/2022    BUN 11 02/11/2022    CREATININE 0.64 02/11/2022    GFRAA >60 02/11/2022    LABGLOM >60 02/11/2022    GLUCOSE 113 02/11/2022    PROT 6.5 02/11/2022    CALCIUM 9.4 02/11/2022    BILITOT 0.42 02/11/2022    ALKPHOS 65 02/11/2022    AST 13 02/11/2022    ALT 15 02/11/2022       POC Tests: No results for input(s): POCGLU, POCNA, POCK, POCCL, POCBUN, POCHEMO, POCHCT in the last 72 hours.     Coags: No results found for: PROTIME, INR, APTT    HCG (If Applicable): No results found for: PREGTESTUR, PREGSERUM, HCG, HCGQUANT     ABGs: No results found for: PHART, PO2ART, VDO7IXX, OWY6ACL, BEART, [id number]     Type & Screen (If Applicable):  No results found for: LABABO, LABRH    Drug/Infectious Status (If Applicable):  No results found for: HIV, HEPCAB    COVID-19 Screening (If Applicable): No results found for: COVID19        Anesthesia Evaluation   no history of anesthetic complications:   Airway: Mallampati: III     Neck ROM: full   Dental:          Pulmonary:   (+) recent URI: resolved,  asthma:                            Cardiovascular:  Exercise tolerance: good (>4 METS),   (+) hypertension:,                   Neuro/Psych:   (+) neuromuscular disease:, depression/anxiety    (-) seizures and CVA            ROS comment: fibro

## 2022-03-10 NOTE — PROGRESS NOTES
Very teary-eyed and appears anxious; emotional support and reassurance and education given. Verbalizes understanding.

## 2022-03-10 NOTE — PROGRESS NOTES
Pain unrelieved and is a 9 on pain scale. Dr Familia Garcia notified and spoke with patient at bedside at this time.

## 2022-03-11 VITALS
HEART RATE: 73 BPM | OXYGEN SATURATION: 100 % | TEMPERATURE: 98.3 F | HEIGHT: 63 IN | SYSTOLIC BLOOD PRESSURE: 103 MMHG | RESPIRATION RATE: 16 BRPM | WEIGHT: 183 LBS | BODY MASS INDEX: 32.43 KG/M2 | DIASTOLIC BLOOD PRESSURE: 66 MMHG

## 2022-03-11 LAB
SURGICAL PATHOLOGY REPORT: NORMAL
SURGICAL PATHOLOGY REPORT: NORMAL

## 2022-03-11 PROCEDURE — 6360000002 HC RX W HCPCS: Performed by: EMERGENCY MEDICINE

## 2022-03-11 RX ADMIN — HYDROMORPHONE HYDROCHLORIDE 1 MG: 1 INJECTION, SOLUTION INTRAMUSCULAR; INTRAVENOUS; SUBCUTANEOUS at 00:56

## 2022-03-11 ASSESSMENT — PAIN SCALES - GENERAL: PAINLEVEL_OUTOF10: 7

## 2022-03-11 NOTE — ED PROVIDER NOTES
[id number] [facility] ED  [address] [facility] [address]  Phone: [phone]  Fax: [phone]      Pt Name: Dany Valdez  M:[id number]  [place] 1980  Date of evaluation: 3/10/2022      CHIEF COMPLAINT       Chief Complaint   Patient presents with    Abdominal Pain     upper left abd pain after an invasive ultrasound today        HISTORY OF PRESENT ILLNESS   Dany Valdez is a 38 y.o. female history of fibromyalgia, chronic abdominal pain, thyroid cancer and hypothyroidism who presents for evaluation of left upper quadrant abdominal pain following an EGD with EUS. The patient reports that she had a EUS done by Dr. Blevins Last today at 12 PM at [facility] for further evaluation of left upper quadrant abdominal pain that she has had for months. The patient was found to have gastritis and had biopsies taken of her esophagus and stomach. She was also found to have a masslike lesion seen in the body of the pancreas that is likely from chronic pancreatitis but fine-needle biopsy was performed. The patient states that she woke up from surgery and had significant abdominal pain. She was treated with fentanyl and then discharged home. She states that after returning home she continued to have sharp, stabbing, nonradiating, left upper quadrant abdominal pain that improved after taking Norco.  She took a nap and woke up with continued pain which prompted her to call her GI doctor on call. She spoke with Dr. Williams Beckwith who recommended that she go to the emergency department for evaluation. The patient does not list any palliating factors. She complains of associated nausea but denies any vomiting. Her pain is worse after eating. She states that she has been passing flatus. The patient denies fever, chills, headache, vision changes, neck pain, back pain, chest pain, shortness of breath, lightheadedness, dizziness, focal weakness, numbness, tingling, recent injury or falls.     REVIEW OF SYSTEMS     Ten point review of systems was reviewed and is negative unless otherwise noted in the HPI    [address]    has a past medical history of Anxiety, Asthma, Cancer (HCC)-skin, Depression, Fibromyalgia, History of anemia, Hypertension, Myofascial pain, and Thyroid nodule. SURGICAL HISTORY      has a past surgical history that includes Skin cancer excision; Thyroidectomy (05/2021); Colonoscopy (02/2022); and Upper gastrointestinal endoscopy (02/2022). CURRENT MEDICATIONS       Discharge Medication List as of 3/10/2022 11:05 PM      CONTINUE these medications which have NOT CHANGED    Details   FLUoxetine (PROZAC) 10 MG tablet Take 10 mg by mouth dailyHistorical Med      melatonin 3 MG TABS tablet Take 3 mg by mouth dailyHistorical Med      metoprolol succinate (TOPROL XL) 25 MG extended release tablet 50 mg Historical Med      gabapentin (NEURONTIN) 100 MG capsule Take 1 capsule by mouth 2 times daily for 5 days. Intended supply: 90 days, Disp-10 capsule, R-1Normal      tiZANidine (ZANAFLEX) 2 MG tablet TAKE 1 TABLET BY MOUTH THREE TIMES DAILY AS NEEDEDHistorical Med      MAGNESIUM GLYCINATE PO Take 400 mg by mouth daily as neededHistorical Med      vitamin E 180 MG (400 UNIT) CAPS capsule Take 180 mg by mouth dailyHistorical Med      pantoprazole (PROTONIX) 40 MG tablet Historical Med      meloxicam (MOBIC) 7.5 MG tablet TAKE 1 TABLET BY MOUTH TWICE DAILY AS NEEDED FOR PAINHistorical Med      thyroid (ARMOUR THYROID) 90 MG tablet Take 90 mg by mouth dailyHistorical Med      pantoprazole sodium (PROTONIX) 40 MG PACK packet 1 tabletHistorical Med      LORazepam (ATIVAN) 0.5 MG tablet LORazepam 0.5 MG Oral Tablet  TAKE 1 TABLET DAILY AS NEEDED. Quantity: 30 Refills: 0     Jenae Duran D.O.; ActiveHistorical Med             ALLERGIES     is allergic to penicillins and sudafed [pseudoephedrine hcl]. FAMILY HISTORY     She indicated that her mother is alive.  She indicated that her father is alive.     family history includes Lung Cancer in her mother; Other in her father; Sleep Apnea in her father. SOCIAL HISTORY      reports that she has never smoked. She has never used smokeless tobacco. She reports previous alcohol use. She reports previous drug use. Drug: Marijuana Fredick Copping). PHYSICAL EXAM     INITIAL VITALS:  height is 5' 3\" (1.6 m) and weight is 83 kg (183 lb). Her oral temperature is 98.3 °F (36.8 °C). Her blood pressure is 103/66 and her pulse is 73. Her respiration is 16 and oxygen saturation is 100%. CONSTITUTIONAL: no apparent distress, well appearing  SKIN: warm, dry, no jaundice, hives or petechiae  EYES: clear conjunctiva, non-icteric sclera  HENT: normocephalic, atraumatic, moist mucus membranes  NECK: Nontender and supple with no nuchal rigidity, full range of motion  PULMONARY: clear to auscultation without wheezes, rhonchi, or rales, normal excursion, no accessory muscle use and no stridor  CARDIOVASCULAR: regular rate, rhythm. Strong radial pulses with intact distal perfusion. Capillary refill <2 seconds. GASTROINTESTINAL: soft, left upper quadrant tenderness to palpation, no pain or McBurney's point, negative Romero sign, no pulsatile mass, non-distended, no palpable masses, no rebound or guarding   GENITOURINARY: No costovertebral angle tenderness to palpation  MUSCULOSKELETAL: No midline spinal tenderness, step off or deformity. Extremities are otherwise nontender to palpation and nonerythematous. Compartments soft. No peripheral edema. NEUROLOGIC: alert and oriented x 3, GCS 15, normal mentation and speech.  Moves all extremities x 4 without motor or sensory deficit, gait is stable without ataxia  PSYCHIATRIC: normal mood and affect, thought process is clear and linear    DIAGNOSTIC RESULTS     EKG:  None    RADIOLOGY:   XR ACUTE ABD SERIES CHEST 1 VW    Result Date: 3/10/2022  EXAMINATION: TWO XRAY VIEWS OF THE ABDOMEN AND SINGLE  XRAY VIEW OF THE CHEST 3/10/2022 6:14 pm COMPARISON: 02/11/2022 HISTORY: ORDERING SYSTEM PROVIDED HISTORY: upper abdominal pain following a EUS TECHNOLOGIST PROVIDED HISTORY: upper abdominal pain following a EUS Reason for Exam: abd pain FINDINGS: Lungs are clear without acute process. No pleural effusion or pneumothorax. No focal consolidation or edema. Cardiomediastinal silhouette and bony thorax are without acute abnormality. No evidence of intraperitoneal free air. Nonspecific bowel gas pattern without evidence of obstruction. No abnormal calcifications. Osseous structures are intact. No acute process in the chest. No bowel obstruction or free air. XR ACUTE ABD SERIES CHEST 1 VW    Result Date: 2/11/2022  EXAMINATION: TWO XRAY VIEWS OF THE ABDOMEN AND SINGLE  XRAY VIEW OF THE CHEST 2/11/2022 6:51 am COMPARISON: CT abdomen pelvis from 12/17/2021, portable chest from 07/22/2021 HISTORY: ORDERING SYSTEM PROVIDED HISTORY: Epigastric abd pain, chest pain TECHNOLOGIST PROVIDED HISTORY: Epigastric abd pain, chest pain Reason for Exam: Epigastric abd pain, chest pain Relevant Medical/Surgical History: hx: thyroid cancer 28-year-old female with epigastric abdominal pain and chest pain FINDINGS: Cardiac and mediastinal contours within normal limits. No free air. No pneumothorax. No acute focal airspace consolidation or pleural effusions. No acute osseous abnormality identified. No free air below the diaphragm. Psoas shadows symmetric in appearance. Moderate stool burden. No abnormal calcifications overlying the expected locations of the bilateral renal parenchyma. Distal gas and stool project over the rectal vault. Mild bilateral hip osteoarthrosis. No abnormally dilated small bowel loops. 1. Moderate stool burden. Nonobstructive bowel-gas pattern. 2. No acute focal airspace consolidation.      MRI LUMBAR SPINE WO CONTRAST    Result Date: 3/7/2022  EXAMINATION: MRI OF THE LUMBAR SPINE WITHOUT CONTRAST, 3/7/2022 8:35 am TECHNIQUE: Multiplanar multisequence MRI of the lumbar spine was performed without the administration of intravenous contrast. COMPARISON: None. HISTORY: ORDERING SYSTEM PROVIDED HISTORY: Midline low back pain without sciatica, unspecified chronicity TECHNOLOGIST PROVIDED HISTORY: Is the patient pregnant?->No FINDINGS: BONES/ALIGNMENT: The vertebral body heights are maintained. There is age-appropriate bone marrow signal.  There is degenerative disc disease at the L4-5 level with loss of disc signal.  There is minimal disc space narrowing at L4-5. There is no spondylolisthesis. SPINAL CORD: Conus is normal in caliber and signal and terminates at the L1 level. The cauda equina is unremarkable. SOFT TISSUES: Posterior paraspinal soft tissues are unremarkable. The visualized abdominal structures are unremarkable. L1-L2: There is no significant disc herniation, spinal canal stenosis or neural foraminal narrowing. L2-L3: There is no significant disc herniation, spinal canal stenosis or neural foraminal narrowing. L3-L4: There is no significant disc herniation, spinal canal stenosis or neural foraminal narrowing. L4-L5: There is a circumferential disc bulge with facet hypertrophy. There is canal stenosis measuring 9 mm in AP dimension. There is narrowing of the lateral recesses. There is mild bilateral foraminal narrowing. L5-S1: Is a circumferential disc bulge with facet hypertrophy. There is no canal stenosis or foraminal narrowing. Multilevel degenerative change with mild canal stenosis at L4-5. Mild bilateral foraminal narrowing and lateral recess narrowing at L4-5. RECOMMENDATIONS: Unavailable     US GI ENDOSCOPIC S&I    Result Date: 3/10/2022  Radiology exam is complete. No Radiologist dictation. Please follow up with ordering provider.        LABS:  Results for orders placed or performed during the hospital encounter of 03/10/22   CBC with Auto Differential   Result Value Ref Range    WBC 13.5 (H) 3.5 - 11.0 k/uL    RBC 4.09 4.0 - 5.2 m/uL    Hemoglobin 12.5 12.0 - 16.0 g/dL    Hematocrit 37.3 36 - 46 %    MCV 91.1 80 - 100 fL    MCH 30.5 26 - 34 pg    MCHC 33.5 31 - 37 g/dL    RDW 12.7 12.5 - 15.4 %    Platelets 591 (H) 268 - 450 k/uL    MPV 7.8 6.0 - 12.0 fL    Seg Neutrophils 69 (H) 36 - 66 %    Lymphocytes 24 24 - 44 %    Monocytes 7 2 - 11 %    Eosinophils % 0 (L) 1 - 4 %    Basophils 0 0 - 2 %    Segs Absolute 9.20 (H) 1.8 - 7.7 k/uL    Absolute Lymph # 3.30 1.0 - 4.8 k/uL    Absolute Mono # 0.90 0.1 - 1.2 k/uL    Absolute Eos # 0.10 0.0 - 0.4 k/uL    Basophils Absolute 0.10 0.0 - 0.2 k/uL   Hepatic Function Panel   Result Value Ref Range    Albumin 4.2 3.5 - 5.2 g/dL    Alkaline Phosphatase 72 35 - 104 U/L    ALT 13 5 - 33 U/L    AST 14 <32 U/L    Total Bilirubin 0.24 (L) 0.3 - 1.2 mg/dL    Bilirubin, Direct <0.08 <0.31 mg/dL    Bilirubin, Indirect Can not be calculated 0.00 - 1.00 mg/dL    Total Protein 7.3 6.4 - 8.3 g/dL    Albumin/Globulin Ratio 1.4 1.0 - 2.5   Lipase   Result Value Ref Range    Lipase 65 (H) 13 - 60 U/L   Basic Metabolic Panel w/ Reflex to MG   Result Value Ref Range    Glucose 109 (H) 70 - 99 mg/dL    BUN 14 6 - 20 mg/dL    CREATININE 0.60 0.50 - 0.90 mg/dL    Calcium 9.2 8.6 - 10.4 mg/dL    Sodium 137 135 - 144 mmol/L    Potassium 3.7 3.7 - 5.3 mmol/L    Chloride 100 98 - 107 mmol/L    CO2 26 20 - 31 mmol/L    Anion Gap 11 9 - 17 mmol/L    GFR Non-African American >60 >60 mL/min    GFR African American >60 >60 mL/min    GFR Comment         HCG Qualitative, Serum   Result Value Ref Range    hCG Qual NEGATIVE NEGATIVE   Urinalysis with Reflex to Culture    Specimen: Urine   Result Value Ref Range    Color, UA Yellow Yellow    Turbidity UA Clear Clear    Glucose, Ur NEGATIVE NEGATIVE    Bilirubin Urine NEGATIVE NEGATIVE    Ketones, Urine TRACE (A) NEGATIVE    Specific Gravity, UA 1.015 1.005 - 1.030    Urine Hgb NEGATIVE NEGATIVE    pH, UA 6.0 5.0 - 8.0    Protein, UA NEGATIVE NEGATIVE    Urobilinogen, Urine Normal Normal    Nitrite, Urine NEGATIVE NEGATIVE    Leukocyte Esterase, Urine NEGATIVE NEGATIVE    Urinalysis Comments       Microscopic exam not performed based on chemical results unless requested in original order. Urinalysis Comments          Urinalysis Comments       Utilizing a urinalysis as the only screening method to exclude a potential uropathogen can be unreliable in many patient populations. Rapid screening tests are less sensitive than culture and if UTI is a clinical possibility, culture should be considered despite a negative urinalysis. EMERGENCY DEPARTMENT COURSE:        The patient was given the following medications:  Orders Placed This Encounter   Medications    0.9 % sodium chloride bolus    ondansetron (ZOFRAN) injection 4 mg    morphine injection 4 mg    0.9 % sodium chloride bolus    HYDROmorphone (DILAUDID) injection 1 mg    oxyCODONE-acetaminophen (PERCOCET) 5-325 MG per tablet     Sig: Take 1 tablet by mouth every 8 hours as needed for Pain for up to 7 days. Dispense:  5 tablet     Refill:  0    ondansetron (ZOFRAN ODT) 4 MG disintegrating tablet     Sig: Take 1 tablet by mouth every 8 hours as needed for Nausea     Dispense:  10 tablet     Refill:  0        Vitals:    Vitals:    03/10/22 2356 03/11/22 0006 03/11/22 0016 03/11/22 0026   BP: 107/66  108/67 103/66   Pulse:       Resp:       Temp:       TempSrc:       SpO2: 100% 99% 98% 100%   Weight:       Height:         -------------------------  BP: 103/66, Temp: 98.3 °F (36.8 °C), Pulse: 73, Resp: 16    CONSULTS:  None    CRITICAL CARE:   None    PROCEDURES:  None    DIAGNOSIS/ MDM:   Jarek Hernández is a 38 y.o. female who presents with acute on chronic left upper quadrant abdominal pain. She had a EGD/EUS today at 12 PM by Dr. Consuelo Sutton. Multiple biopsies were taken. She also had a fine-needle aspirate of a pancreatic mass which is likely from chronic pancreatitis.   She has mild left upper quadrant tenderness to palpation on exam.  Exam is otherwise unremarkable. Vital signs are stable. Her pain was controlled with morphine and Dilaudid. Her nausea resolved with Zofran and IV fluids. CBC shows a leukocytosis of 13.5 but is otherwise unremarkable. This may be reactive to her scope today. BMP, LFTs and urinalysis are unremarkable. Lipase is mildly elevated at 65. She declines a CT scan because she states that she has had multiple of them in the past.  She was ultimately agreeable to a x-ray acute abdominal series which shows no acute process. I suspect the patient may be having flareup of her chronic pancreatitis that was triggered by her procedure today. I have low suspicion for acute abdomen, perforation or obstruction. Instructed them to follow-up with the PCP in 1 to 2 days. She was given a prescription for Percocet 5 pills and Zofran to take as needed to help control her symptoms. She was instructed to return to the ER for worsening symptoms or any other concern. The patient understands that at this time there is no evidence for a more malignant underlying process, but also understands that early in the process of an illness or injury, an emergency department work-up can be falsely reassuring. Routine discharge counseling was given, and the patient understands that worsening, changing or persistent symptoms should prompt a immediate call or follow-up with their primary care physician or return to the emergency department. The importance of appropriate follow-up was also discussed. I have reviewed the disposition diagnosis with the patient. I have answered their questions and given discharge instructions. They voiced understanding of these instructions and did not have any further questions or complaints. FINAL IMPRESSION      1. Post-operative pain    2. Dehydration    3.  Abdominal pain, left upper quadrant          DISPOSITION/PLAN   DISPOSITION Decision To Discharge 03/10/2022 11:03:38 PM        PATIENT REFERRED TO:  Gautam López MD  [address] [facility]  [phone]    Schedule an appointment as soon as possible for a visit in 2 days      [facility] ED  [address] [place] [address] [phone]  Go to   If symptoms worsen      DISCHARGE MEDICATIONS:  Discharge Medication List as of 3/10/2022 11:05 PM      START taking these medications    Details   oxyCODONE-acetaminophen (PERCOCET) 5-325 MG per tablet Take 1 tablet by mouth every 8 hours as needed for Pain for up to 7 days. , Disp-5 tablet, R-0Normal             (Please note that portions of this note were completed with a voice recognitionprogram.  Efforts were made to edit the dictations but occasionally words are mis-transcribed.)    Morgan Mendez DO, [facility]  Emergency Physician Attending          Morgan Mendez DO  03/11/22 6425

## 2022-03-11 NOTE — ANESTHESIA POSTPROCEDURE EVALUATION
Department of Anesthesiology  Postprocedure Note    Patient: Jessica Spear  MRN: [id number]  YOB: 1980  Date of evaluation: 3/11/2022  Time:  7:31 AM     Procedure Summary     Date: 03/10/22 Room / Location: [address] [place] [address] / [address] [place] [address]    Anesthesia Start: 5165 Anesthesia Stop: 1329    Procedures:       EGD W/EUS FNA (N/A )      EGD BIOPSY Diagnosis: (EPIGASTRIC PAIN)    Surgeons: Sena Lazaro MD Responsible Provider: Almita Leyva MD    Anesthesia Type: MAC ASA Status: 2          Anesthesia Type: MAC    Crispin Phase I: Crispin Score: 10    Crispin Phase II: Crispin Score: 10    Last vitals: Reviewed and per EMR flowsheets.        Anesthesia Post Evaluation    Patient location during evaluation: bedside  Patient participation: complete - patient participated  Level of consciousness: awake and alert  Pain score: 7  Nausea & Vomiting: no vomiting  Cardiovascular status: hemodynamically stable  Respiratory status: room air  Comments: Discharged by GI

## 2022-03-12 ENCOUNTER — APPOINTMENT (OUTPATIENT)
Dept: CT IMAGING | Age: 42
End: 2022-03-12
Payer: COMMERCIAL

## 2022-03-12 ENCOUNTER — HOSPITAL ENCOUNTER (EMERGENCY)
Age: 42
Discharge: HOME | End: 2022-03-12
Attending: SPECIALIST
Payer: COMMERCIAL

## 2022-03-12 VITALS
HEART RATE: 75 BPM | OXYGEN SATURATION: 100 % | RESPIRATION RATE: 16 BRPM | SYSTOLIC BLOOD PRESSURE: 122 MMHG | DIASTOLIC BLOOD PRESSURE: 82 MMHG

## 2022-03-12 DIAGNOSIS — K85.90 ACUTE PANCREATITIS WITHOUT INFECTION OR NECROSIS, UNSPECIFIED PANCREATITIS TYPE: ICD-10-CM

## 2022-03-12 DIAGNOSIS — R10.13 ABDOMINAL PAIN, EPIGASTRIC: Primary | ICD-10-CM

## 2022-03-12 LAB
ABSOLUTE EOS #: 0.1 K/UL (ref 0–0.4)
ABSOLUTE LYMPH #: 2.4 K/UL (ref 1–4.8)
ABSOLUTE MONO #: 0.9 K/UL (ref 0.1–1.2)
ALBUMIN SERPL-MCNC: 4.1 G/DL (ref 3.5–5.2)
ALBUMIN/GLOBULIN RATIO: 1.3 (ref 1–2.5)
ALP BLD-CCNC: 77 U/L (ref 35–104)
ALT SERPL-CCNC: 12 U/L (ref 5–33)
ANION GAP SERPL CALCULATED.3IONS-SCNC: 12 MMOL/L (ref 9–17)
AST SERPL-CCNC: 11 U/L
BASOPHILS # BLD: 1 % (ref 0–2)
BASOPHILS ABSOLUTE: 0.1 K/UL (ref 0–0.2)
BILIRUB SERPL-MCNC: 0.33 MG/DL (ref 0.3–1.2)
BUN BLDV-MCNC: 10 MG/DL (ref 6–20)
CALCIUM SERPL-MCNC: 9.3 MG/DL (ref 8.6–10.4)
CHLORIDE BLD-SCNC: 100 MMOL/L (ref 98–107)
CO2: 25 MMOL/L (ref 20–31)
CREAT SERPL-MCNC: 0.57 MG/DL (ref 0.5–0.9)
EOSINOPHILS RELATIVE PERCENT: 1 % (ref 1–4)
GFR AFRICAN AMERICAN: >60 ML/MIN
GFR NON-AFRICAN AMERICAN: >60 ML/MIN
GFR SERPL CREATININE-BSD FRML MDRD: ABNORMAL ML/MIN/{1.73_M2}
GLUCOSE BLD-MCNC: 120 MG/DL (ref 70–99)
HCT VFR BLD CALC: 36.9 % (ref 36–46)
HEMOGLOBIN: 12.2 G/DL (ref 12–16)
LIPASE: 62 U/L (ref 13–60)
LYMPHOCYTES # BLD: 17 % (ref 24–44)
MCH RBC QN AUTO: 30 PG (ref 26–34)
MCHC RBC AUTO-ENTMCNC: 33.2 G/DL (ref 31–37)
MCV RBC AUTO: 90.4 FL (ref 80–100)
MONOCYTES # BLD: 7 % (ref 2–11)
PDW BLD-RTO: 12.7 % (ref 12.5–15.4)
PLATELET # BLD: 426 K/UL (ref 140–450)
PMV BLD AUTO: 7.9 FL (ref 6–12)
POTASSIUM SERPL-SCNC: 3.6 MMOL/L (ref 3.7–5.3)
RBC # BLD: 4.08 M/UL (ref 4–5.2)
SEG NEUTROPHILS: 74 % (ref 36–66)
SEGMENTED NEUTROPHILS ABSOLUTE COUNT: 10.1 K/UL (ref 1.8–7.7)
SODIUM BLD-SCNC: 137 MMOL/L (ref 135–144)
TOTAL PROTEIN: 7.3 G/DL (ref 6.4–8.3)
WBC # BLD: 13.6 K/UL (ref 3.5–11)

## 2022-03-12 PROCEDURE — 85025 COMPLETE CBC W/AUTO DIFF WBC: CPT

## 2022-03-12 PROCEDURE — 36415 COLL VENOUS BLD VENIPUNCTURE: CPT

## 2022-03-12 PROCEDURE — 83690 ASSAY OF LIPASE: CPT

## 2022-03-12 PROCEDURE — 2580000003 HC RX 258: Performed by: SPECIALIST

## 2022-03-12 PROCEDURE — 74177 CT ABD & PELVIS W/CONTRAST: CPT

## 2022-03-12 PROCEDURE — 99284 EMERGENCY DEPT VISIT MOD MDM: CPT

## 2022-03-12 PROCEDURE — 6370000000 HC RX 637 (ALT 250 FOR IP): Performed by: EMERGENCY MEDICINE

## 2022-03-12 PROCEDURE — 6360000004 HC RX CONTRAST MEDICATION: Performed by: SPECIALIST

## 2022-03-12 PROCEDURE — 80053 COMPREHEN METABOLIC PANEL: CPT

## 2022-03-12 RX ORDER — OXYCODONE HYDROCHLORIDE AND ACETAMINOPHEN 5; 325 MG/1; MG/1
1 TABLET ORAL EVERY 6 HOURS PRN
Qty: 4 TABLET | Refills: 0 | Status: SHIPPED | OUTPATIENT
Start: 2022-03-12 | End: 2022-03-15

## 2022-03-12 RX ORDER — 0.9 % SODIUM CHLORIDE 0.9 %
80 INTRAVENOUS SOLUTION INTRAVENOUS ONCE
Status: DISCONTINUED | OUTPATIENT
Start: 2022-03-12 | End: 2022-03-12 | Stop reason: HOSPADM

## 2022-03-12 RX ORDER — SODIUM CHLORIDE 0.9 % (FLUSH) 0.9 %
10 SYRINGE (ML) INJECTION PRN
Status: DISCONTINUED | OUTPATIENT
Start: 2022-03-12 | End: 2022-03-12 | Stop reason: HOSPADM

## 2022-03-12 RX ORDER — SODIUM CHLORIDE 9 MG/ML
1000 INJECTION, SOLUTION INTRAVENOUS CONTINUOUS
Status: DISCONTINUED | OUTPATIENT
Start: 2022-03-12 | End: 2022-03-12 | Stop reason: HOSPADM

## 2022-03-12 RX ORDER — LIDOCAINE HYDROCHLORIDE 20 MG/ML
5 SOLUTION OROPHARYNGEAL ONCE
Status: COMPLETED | OUTPATIENT
Start: 2022-03-12 | End: 2022-03-12

## 2022-03-12 RX ORDER — SUCRALFATE 1 G/1
1 TABLET ORAL 4 TIMES DAILY
Qty: 56 TABLET | Refills: 0 | Status: ON HOLD | OUTPATIENT
Start: 2022-03-12 | End: 2022-03-18 | Stop reason: ALTCHOICE

## 2022-03-12 RX ORDER — MAGNESIUM HYDROXIDE/ALUMINUM HYDROXICE/SIMETHICONE 120; 1200; 1200 MG/30ML; MG/30ML; MG/30ML
30 SUSPENSION ORAL ONCE
Status: COMPLETED | OUTPATIENT
Start: 2022-03-12 | End: 2022-03-12

## 2022-03-12 RX ORDER — 0.9 % SODIUM CHLORIDE 0.9 %
500 INTRAVENOUS SOLUTION INTRAVENOUS ONCE
Status: COMPLETED | OUTPATIENT
Start: 2022-03-12 | End: 2022-03-12

## 2022-03-12 RX ADMIN — Medication 80 ML: at 07:42

## 2022-03-12 RX ADMIN — ALUMINUM HYDROXIDE, MAGNESIUM HYDROXIDE, AND SIMETHICONE 30 ML: 200; 200; 20 SUSPENSION ORAL at 07:53

## 2022-03-12 RX ADMIN — IOPAMIDOL 75 ML: 755 INJECTION, SOLUTION INTRAVENOUS at 07:37

## 2022-03-12 RX ADMIN — SODIUM CHLORIDE, PRESERVATIVE FREE 10 ML: 5 INJECTION INTRAVENOUS at 07:38

## 2022-03-12 RX ADMIN — LIDOCAINE HYDROCHLORIDE 5 ML: 20 SOLUTION ORAL; TOPICAL at 07:53

## 2022-03-12 RX ADMIN — SODIUM CHLORIDE 500 ML: 9 INJECTION, SOLUTION INTRAVENOUS at 06:33

## 2022-03-12 RX ADMIN — SODIUM CHLORIDE 500 ML: 9 INJECTION, SOLUTION INTRAVENOUS at 07:54

## 2022-03-12 ASSESSMENT — PAIN SCALES - GENERAL: PAINLEVEL_OUTOF10: 7

## 2022-03-12 ASSESSMENT — PAIN - FUNCTIONAL ASSESSMENT: PAIN_FUNCTIONAL_ASSESSMENT: 0-10

## 2022-03-12 NOTE — ED NOTES
Patient arrived to ER with complaints of LUQ that intermittently radiates to the RUQ. States nausea but no vomiting or diarrhea or any additional complaints. States was here on Thursday with same complaints but has had worsening symptoms since with no relief in pain from pain medication of Percocet that she was discharged with. Patient states increasing pain from previous visit at ER.       Lona Blevins RN  03/12/22 9281

## 2022-03-12 NOTE — ED PROVIDER NOTES
[id number] [facility] ED  [address] [address] [facility] [address] [facility] [address]  Phone: [phone]  Fax: [phone]        ADDENDUM:      Care of this patient was assumed from Dr. Jared Coppola. The patient was seen for Abdominal Pain (LUQ pain)  . The patient's initial evaluation and plan have been discussed with the prior provider who initially evaluated the patient. Nursing Notes, Past Medical Hx, Past Surgical Hx, Allergies, were all reviewed. PAST MEDICAL HISTORY    has a past medical history of Anxiety, Asthma, Cancer (HCC)-skin, Depression, Fibromyalgia, History of anemia, Hypertension, Myofascial pain, and Thyroid nodule. SURGICAL HISTORY      has a past surgical history that includes Skin cancer excision; Thyroidectomy (05/2021); Colonoscopy (02/2022); Upper gastrointestinal endoscopy (02/2022); Upper gastrointestinal endoscopy (N/A, 3/10/2022); and Upper gastrointestinal endoscopy (3/10/2022). CURRENT MEDICATIONS       Previous Medications    FLUOXETINE (PROZAC) 10 MG TABLET    Take 10 mg by mouth daily    GABAPENTIN (NEURONTIN) 100 MG CAPSULE    Take 1 capsule by mouth 2 times daily for 5 days. Intended supply: 90 days    LORAZEPAM (ATIVAN) 0.5 MG TABLET    LORazepam 0.5 MG Oral Tablet  TAKE 1 TABLET DAILY AS NEEDED. Quantity: 30 Refills: 0     Houston Meuse D.O.; Active    MAGNESIUM GLYCINATE PO    Take 400 mg by mouth daily as needed    MELATONIN 3 MG TABS TABLET    Take 3 mg by mouth daily    MELOXICAM (MOBIC) 7.5 MG TABLET    TAKE 1 TABLET BY MOUTH TWICE DAILY AS NEEDED FOR PAIN    METOPROLOL SUCCINATE (TOPROL XL) 25 MG EXTENDED RELEASE TABLET    50 mg     ONDANSETRON (ZOFRAN ODT) 4 MG DISINTEGRATING TABLET    Take 1 tablet by mouth every 8 hours as needed for Nausea    OXYCODONE-ACETAMINOPHEN (PERCOCET) 5-325 MG PER TABLET    Take 1 tablet by mouth every 8 hours as needed for Pain for up to 7 days.     PANTOPRAZOLE (PROTONIX) 40 MG TABLET        PANTOPRAZOLE SODIUM (PROTONIX) 40 MG PACK PACKET    1 tablet    THYROID (ARMOUR THYROID) 90 MG TABLET    Take 90 mg by mouth daily    TIZANIDINE (ZANAFLEX) 2 MG TABLET    TAKE 1 TABLET BY MOUTH THREE TIMES DAILY AS NEEDED    VITAMIN E 180 MG (400 UNIT) CAPS CAPSULE    Take 180 mg by mouth daily       ALLERGIES     is allergic to penicillins and sudafed [pseudoephedrine hcl].       Diagnostic Results         LABS:   Results for orders placed or performed during the hospital encounter of 03/12/22   CBC with Auto Differential   Result Value Ref Range    WBC 13.6 (H) 3.5 - 11.0 k/uL    RBC 4.08 4.0 - 5.2 m/uL    Hemoglobin 12.2 12.0 - 16.0 g/dL    Hematocrit 36.9 36 - 46 %    MCV 90.4 80 - 100 fL    MCH 30.0 26 - 34 pg    MCHC 33.2 31 - 37 g/dL    RDW 12.7 12.5 - 15.4 %    Platelets 048 083 - 450 k/uL    MPV 7.9 6.0 - 12.0 fL    Seg Neutrophils 74 (H) 36 - 66 %    Lymphocytes 17 (L) 24 - 44 %    Monocytes 7 2 - 11 %    Eosinophils % 1 1 - 4 %    Basophils 1 0 - 2 %    Segs Absolute 10.10 (H) 1.8 - 7.7 k/uL    Absolute Lymph # 2.40 1.0 - 4.8 k/uL    Absolute Mono # 0.90 0.1 - 1.2 k/uL    Absolute Eos # 0.10 0.0 - 0.4 k/uL    Basophils Absolute 0.10 0.0 - 0.2 k/uL   Comprehensive Metabolic Panel w/ Reflex to MG   Result Value Ref Range    Glucose 120 (H) 70 - 99 mg/dL    BUN 10 6 - 20 mg/dL    CREATININE 0.57 0.50 - 0.90 mg/dL    Calcium 9.3 8.6 - 10.4 mg/dL    Sodium 137 135 - 144 mmol/L    Potassium 3.6 (L) 3.7 - 5.3 mmol/L    Chloride 100 98 - 107 mmol/L    CO2 25 20 - 31 mmol/L    Anion Gap 12 9 - 17 mmol/L    Alkaline Phosphatase 77 35 - 104 U/L    ALT 12 5 - 33 U/L    AST 11 <32 U/L    Total Bilirubin 0.33 0.3 - 1.2 mg/dL    Total Protein 7.3 6.4 - 8.3 g/dL    Albumin 4.1 3.5 - 5.2 g/dL    Albumin/Globulin Ratio 1.3 1.0 - 2.5    GFR Non-African American >60 >60 mL/min    GFR African American >60 >60 mL/min    GFR Comment         Lipase   Result Value Ref Range    Lipase 62 (H) 13 - 60 U/L       RADIOLOGY:  CT ABDOMEN PELVIS W IV CONTRAST Additional Contrast? None   Preliminary Result   1. Slight enlargement of the tail of the pancreas with peripancreatic   haziness suspicious for low-grade pancreatitis. 2.  Dilated gallbladder without gallstones. 3.  No gallstones, small-bowel obstruction, urinary obstruction or   appendicitis. RECOMMENDATIONS:   Consider correlation with serum lipase level. RECENT VITALS:  BP: (!) 151/103,  , Pulse: 88, Resp: 16     ED Course     The patient was given the following medications:  Orders Placed This Encounter   Medications    0.9 % sodium chloride bolus    0.9 % sodium chloride infusion    iopamidol (ISOVUE-370) 76 % injection 75 mL    sodium chloride flush 0.9 % injection 10 mL    0.9 % sodium chloride bolus    aluminum & magnesium hydroxide-simethicone (MAALOX) 200-200-20 MG/5ML suspension 30 mL    lidocaine viscous hcl (XYLOCAINE) 2 % solution 5 mL    sucralfate (CARAFATE) 1 GM tablet     Sig: Take 1 tablet by mouth 4 times daily for 14 days     Dispense:  56 tablet     Refill:  0       Medical Decision Making      ED Course as of 03/12/22 0819   Sat Mar 12, 2022   0717 MCV: 90.4 [TS]   0804 Patient signed out to me pending results of CT scan. Her white blood cell count is minimally elevated, lipase minimally elevated. No significant changes when compared with 2 days ago. CT scan revealed mild haziness around the tail of the pancreas. It is certainly possible she has mild pancreatitis, however, at this time no vomiting, no fever, pain controlled, I do think discharge home with modified diet, pain medication and rapid follow-up would be appropriate. She did just see her GI doctor, she states she is not exactly sure when her follow-up is. She is doing tea and toast at home, she had a protein shake and I recommended she avoid dairy in the short-term. She is seeking a surgeon to take out her gallbladder.   Overall, this does appear to be somewhat chronic and I do think the recent procedure and biopsies have likely inflamed her upper GI system. She is on Protonix, will discharge on Carafate and I recommended rapid follow-up with PCP/GI    At this time the patient is without objective evidence of an acute process requiring hospitalization or inpatient management. They have remained hemodynamically stable and are stable for discharge with outpatient follow-up. Standard anticipatory guidance given to patient upon discharge. Have given them a specific time frame in which to follow-up and who to follow-up with. I have also advised them that they should return to the emergency department if they get worse, or not getting better or develop any new or concerning symptoms. Patient demonstrates understanding. [TS]      ED Course User Index  [TS] Alyssa Shea DO         EMERGENCY DEPARTMENT COURSE:   Vitals:    Vitals:    03/12/22 0609   BP: (!) 151/103   Pulse: 88   Resp: 16   SpO2: 97%     -------------------------  BP: (!) 151/103,  , Pulse: 88, Resp: 16      RE-EVALUATION:  Stable    CONSULTS:  None    PROCEDURES:  None    FINAL IMPRESSION      1. Abdominal pain, epigastric    2.  Acute pancreatitis without infection or necrosis, unspecified pancreatitis type          DISPOSITION/PLAN   DISPOSITION Decision To Discharge 03/12/2022 08:17:50 AM      CONDITION ON DISPOSITION:   Stable    PATIENT REFERRED TO:  Zbigniew Manzanares MD  [facility] [phone]    In 3 days      Your GI Doctor    In 3 days        DISCHARGE MEDICATIONS:  New Prescriptions    SUCRALFATE (CARAFATE) 1 GM TABLET    Take 1 tablet by mouth 4 times daily for 14 days       (Please note that portions of this note were completed with a voicerecognition program.  Efforts were made to edit the dictations but occasionally words are mis-transcribed.)    Alyssa Shea DO  Attending Emergency Medicine Physician                      Alyssa Shea DO  03/12/22 2471

## 2022-03-12 NOTE — ED PROVIDER NOTES
[address]      Pt Name: Deandre Oden  MRN: [id number]  [place] 1980  Date of evaluation: 3/12/22      CHIEF COMPLAINT       Chief Complaint   Patient presents with    Abdominal Pain     LUQ pain         HISTORY OF PRESENT ILLNESS    Deandre Oden is a 38 y.o. female who presents to the emergency department for evaluation of left upper quadrant abdominal pain radiating towards the left lower quadrant since last 2 days. Patient underwent endoscopic ultrasound and EGD 2 days ago and the pain has gotten worse since then. Patient states she has underlying burning and dull aching pain since October 2021 but it has become sharp intermittently since the procedure. She grades pain as 7 out of 10 in intensity. She has associated nausea but denies any vomiting. She is unsure about constipation as last bowel movement was 3 days ago which could be from decreased appetite. She had some chills but denies any fever. She denies any urinary frequency, urgency, dysuria or hematuria. Abdominal pain radiates to the back but denies any radiation to the chest and patient denies any cough, shortness of breath, chest pain, palpitations or diaphoresis. Patient has not had any abdominal surgeries in the past.  Patient was seen in our emergency department 2 days ago when she was found to have leukocytosis but rest of the work-up was unremarkable. She preferred not to have a CAT scan at that time so as to avoid radiation exposure but pain has not been improving and that she returns back to the emergency department and is requesting CAT scan of the abdomen and pelvis today. REVIEW OF SYSTEMS       Review of Systems    All systems reviewed and negative unless noted in HPI. The patient denies fever or constitutional symptoms. Denies vision change. Denies any sore throat or rhinorrhea. Denies any neck pain or stiffness. Denies chest pain or shortness of breath. Presents with left upper quadrant abdominal pain associated with nausea   Denies any dysuria. Denies urinary frequency or hematuria. Denies musculoskeletal injury or pain. Denies any weakness, numbness or focal neurologic deficit. Denies any skin rash or edema. No recent psychiatric issues. Denies any polyuria, polydypsia       PAST MEDICAL HISTORY    has a past medical history of Anxiety, Asthma, Cancer (HCC)-skin, Depression, Fibromyalgia, History of anemia, Hypertension, Myofascial pain, and Thyroid nodule. SURGICAL HISTORY      has a past surgical history that includes Skin cancer excision; Thyroidectomy (05/2021); Colonoscopy (02/2022); Upper gastrointestinal endoscopy (02/2022); Upper gastrointestinal endoscopy (N/A, 3/10/2022); and Upper gastrointestinal endoscopy (3/10/2022). CURRENT MEDICATIONS       Previous Medications    FLUOXETINE (PROZAC) 10 MG TABLET    Take 10 mg by mouth daily    GABAPENTIN (NEURONTIN) 100 MG CAPSULE    Take 1 capsule by mouth 2 times daily for 5 days. Intended supply: 90 days    LORAZEPAM (ATIVAN) 0.5 MG TABLET    LORazepam 0.5 MG Oral Tablet  TAKE 1 TABLET DAILY AS NEEDED. Quantity: 30 Refills: 0     Donold Room D.O.; Active    MAGNESIUM GLYCINATE PO    Take 400 mg by mouth daily as needed    MELATONIN 3 MG TABS TABLET    Take 3 mg by mouth daily    MELOXICAM (MOBIC) 7.5 MG TABLET    TAKE 1 TABLET BY MOUTH TWICE DAILY AS NEEDED FOR PAIN    METOPROLOL SUCCINATE (TOPROL XL) 25 MG EXTENDED RELEASE TABLET    50 mg     ONDANSETRON (ZOFRAN ODT) 4 MG DISINTEGRATING TABLET    Take 1 tablet by mouth every 8 hours as needed for Nausea    OXYCODONE-ACETAMINOPHEN (PERCOCET) 5-325 MG PER TABLET    Take 1 tablet by mouth every 8 hours as needed for Pain for up to 7 days.     PANTOPRAZOLE (PROTONIX) 40 MG TABLET        PANTOPRAZOLE SODIUM (PROTONIX) 40 MG PACK PACKET    1 tablet    THYROID (ARMOUR THYROID) 90 MG TABLET    Take 90 mg by mouth daily    TIZANIDINE (ZANAFLEX) 2 MG TABLET    TAKE 1 TABLET BY MOUTH THREE TIMES DAILY AS NEEDED    VITAMIN E 180 MG (400 UNIT) CAPS CAPSULE    Take 180 mg by mouth daily       ALLERGIES     is allergic to penicillins and sudafed [pseudoephedrine hcl]. FAMILY HISTORY     She indicated that her mother is alive. She indicated that her father is alive. family history includes Lung Cancer in her mother; Other in her father; Sleep Apnea in her father. SOCIAL HISTORY      reports that she has never smoked. She has never used smokeless tobacco. She reports previous alcohol use. She reports previous drug use. Drug: Marijuana Westly Cliche). PHYSICAL EXAM     INITIAL VITALS:  blood pressure is 151/103 (abnormal) and her pulse is 88. Her respiration is 16 and oxygen saturation is 97%. Physical Exam  Vitals and nursing note reviewed. Constitutional:       Appearance: She is well-developed. HENT:      Head: Normocephalic and atraumatic. Nose: Nose normal.   Eyes:      Extraocular Movements: Extraocular movements intact. Pupils: Pupils are equal, round, and reactive to light. Cardiovascular:      Rate and Rhythm: Normal rate and regular rhythm. Heart sounds: Normal heart sounds. No murmur heard. Pulmonary:      Effort: Pulmonary effort is normal. No respiratory distress. Breath sounds: Normal breath sounds. Abdominal:      General: Bowel sounds are normal. There is no distension or abdominal bruit. Palpations: Abdomen is soft. There is no pulsatile mass. Tenderness: There is abdominal tenderness in the left upper quadrant. There is no right CVA tenderness, left CVA tenderness, guarding or rebound. Negative signs include Romero's sign and McBurney's sign. Musculoskeletal:      Cervical back: Normal range of motion and neck supple. Skin:     General: Skin is warm and dry. Neurological:      General: No focal deficit present.       Mental Status: She is alert and oriented to person, place, and time.           DIFFERENTIAL DIAGNOSIS/ MDM:     Cholecystitis, appendicitis, pancreatitis,  urinary tract infection, renal stone, small bowel obstruction,diverticulitis, gastritis, enteritis, colitis. DIAGNOSTIC RESULTS     EKG: All EKG's are interpreted by the Emergency Department Physician who either signs or Co-signs this chart in the absence of a cardiologist.    None obtained    RADIOLOGY:   Interpretation per the Radiologist below, if available at the time of this note:    XR ACUTE ABD SERIES CHEST 1 VW    Result Date: 3/10/2022  EXAMINATION: TWO XRAY VIEWS OF [place] [address] 3/10/2022 6:14 pm COMPARISON: 02/11/2022 HISTORY: ORDERING SYSTEM PROVIDED HISTORY: upper abdominal pain following a EUS TECHNOLOGIST PROVIDED HISTORY: upper abdominal pain following a EUS Reason for Exam: abd pain FINDINGS: Lungs are clear without acute process. No pleural effusion or pneumothorax. No focal consolidation or edema. Cardiomediastinal silhouette and bony thorax are without acute abnormality. No evidence of intraperitoneal free air. Nonspecific bowel gas pattern without evidence of obstruction. No abnormal calcifications. Osseous structures are intact. No acute process in the chest. No bowel obstruction or free air. US GI ENDOSCOPIC S&I    Result Date: 3/10/2022  Radiology exam is complete. No Radiologist dictation. Please follow up with ordering provider. LABS:  No results found for this visit on 03/12/22. CBC, CMP and lipase are pending    EMERGENCY DEPARTMENT COURSE:   Vitals:    Vitals:    03/12/22 0609   BP: (!) 151/103   Pulse: 88   Resp: 16   SpO2: 97%     -------------------------  BP: (!) 151/103,  , Pulse: 88, Resp: 16    Orders Placed This Encounter   Medications    0.9 % sodium chloride bolus    0.9 % sodium chloride infusion         During the emergency department course, patient was given normal saline bolus followed by infusion.   Her lab work and CAT scan results are pending and case will be turned over to Dr. Ct Smith at the end of my shift at 7 AM for further care and disposition. PROCEDURES:  None    FINAL IMPRESSION    No diagnosis found. DISPOSITION/PLAN       PATIENT REFERRED TO:  No follow-up provider specified. DISCHARGE MEDICATIONS:  New Prescriptions    No medications on file       (Please note that portions of this note were completed with a voice recognition program.  Efforts were made to edit the dictations but occasionally words are mis-transcribed.)    Chuckie Blankenship MD,, MD, F.A.C.E.P.   Attending Emergency Medicine Physician      Chuckie Blankenship MD  03/12/22 8499

## 2022-03-13 ENCOUNTER — NURSE TRIAGE (OUTPATIENT)
Dept: OTHER | Facility: CLINIC | Age: 42
End: 2022-03-13

## 2022-03-13 NOTE — ED NOTES
Per pt/ [facility] request- faxed labs/imaging results to [phone]- unable to reach med records on Sunday- pt at [facility] currently     Angie Peres RN  03/13/22 4776

## 2022-03-13 NOTE — TELEPHONE ENCOUNTER
Subjective: Caller states last Thursday had endoscopic usd with biopsy of pancreas. Has been seen in ER twice since then, last time was yesterday,diagnosed with pancreatitis. Continues to have abd pain. States she spoke to her GI Dr. Manisha Bender after leaving ER and was advised to return to ER for worsening symptoms. Current Symptoms:  Unable to eat, drinking is painful. Since home from ER yesterday at 930a.m. Has drank 20 ounces of fluid. Feels very tired and weak This morning not urinating much, started forcing water, voided 2 times recently. Last BM was Weds. Pain is in LUQ abd and is continuous. Onset: Last Thursday. 3/10/22    Associated Symptoms: NA    Pain Severity: 4/10, 7/10 if drinks. Upper left abd    Temperature: Denies    What has been tried: Percocet    LMP: 3/2/22 Pregnant: No    Recommended disposition: Go to ED Now    Care advice provided, patient verbalizes understanding; denies any other questions or concerns; instructed to call back for any new or worsening symptoms. Patient/caller agrees to proceed to the Emergency Department    This triage is a result of a call to [address] [place] [address]. Please do not respond to the triage nurse through this encounter. Any subsequent communication should be directly with the patient.     Reason for Disposition   Constant abdominal pain lasting > 2 hours    Protocols used: ABDOMINAL PAIN - UPPER-ADULT-OH

## 2022-03-14 ENCOUNTER — OFFICE VISIT (OUTPATIENT)
Dept: SURGERY | Age: 42
End: 2022-03-14
Payer: COMMERCIAL

## 2022-03-14 VITALS — WEIGHT: 183 LBS | RESPIRATION RATE: 12 BRPM | HEIGHT: 63 IN | BODY MASS INDEX: 32.43 KG/M2

## 2022-03-14 DIAGNOSIS — K83.8 BILIARY SLUDGE: Primary | ICD-10-CM

## 2022-03-14 DIAGNOSIS — K80.20 SYMPTOMATIC CHOLELITHIASIS: ICD-10-CM

## 2022-03-14 PROCEDURE — G8484 FLU IMMUNIZE NO ADMIN: HCPCS | Performed by: SURGERY

## 2022-03-14 PROCEDURE — 99204 OFFICE O/P NEW MOD 45 MIN: CPT | Performed by: SURGERY

## 2022-03-14 PROCEDURE — 1036F TOBACCO NON-USER: CPT | Performed by: SURGERY

## 2022-03-14 PROCEDURE — G8417 CALC BMI ABV UP PARAM F/U: HCPCS | Performed by: SURGERY

## 2022-03-14 PROCEDURE — G8427 DOCREV CUR MEDS BY ELIG CLIN: HCPCS | Performed by: SURGERY

## 2022-03-15 ENCOUNTER — TELEPHONE (OUTPATIENT)
Dept: SURGERY | Age: 42
End: 2022-03-15

## 2022-03-15 NOTE — TELEPHONE ENCOUNTER
3/15/2022- Spoke to patient, surgery schedule at [facility]. Patient confirmed and information mailed to patient.     Surgery date/time: 4/1/2022 at 12:00pm  Arrival time: 10:00am  Pre admission testing: phone call  Regine Gamino op appt: 4/12/2022 at 9:40am  *vaccinated

## 2022-03-15 NOTE — PROGRESS NOTES
[id number] [facility] Surgery   History & Physical  Traci Perez DO  Pt Name: Senthil Cardona  MRN: [id number]  YOB: 1980  Date of evaluation: 3/14/2022  Primary Care Physician: Darnell Gray MD    Chief Complaint: biliary sludge, symptomatic cholelithiasis      SUBJECTIVE:    History of Present Illness: This is a 38 y.o.  female who presents for evaluation for the above, reports a long intermittent history of abdominal pain that often starts as epigastric/LUQ but has also been experiencing migration/radiation of this pain to the RUQ. Also complains of associated mid-back pain, other reports of nonspecific indigestion. Pt reports previous GBUS/HIDA in [facility] but those records are not available. Pt had RUQ ultrasound 10/2021 and was found to have normal gallbladder. Pt was evaluated by GI service (Dr. Francisco Judd), workup included EGD/EUS and fine needle biopsy of the pancreatic tail due to a \"mass like lesion\" seen on EUS. Biopsy results as follows:    Mild chronic gastritis, negative H pylori  Benign pancreatic tissue with some features of pancreatitis    EUS was significant for \"moderate sludge\" in the gallbladder. Pt reports more frequent epigastric/LUQ pain since her EUS/FNB, she was evaluated in the [facility] ED including repeat CT abd/pelv which showed mild pancreatitis, lipase was 65-62. Pt has been able to tolerate clear liquid diet since her EUS. Chart review performed to add information to the HPI: Yes    Past Medical History   has a past medical history of Anxiety, Asthma, Cancer (HCC)-skin, Depression, Fibromyalgia, History of anemia, Hypertension, Myofascial pain, and Thyroid nodule. Past Surgical History   has a past surgical history that includes Skin cancer excision; Thyroidectomy (05/2021); Colonoscopy (02/2022); Upper gastrointestinal endoscopy (02/2022); Upper gastrointestinal endoscopy (N/A, 3/10/2022); and Upper gastrointestinal endoscopy (3/10/2022).     Family History  family history includes Lung Cancer in her mother; Other in her father; Sleep Apnea in her father. Social History  Tobacco use:  reports that she has never smoked. She has never used smokeless tobacco.  Alcohol use:  reports previous alcohol use. Drug use:  reports previous drug use. Drug: Marijuana Bk Nieves). Medications  Current Medications:   Current Outpatient Medications   Medication Sig Dispense Refill    sucralfate (CARAFATE) 1 GM tablet Take 1 tablet by mouth 4 times daily for 14 days 56 tablet 0    oxyCODONE-acetaminophen (PERCOCET) 5-325 MG per tablet Take 1 tablet by mouth every 6 hours as needed for Pain for up to 3 days. Intended supply: 3 days. Take lowest dose possible to manage pain 4 tablet 0    FLUoxetine (PROZAC) 10 MG tablet Take 10 mg by mouth daily      melatonin 3 MG TABS tablet Take 3 mg by mouth daily      oxyCODONE-acetaminophen (PERCOCET) 5-325 MG per tablet Take 1 tablet by mouth every 8 hours as needed for Pain for up to 7 days. 5 tablet 0    ondansetron (ZOFRAN ODT) 4 MG disintegrating tablet Take 1 tablet by mouth every 8 hours as needed for Nausea 10 tablet 0    metoprolol succinate (TOPROL XL) 25 MG extended release tablet 50 mg       gabapentin (NEURONTIN) 100 MG capsule Take 1 capsule by mouth 2 times daily for 5 days. Intended supply: 90 days 10 capsule 1    tiZANidine (ZANAFLEX) 2 MG tablet TAKE 1 TABLET BY MOUTH THREE TIMES DAILY AS NEEDED      MAGNESIUM GLYCINATE PO Take 400 mg by mouth daily as needed      vitamin E 180 MG (400 UNIT) CAPS capsule Take 180 mg by mouth daily      pantoprazole (PROTONIX) 40 MG tablet       meloxicam (MOBIC) 7.5 MG tablet TAKE 1 TABLET BY MOUTH TWICE DAILY AS NEEDED FOR PAIN      thyroid (ARMOUR THYROID) 90 MG tablet Take 90 mg by mouth daily      pantoprazole sodium (PROTONIX) 40 MG PACK packet 1 tablet      LORazepam (ATIVAN) 0.5 MG tablet LORazepam 0.5 MG Oral Tablet  TAKE 1 TABLET DAILY AS NEEDED.    Quantity: 30 Refills: 0     Willma Amen D.O.; Active       No current facility-administered medications for this visit. Home Medications:   Prior to Admission medications    Medication Sig Start Date End Date Taking? Authorizing Provider   sucralfate (CARAFATE) 1 GM tablet Take 1 tablet by mouth 4 times daily for 14 days 3/12/22 3/26/22  Ankur Raymond, DO   oxyCODONE-acetaminophen (PERCOCET) 5-325 MG per tablet Take 1 tablet by mouth every 6 hours as needed for Pain for up to 3 days. Intended supply: 3 days. Take lowest dose possible to manage pain 3/12/22 3/15/22  Ankur Raymond, DO   FLUoxetine (PROZAC) 10 MG tablet Take 10 mg by mouth daily    Historical Provider, MD   melatonin 3 MG TABS tablet Take 3 mg by mouth daily    Historical Provider, MD   oxyCODONE-acetaminophen (PERCOCET) 5-325 MG per tablet Take 1 tablet by mouth every 8 hours as needed for Pain for up to 7 days. 3/10/22 3/17/22  Suzanne Soto DO   ondansetron (ZOFRAN ODT) 4 MG disintegrating tablet Take 1 tablet by mouth every 8 hours as needed for Nausea 3/10/22   Suzanne Soto DO   metoprolol succinate (TOPROL XL) 25 MG extended release tablet 50 mg  11/13/21   Historical Provider, MD   gabapentin (NEURONTIN) 100 MG capsule Take 1 capsule by mouth 2 times daily for 5 days.  Intended supply: 90 days 11/13/21 3/10/22  Cary Magaña MD   tiZANidine (ZANAFLEX) 2 MG tablet TAKE 1 TABLET BY MOUTH THREE TIMES DAILY AS NEEDED 9/13/21   Historical Provider, MD   MAGNESIUM GLYCINATE PO Take 400 mg by mouth daily as needed    Historical Provider, MD   vitamin E 180 MG (400 UNIT) CAPS capsule Take 180 mg by mouth daily    Historical Provider, MD   pantoprazole (PROTONIX) 40 MG tablet  9/13/21   Historical Provider, MD   meloxicam (MOBIC) 7.5 MG tablet TAKE 1 TABLET BY MOUTH TWICE DAILY AS NEEDED FOR PAIN 7/21/21   Historical Provider, MD   thyroid (ARMOUR THYROID) 90 MG tablet Take 90 mg by mouth daily    Historical Provider, MD   pantoprazole sodium (PROTONIX) 40 MG PACK packet 1 tablet    Historical Provider, MD   LORazepam (ATIVAN) 0.5 MG tablet LORazepam 0.5 MG Oral Tablet  TAKE 1 TABLET DAILY AS NEEDED. Quantity: 30 Refills: 0     Emaline Brain D.O.; Active    Historical Provider, MD       Allergies  Penicillins and Sudafed [pseudoephedrine hcl]      Review of Systems:  General: Denies any fever, chills. Eyes: Denies any changes in vision, diplopia or eye pain  Ears, Nose, Mouth: Denies changes in hearing/tinnitus or drainage from ears, no rhinorrhea or bloody nose, no difficulty chewing  Throat: no difficulty swallowing, no throat pain  Respiratory: Denies any shortness of breath or cough. Cardiac: Denies any chest pain, palpitations, claudication or edema. Gastrointestinal: history indigestion, epigastric/LUQ pain with occasional RUQ pain  Genitourinary: Denies any frequency, urgency, hesitancy or incontinence. Musculoskeletal: Denies worsening muscle weakness or recent trauma  Skin: Denies rashes or lesions  Psychiatric: Denies any recent changes in mood or affect  Hematologic: Denies bruising or bleeding easily. PHYSICAL EXAMINATION  Vitals:   Vitals:    03/14/22 1442   Resp: 12       General Appearance:  awake, alert, no acute distress, well developed, well nourished   Skin:  Skin color, texture, turgor normal. No rashes or lesions. Head/face:  NCAT, face symmetrical  Eyes:  PERRL, no evidence of conjunctivitis or ptosis bilaterally  Ears:  External ears and canals grossly normal, no evidence of otorrhea. Nose/Sinuses:  Nares normal. Septum midline. Mucosa normal. No external drainage noted. Mouth/Neck:  Mucosa moist.  No external oral lesions. Trachea midline. No visible masses. Lungs:  Normal chest expansion, unlabored breathing without accessory muscle use. No audible rales, rhonchi, or wheezing. Cardiovascular: S1S2. No evidence of JVD.  No evidence of pulsatile masses in abdomen  Abdomen:  Deferred today due to pt discomfort  Musculoskeletal: No evidence of bony/muscular deformities, trauma, atrophy of either left/right upper/lower extremity. No evidence of digital clubbing or cyanosis. Neurologic:  CN 2-12 grossly intact without obvious deficits. Grossly normal sensation in all extremities. Psychiatric: appropriate judgement and insight, appropriate recall of recent and remote memory, no evidence of depression/anxiety/agitation  RADIOLOGY:  The following images and/or reports were personally reviewed with the following significant findings pertinent to the Chief Complaint and/or HPI:     XR ACUTE ABD SERIES CHEST 1 VW    Result Date: 3/10/2022  EXAMINATION: TWO XRAY VIEWS OF THE ABDOMEN AND SINGLE  XRAY VIEW OF THE CHEST 3/10/2022 6:14 pm COMPARISON: 02/11/2022 HISTORY: ORDERING SYSTEM PROVIDED HISTORY: upper abdominal pain following a EUS TECHNOLOGIST PROVIDED HISTORY: upper abdominal pain following a EUS Reason for Exam: abd pain FINDINGS: Lungs are clear without acute process. No pleural effusion or pneumothorax. No focal consolidation or edema. Cardiomediastinal silhouette and bony thorax are without acute abnormality. No evidence of intraperitoneal free air. Nonspecific bowel gas pattern without evidence of obstruction. No abnormal calcifications. Osseous structures are intact. No acute process in the chest. No bowel obstruction or free air. MRI LUMBAR SPINE WO CONTRAST    Result Date: 3/7/2022  EXAMINATION: MRI OF THE LUMBAR SPINE WITHOUT CONTRAST, 3/7/2022 8:35 am TECHNIQUE: Multiplanar multisequence MRI of the lumbar spine was performed without the administration of intravenous contrast. COMPARISON: None. HISTORY: ORDERING SYSTEM PROVIDED HISTORY: Midline low back pain without sciatica, unspecified chronicity TECHNOLOGIST PROVIDED HISTORY: Is the patient pregnant?->No FINDINGS: BONES/ALIGNMENT: The vertebral body heights are maintained.   There is age-appropriate bone marrow signal.  There is degenerative disc disease at the L4-5 level with loss of disc signal.  There is minimal disc space narrowing at L4-5. There is no spondylolisthesis. SPINAL CORD: Conus is normal in caliber and signal and terminates at the L1 level. The cauda equina is unremarkable. SOFT TISSUES: Posterior paraspinal soft tissues are unremarkable. The visualized abdominal structures are unremarkable. L1-L2: There is no significant disc herniation, spinal canal stenosis or neural foraminal narrowing. L2-L3: There is no significant disc herniation, spinal canal stenosis or neural foraminal narrowing. L3-L4: There is no significant disc herniation, spinal canal stenosis or neural foraminal narrowing. L4-L5: There is a circumferential disc bulge with facet hypertrophy. There is canal stenosis measuring 9 mm in AP dimension. There is narrowing of the lateral recesses. There is mild bilateral foraminal narrowing. L5-S1: Is a circumferential disc bulge with facet hypertrophy. There is no canal stenosis or foraminal narrowing. Multilevel degenerative change with mild canal stenosis at L4-5. Mild bilateral foraminal narrowing and lateral recess narrowing at L4-5. RECOMMENDATIONS: Unavailable     US GI ENDOSCOPIC S&I    Result Date: 3/10/2022  Radiology exam is complete. No Radiologist dictation. Please follow up with ordering provider. CT ABDOMEN PELVIS W IV CONTRAST Additional Contrast? None    Result Date: 3/12/2022  EXAMINATION: CT OF THE ABDOMEN AND PELVIS WITH CONTRAST 3/12/2022 6:35 am TECHNIQUE: CT of the abdomen and pelvis was performed with the administration of intravenous contrast. Multiplanar reformatted images are provided for review. Dose modulation, iterative reconstruction, and/or weight based adjustment of the mA/kV was utilized to reduce the radiation dose to as low as reasonably achievable.  COMPARISON: 17 December 2021 HISTORY: ORDERING SYSTEM PROVIDED HISTORY: Pain, nausea TECHNOLOGIST PROVIDED HISTORY: Pain, nausea Decision Support Exception - unselect if not a suspected or confirmed emergency medical condition->Emergency Medical Condition (MA) Reason for Exam: LUQ abdominal pain for a few days, nausea. Recent EGD. Hx of chronic pancreatitis FINDINGS: Lower Chest: Small hiatal hernia. No acute abnormality in the included lung bases. Organs: Liver, spleen, gallbladder, pancreas and kidneys demonstrate no acute abnormality. No renal mass or obstructing calculi. Tail of the pancreas is slightly prominent and there is peripancreatic haziness around the tail of the pancreas. This represents interval change from prior examination and suggests pancreatitis. Gallbladder is minimally dilated. GI/Bowel: No free fluid, free air, bowel obstruction or bowel wall thickening. Small fat containing umbilical hernia. Moderate colonic stool load. Pelvis: No evidence of appendicitis. Bladder is unremarkable. Trace pelvic fluid is noted which may be physiologic. Hypodensities are present in the adnexal areas less than 1 cm, consistent with follicular size cysts requiring no further follow-up. No pelvic or inguinal adenopathy is noted. Peritoneum/Retroperitoneum: No aortic aneurysm. No retroperitoneal or mesenteric adenopathy. Bones/Soft Tissues: Mild degenerative changes are present in the spine, scattered. No worrisome osseous or acute subcutaneous soft tissue abnormality. 1.  Slight enlargement of the tail of the pancreas with peripancreatic haziness suspicious for low-grade pancreatitis. 2.  Dilated gallbladder without gallstones. 3.  No gallstones, small-bowel obstruction, urinary obstruction or appendicitis. RECOMMENDATIONS: Consider correlation with serum lipase level. DIAGNOSES:   Diagnosis Orders   1. Biliary sludge     2. Symptomatic cholelithiasis         PLAN:  · We had a long discussion regarding the patient's symptoms.  Unclear whether biliary sludge contributes significantly to the patient's low-grade pancreatitis, that is more likely secondary to FNB. Pt may certainly have a component of gallbladder dysfunction which may explain (at least in some part) her epigastric discomfort and history of indigestion, biliary sludge may also contribute to this. Review of the pt's LFTs show normal values going back to 7/2021 so no clear history of biliary obstruction. We discussed the risks/benefits of cholecystectomy, in particular that removing the gallbladder may not resolve the patient's abdominal complaints and that this is likely multi-factorial. Pt expresses understanding of this and would like to proceed with surgery. · Risks include bleeding, infection, scarring, bile leak, damage to surrounding tissues, chance of damage to the common bile duct, risk of subtotal cholecystectomy, conversion to open procedure, need for further surgery or procedures. Benefits, alternatives, complications and procedure details were explained to the patient, all questions were answered.           Medical Decision Making: moderate complexity     Electronically signed by Corrine Rodriguez DO on 3/15/2022 at 8:57 AM

## 2022-03-16 ENCOUNTER — TELEPHONE (OUTPATIENT)
Dept: SURGERY | Age: 42
End: 2022-03-16

## 2022-03-16 NOTE — TELEPHONE ENCOUNTER
3/16/2022- Spoke to patient, she would like to move her surgery to a sooner date. surgery reschedule at [facility].  Patient confirmed and information mailed to patient.     Surgery date/time: 3/18/2022 at 1:30pm  Arrival time: 11:30am  Pre admission testing: phone call  Willis espinal appt: 4/5/2022 at 9:40am  *vaccinated

## 2022-03-16 NOTE — TELEPHONE ENCOUNTER
3/16/2022- Spoke to patient.  Surgery start time moved to 1:00pm, arrival time is 11:00am. Patient confirrmed

## 2022-03-17 ENCOUNTER — ANESTHESIA EVENT (OUTPATIENT)
Dept: OPERATING ROOM | Age: 42
End: 2022-03-17
Payer: COMMERCIAL

## 2022-03-17 ENCOUNTER — TELEPHONE (OUTPATIENT)
Dept: SURGERY | Age: 42
End: 2022-03-17

## 2022-03-17 RX ORDER — LEVOTHYROXINE SODIUM 125 UG/1
CAPSULE ORAL DAILY
COMMUNITY

## 2022-03-17 RX ORDER — TRAMADOL HYDROCHLORIDE 50 MG/1
TABLET ORAL
COMMUNITY
Start: 2022-03-15 | End: 2022-03-28

## 2022-03-17 NOTE — PROGRESS NOTES
Preoperative Instructions:    Stop eating solid foods at midnight the night prior to your surgery. Stop drinking clear liquids at midnight the night prior to your surgery. Arrive at the surgery center (3rd entrance) on __[phone]_____________ by ____1100___________. Please stop any blood thinning medications as directed by your surgeon or prescribing physician. Failure to stop certain medications may interfere with your scheduled surgery. These may include: Aspirin, Coumadin, Plavix, NSAIDS (Motrin, Aleve, Advil, Mobic, Celebrex), Eliquis, Pradaxa, Xarelto, Fish oil, and herbal supplements. You may continue the rest of your medications through the night before surgery unless instructed otherwise. Day of surgery please take only the following medication(s) with a small sip of water:Tirosint,protonix, Zofran as needed    Please shower with Hibiclens soap. Please use inhalers the day of surgery if needed      Reminders:  -If you are going home the day of your procedure, you will need a family member or friend to stay during the procedure and drive you home after your procedure. Your  must be 25years of age or older and able to sign off on your discharge instructions.    -If you are going home the same day of your surgery, someone must remain with you for the first 24 hours after your surgery if you receive sedation or anesthesia.      -Please do not wear any jewelery, lotions, contacts or body piercing the day of surgery

## 2022-03-17 NOTE — TELEPHONE ENCOUNTER
3/17/2022- [facility] informing patient surgery start time moved to 12:30pm, arrival time is 10:30am. Requested patient call back to confirm update.

## 2022-03-18 ENCOUNTER — ANESTHESIA (OUTPATIENT)
Dept: OPERATING ROOM | Age: 42
End: 2022-03-18
Payer: COMMERCIAL

## 2022-03-18 ENCOUNTER — HOSPITAL ENCOUNTER (OUTPATIENT)
Age: 42
Setting detail: OUTPATIENT SURGERY
Discharge: HOME | End: 2022-03-18
Attending: SURGERY | Admitting: SURGERY
Payer: COMMERCIAL

## 2022-03-18 VITALS — DIASTOLIC BLOOD PRESSURE: 67 MMHG | SYSTOLIC BLOOD PRESSURE: 108 MMHG | OXYGEN SATURATION: 99 % | TEMPERATURE: 96.8 F

## 2022-03-18 VITALS
HEIGHT: 63 IN | DIASTOLIC BLOOD PRESSURE: 82 MMHG | SYSTOLIC BLOOD PRESSURE: 133 MMHG | OXYGEN SATURATION: 98 % | RESPIRATION RATE: 15 BRPM | WEIGHT: 181 LBS | BODY MASS INDEX: 32.07 KG/M2 | HEART RATE: 91 BPM | TEMPERATURE: 96.5 F

## 2022-03-18 DIAGNOSIS — K83.8 BILIARY SLUDGE: Primary | ICD-10-CM

## 2022-03-18 LAB — HCG, PREGNANCY URINE (POC): NEGATIVE

## 2022-03-18 PROCEDURE — 6360000002 HC RX W HCPCS: Performed by: NURSE ANESTHETIST, CERTIFIED REGISTERED

## 2022-03-18 PROCEDURE — 7100000010 HC PHASE II RECOVERY - FIRST 15 MIN: Performed by: SURGERY

## 2022-03-18 PROCEDURE — 2500000003 HC RX 250 WO HCPCS

## 2022-03-18 PROCEDURE — 6370000000 HC RX 637 (ALT 250 FOR IP): Performed by: ANESTHESIOLOGY

## 2022-03-18 PROCEDURE — 3700000000 HC ANESTHESIA ATTENDED CARE: Performed by: SURGERY

## 2022-03-18 PROCEDURE — 6360000002 HC RX W HCPCS

## 2022-03-18 PROCEDURE — 3600000009 HC SURGERY ROBOT BASE: Performed by: SURGERY

## 2022-03-18 PROCEDURE — 7100000011 HC PHASE II RECOVERY - ADDTL 15 MIN: Performed by: SURGERY

## 2022-03-18 PROCEDURE — 47562 LAPAROSCOPIC CHOLECYSTECTOMY: CPT | Performed by: SURGERY

## 2022-03-18 PROCEDURE — 2709999900 HC NON-CHARGEABLE SUPPLY: Performed by: SURGERY

## 2022-03-18 PROCEDURE — 6360000002 HC RX W HCPCS: Performed by: ANESTHESIOLOGY

## 2022-03-18 PROCEDURE — 2580000003 HC RX 258: Performed by: ANESTHESIOLOGY

## 2022-03-18 PROCEDURE — 7100000000 HC PACU RECOVERY - FIRST 15 MIN: Performed by: SURGERY

## 2022-03-18 PROCEDURE — C9290 INJ, BUPIVACAINE LIPOSOME: HCPCS | Performed by: ANESTHESIOLOGY

## 2022-03-18 PROCEDURE — S2900 ROBOTIC SURGICAL SYSTEM: HCPCS | Performed by: SURGERY

## 2022-03-18 PROCEDURE — 3700000001 HC ADD 15 MINUTES (ANESTHESIA): Performed by: SURGERY

## 2022-03-18 PROCEDURE — 2500000003 HC RX 250 WO HCPCS: Performed by: ANESTHESIOLOGY

## 2022-03-18 PROCEDURE — 6370000000 HC RX 637 (ALT 250 FOR IP)

## 2022-03-18 PROCEDURE — 64488 TAP BLOCK BI INJECTION: CPT | Performed by: ANESTHESIOLOGY

## 2022-03-18 PROCEDURE — 81025 URINE PREGNANCY TEST: CPT

## 2022-03-18 PROCEDURE — 88304 TISSUE EXAM BY PATHOLOGIST: CPT

## 2022-03-18 PROCEDURE — 2500000003 HC RX 250 WO HCPCS: Performed by: NURSE ANESTHETIST, CERTIFIED REGISTERED

## 2022-03-18 PROCEDURE — 3600000019 HC SURGERY ROBOT ADDTL 15MIN: Performed by: SURGERY

## 2022-03-18 PROCEDURE — 7100000001 HC PACU RECOVERY - ADDTL 15 MIN: Performed by: SURGERY

## 2022-03-18 RX ORDER — SODIUM CHLORIDE 9 MG/ML
25 INJECTION, SOLUTION INTRAVENOUS PRN
Status: DISCONTINUED | OUTPATIENT
Start: 2022-03-18 | End: 2022-03-18 | Stop reason: HOSPADM

## 2022-03-18 RX ORDER — CLINDAMYCIN PHOSPHATE 900 MG/50ML
INJECTION INTRAVENOUS
Status: DISCONTINUED
Start: 2022-03-18 | End: 2022-03-18 | Stop reason: HOSPADM

## 2022-03-18 RX ORDER — MEPERIDINE HYDROCHLORIDE 50 MG/ML
12.5 INJECTION INTRAMUSCULAR; INTRAVENOUS; SUBCUTANEOUS EVERY 5 MIN PRN
Status: DISCONTINUED | OUTPATIENT
Start: 2022-03-18 | End: 2022-03-18 | Stop reason: HOSPADM

## 2022-03-18 RX ORDER — ONDANSETRON 2 MG/ML
INJECTION INTRAMUSCULAR; INTRAVENOUS
Status: COMPLETED
Start: 2022-03-18 | End: 2022-03-18

## 2022-03-18 RX ORDER — IPRATROPIUM BROMIDE AND ALBUTEROL SULFATE 2.5; .5 MG/3ML; MG/3ML
1 SOLUTION RESPIRATORY (INHALATION)
Status: DISCONTINUED | OUTPATIENT
Start: 2022-03-18 | End: 2022-03-18 | Stop reason: HOSPADM

## 2022-03-18 RX ORDER — FENTANYL CITRATE 50 UG/ML
100 INJECTION, SOLUTION INTRAMUSCULAR; INTRAVENOUS ONCE
Status: COMPLETED | OUTPATIENT
Start: 2022-03-18 | End: 2022-03-18

## 2022-03-18 RX ORDER — INDOCYANINE GREEN AND WATER 25 MG
KIT INJECTION
Status: DISCONTINUED
Start: 2022-03-18 | End: 2022-03-18 | Stop reason: HOSPADM

## 2022-03-18 RX ORDER — SODIUM CHLORIDE, SODIUM LACTATE, POTASSIUM CHLORIDE, CALCIUM CHLORIDE 600; 310; 30; 20 MG/100ML; MG/100ML; MG/100ML; MG/100ML
INJECTION, SOLUTION INTRAVENOUS CONTINUOUS
Status: DISCONTINUED | OUTPATIENT
Start: 2022-03-18 | End: 2022-03-18 | Stop reason: HOSPADM

## 2022-03-18 RX ORDER — ONDANSETRON 4 MG/1
4 TABLET, FILM COATED ORAL EVERY 8 HOURS PRN
Qty: 10 TABLET | Refills: 0 | Status: SHIPPED | OUTPATIENT
Start: 2022-03-18 | End: 2022-03-23

## 2022-03-18 RX ORDER — HYDROCODONE BITARTRATE AND ACETAMINOPHEN 5; 325 MG/1; MG/1
1 TABLET ORAL EVERY 6 HOURS PRN
Qty: 15 TABLET | Refills: 0 | Status: SHIPPED | OUTPATIENT
Start: 2022-03-18 | End: 2022-03-25

## 2022-03-18 RX ORDER — PROMETHAZINE HYDROCHLORIDE 25 MG/ML
6.25 INJECTION, SOLUTION INTRAMUSCULAR; INTRAVENOUS EVERY 5 MIN PRN
Status: DISCONTINUED | OUTPATIENT
Start: 2022-03-18 | End: 2022-03-18 | Stop reason: HOSPADM

## 2022-03-18 RX ORDER — APREPITANT 40 MG/1
CAPSULE ORAL
Status: COMPLETED
Start: 2022-03-18 | End: 2022-03-18

## 2022-03-18 RX ORDER — APREPITANT 40 MG/1
40 CAPSULE ORAL ONCE
Status: DISCONTINUED | OUTPATIENT
Start: 2022-03-18 | End: 2022-03-18 | Stop reason: HOSPADM

## 2022-03-18 RX ORDER — GLYCOPYRROLATE 1 MG/5 ML
SYRINGE (ML) INTRAVENOUS
Status: COMPLETED
Start: 2022-03-18 | End: 2022-03-18

## 2022-03-18 RX ORDER — SODIUM CHLORIDE 0.9 % (FLUSH) 0.9 %
5-40 SYRINGE (ML) INJECTION PRN
Status: DISCONTINUED | OUTPATIENT
Start: 2022-03-18 | End: 2022-03-18 | Stop reason: HOSPADM

## 2022-03-18 RX ORDER — ONDANSETRON 2 MG/ML
4 INJECTION INTRAMUSCULAR; INTRAVENOUS
Status: COMPLETED | OUTPATIENT
Start: 2022-03-18 | End: 2022-03-18

## 2022-03-18 RX ORDER — CLINDAMYCIN PHOSPHATE 150 MG/ML
INJECTION, SOLUTION INTRAVENOUS PRN
Status: DISCONTINUED | OUTPATIENT
Start: 2022-03-18 | End: 2022-03-18 | Stop reason: SDUPTHER

## 2022-03-18 RX ORDER — DOCUSATE SODIUM 100 MG/1
100 CAPSULE, LIQUID FILLED ORAL 2 TIMES DAILY
Qty: 20 CAPSULE | Refills: 0 | Status: SHIPPED | OUTPATIENT
Start: 2022-03-18

## 2022-03-18 RX ORDER — ROCURONIUM BROMIDE 10 MG/ML
INJECTION, SOLUTION INTRAVENOUS PRN
Status: DISCONTINUED | OUTPATIENT
Start: 2022-03-18 | End: 2022-03-18 | Stop reason: SDUPTHER

## 2022-03-18 RX ORDER — OXYCODONE HYDROCHLORIDE AND ACETAMINOPHEN 5; 325 MG/1; MG/1
2 TABLET ORAL
Status: DISCONTINUED | OUTPATIENT
Start: 2022-03-18 | End: 2022-03-18 | Stop reason: HOSPADM

## 2022-03-18 RX ORDER — BUPIVACAINE HYDROCHLORIDE 2.5 MG/ML
INJECTION, SOLUTION EPIDURAL; INFILTRATION; INTRACAUDAL
Status: COMPLETED | OUTPATIENT
Start: 2022-03-18 | End: 2022-03-18

## 2022-03-18 RX ORDER — LIDOCAINE HYDROCHLORIDE 10 MG/ML
INJECTION, SOLUTION EPIDURAL; INFILTRATION; INTRACAUDAL; PERINEURAL PRN
Status: DISCONTINUED | OUTPATIENT
Start: 2022-03-18 | End: 2022-03-18 | Stop reason: SDUPTHER

## 2022-03-18 RX ORDER — SODIUM CHLORIDE 0.9 % (FLUSH) 0.9 %
10 SYRINGE (ML) INJECTION EVERY 12 HOURS SCHEDULED
Status: DISCONTINUED | OUTPATIENT
Start: 2022-03-18 | End: 2022-03-18 | Stop reason: HOSPADM

## 2022-03-18 RX ORDER — MIDAZOLAM HYDROCHLORIDE 2 MG/2ML
2 INJECTION, SOLUTION INTRAMUSCULAR; INTRAVENOUS
Status: COMPLETED | OUTPATIENT
Start: 2022-03-18 | End: 2022-03-18

## 2022-03-18 RX ORDER — LIDOCAINE HYDROCHLORIDE 10 MG/ML
1 INJECTION, SOLUTION EPIDURAL; INFILTRATION; INTRACAUDAL; PERINEURAL
Status: DISCONTINUED | OUTPATIENT
Start: 2022-03-18 | End: 2022-03-18 | Stop reason: HOSPADM

## 2022-03-18 RX ORDER — PROPOFOL 10 MG/ML
INJECTION, EMULSION INTRAVENOUS PRN
Status: DISCONTINUED | OUTPATIENT
Start: 2022-03-18 | End: 2022-03-18 | Stop reason: SDUPTHER

## 2022-03-18 RX ORDER — BUPIVACAINE HYDROCHLORIDE 5 MG/ML
INJECTION, SOLUTION EPIDURAL; INTRACAUDAL
Status: DISCONTINUED
Start: 2022-03-18 | End: 2022-03-18 | Stop reason: WASHOUT

## 2022-03-18 RX ORDER — FENTANYL CITRATE 50 UG/ML
INJECTION, SOLUTION INTRAMUSCULAR; INTRAVENOUS PRN
Status: DISCONTINUED | OUTPATIENT
Start: 2022-03-18 | End: 2022-03-18 | Stop reason: SDUPTHER

## 2022-03-18 RX ORDER — NEOSTIGMINE METHYLSULFATE 5 MG/5 ML
SYRINGE (ML) INTRAVENOUS PRN
Status: DISCONTINUED | OUTPATIENT
Start: 2022-03-18 | End: 2022-03-18 | Stop reason: SDUPTHER

## 2022-03-18 RX ORDER — GLYCOPYRROLATE 1 MG/5 ML
SYRINGE (ML) INTRAVENOUS PRN
Status: DISCONTINUED | OUTPATIENT
Start: 2022-03-18 | End: 2022-03-18 | Stop reason: SDUPTHER

## 2022-03-18 RX ORDER — DIPHENHYDRAMINE HYDROCHLORIDE 50 MG/ML
INJECTION INTRAMUSCULAR; INTRAVENOUS PRN
Status: DISCONTINUED | OUTPATIENT
Start: 2022-03-18 | End: 2022-03-18 | Stop reason: SDUPTHER

## 2022-03-18 RX ORDER — EPHEDRINE SULFATE/0.9% NACL/PF 50 MG/5 ML
SYRINGE (ML) INTRAVENOUS PRN
Status: DISCONTINUED | OUTPATIENT
Start: 2022-03-18 | End: 2022-03-18 | Stop reason: SDUPTHER

## 2022-03-18 RX ORDER — LABETALOL 20 MG/4 ML (5 MG/ML) INTRAVENOUS SYRINGE
10
Status: DISCONTINUED | OUTPATIENT
Start: 2022-03-18 | End: 2022-03-18 | Stop reason: HOSPADM

## 2022-03-18 RX ORDER — FENTANYL CITRATE 50 UG/ML
INJECTION, SOLUTION INTRAMUSCULAR; INTRAVENOUS
Status: DISCONTINUED
Start: 2022-03-18 | End: 2022-03-18 | Stop reason: HOSPADM

## 2022-03-18 RX ORDER — DEXAMETHASONE SODIUM PHOSPHATE 10 MG/ML
INJECTION, SOLUTION INTRAMUSCULAR; INTRAVENOUS PRN
Status: DISCONTINUED | OUTPATIENT
Start: 2022-03-18 | End: 2022-03-18 | Stop reason: SDUPTHER

## 2022-03-18 RX ORDER — ONDANSETRON 2 MG/ML
INJECTION INTRAMUSCULAR; INTRAVENOUS PRN
Status: DISCONTINUED | OUTPATIENT
Start: 2022-03-18 | End: 2022-03-18 | Stop reason: SDUPTHER

## 2022-03-18 RX ORDER — MIDAZOLAM HYDROCHLORIDE 2 MG/2ML
2 INJECTION, SOLUTION INTRAMUSCULAR; INTRAVENOUS ONCE
Status: COMPLETED | OUTPATIENT
Start: 2022-03-18 | End: 2022-03-18

## 2022-03-18 RX ORDER — OXYCODONE HYDROCHLORIDE AND ACETAMINOPHEN 5; 325 MG/1; MG/1
1 TABLET ORAL
Status: COMPLETED | OUTPATIENT
Start: 2022-03-18 | End: 2022-03-18

## 2022-03-18 RX ORDER — OXYCODONE HYDROCHLORIDE AND ACETAMINOPHEN 5; 325 MG/1; MG/1
TABLET ORAL
Status: COMPLETED
Start: 2022-03-18 | End: 2022-03-18

## 2022-03-18 RX ORDER — MIDAZOLAM HYDROCHLORIDE 1 MG/ML
INJECTION INTRAMUSCULAR; INTRAVENOUS
Status: DISCONTINUED
Start: 2022-03-18 | End: 2022-03-18 | Stop reason: HOSPADM

## 2022-03-18 RX ORDER — SODIUM CHLORIDE 0.9 % (FLUSH) 0.9 %
10 SYRINGE (ML) INJECTION PRN
Status: DISCONTINUED | OUTPATIENT
Start: 2022-03-18 | End: 2022-03-18 | Stop reason: HOSPADM

## 2022-03-18 RX ORDER — DIPHENHYDRAMINE HYDROCHLORIDE 50 MG/ML
12.5 INJECTION INTRAMUSCULAR; INTRAVENOUS
Status: DISCONTINUED | OUTPATIENT
Start: 2022-03-18 | End: 2022-03-18 | Stop reason: HOSPADM

## 2022-03-18 RX ORDER — BUPIVACAINE HYDROCHLORIDE 2.5 MG/ML
INJECTION, SOLUTION EPIDURAL; INFILTRATION; INTRACAUDAL
Status: COMPLETED
Start: 2022-03-18 | End: 2022-03-18

## 2022-03-18 RX ORDER — MORPHINE SULFATE 2 MG/ML
2 INJECTION, SOLUTION INTRAMUSCULAR; INTRAVENOUS EVERY 5 MIN PRN
Status: DISCONTINUED | OUTPATIENT
Start: 2022-03-18 | End: 2022-03-18 | Stop reason: HOSPADM

## 2022-03-18 RX ORDER — SCOLOPAMINE TRANSDERMAL SYSTEM 1 MG/1
1 PATCH, EXTENDED RELEASE TRANSDERMAL ONCE
Status: DISCONTINUED | OUTPATIENT
Start: 2022-03-18 | End: 2022-03-18 | Stop reason: HOSPADM

## 2022-03-18 RX ORDER — SODIUM CHLORIDE 0.9 % (FLUSH) 0.9 %
5-40 SYRINGE (ML) INJECTION EVERY 12 HOURS SCHEDULED
Status: DISCONTINUED | OUTPATIENT
Start: 2022-03-18 | End: 2022-03-18 | Stop reason: HOSPADM

## 2022-03-18 RX ADMIN — LIDOCAINE HYDROCHLORIDE 50 MG: 10 INJECTION, SOLUTION EPIDURAL; INFILTRATION; INTRACAUDAL; PERINEURAL at 12:27

## 2022-03-18 RX ADMIN — DEXAMETHASONE SODIUM PHOSPHATE 5 MG: 10 INJECTION INTRAMUSCULAR; INTRAVENOUS at 12:38

## 2022-03-18 RX ADMIN — APREPITANT 40 MG: 40 CAPSULE ORAL at 11:46

## 2022-03-18 RX ADMIN — Medication 0.8 MG: at 13:10

## 2022-03-18 RX ADMIN — OXYCODONE HYDROCHLORIDE AND ACETAMINOPHEN 1 TABLET: 5; 325 TABLET ORAL at 14:37

## 2022-03-18 RX ADMIN — ROCURONIUM BROMIDE 50 MG: 10 INJECTION INTRAVENOUS at 12:27

## 2022-03-18 RX ADMIN — ONDANSETRON 4 MG: 2 INJECTION INTRAMUSCULAR; INTRAVENOUS at 13:11

## 2022-03-18 RX ADMIN — BUPIVACAINE HYDROCHLORIDE 60 ML: 2.5 INJECTION, SOLUTION EPIDURAL; INFILTRATION; INTRACAUDAL; PERINEURAL at 12:07

## 2022-03-18 RX ADMIN — SODIUM CHLORIDE, POTASSIUM CHLORIDE, SODIUM LACTATE AND CALCIUM CHLORIDE: 600; 310; 30; 20 INJECTION, SOLUTION INTRAVENOUS at 12:24

## 2022-03-18 RX ADMIN — HYDROMORPHONE HYDROCHLORIDE 0.5 MG: 1 INJECTION, SOLUTION INTRAMUSCULAR; INTRAVENOUS; SUBCUTANEOUS at 14:11

## 2022-03-18 RX ADMIN — FAMOTIDINE 20 MG: 10 INJECTION, SOLUTION INTRAVENOUS at 11:47

## 2022-03-18 RX ADMIN — CLINDAMYCIN PHOSPHATE 900 MG: 150 INJECTION, SOLUTION INTRAMUSCULAR; INTRAVENOUS at 12:37

## 2022-03-18 RX ADMIN — FENTANYL CITRATE 100 MCG: 50 INJECTION INTRAMUSCULAR; INTRAVENOUS at 11:59

## 2022-03-18 RX ADMIN — BUPIVACAINE 20 ML: 13.3 INJECTION, SUSPENSION, LIPOSOMAL INFILTRATION at 12:07

## 2022-03-18 RX ADMIN — FENTANYL CITRATE 50 MCG: 50 INJECTION, SOLUTION INTRAMUSCULAR; INTRAVENOUS at 12:27

## 2022-03-18 RX ADMIN — MIDAZOLAM 2 MG: 1 INJECTION INTRAMUSCULAR; INTRAVENOUS at 12:00

## 2022-03-18 RX ADMIN — HYDROMORPHONE HYDROCHLORIDE 0.5 MG: 1 INJECTION, SOLUTION INTRAMUSCULAR; INTRAVENOUS; SUBCUTANEOUS at 13:55

## 2022-03-18 RX ADMIN — ONDANSETRON 4 MG: 2 INJECTION INTRAMUSCULAR; INTRAVENOUS at 13:55

## 2022-03-18 RX ADMIN — Medication 10 MG: at 12:52

## 2022-03-18 RX ADMIN — DIPHENHYDRAMINE HYDROCHLORIDE 12.5 MG: 50 INJECTION, SOLUTION INTRAMUSCULAR; INTRAVENOUS at 12:38

## 2022-03-18 RX ADMIN — SODIUM CHLORIDE, POTASSIUM CHLORIDE, SODIUM LACTATE AND CALCIUM CHLORIDE: 600; 310; 30; 20 INJECTION, SOLUTION INTRAVENOUS at 12:45

## 2022-03-18 RX ADMIN — Medication 0.2 MG: at 12:50

## 2022-03-18 RX ADMIN — Medication 4 MG: at 13:10

## 2022-03-18 RX ADMIN — PROPOFOL 160 MG: 10 INJECTION, EMULSION INTRAVENOUS at 12:27

## 2022-03-18 RX ADMIN — MIDAZOLAM 2 MG: 1 INJECTION INTRAMUSCULAR; INTRAVENOUS at 11:59

## 2022-03-18 ASSESSMENT — PULMONARY FUNCTION TESTS
PIF_VALUE: 21
PIF_VALUE: 7
PIF_VALUE: 20
PIF_VALUE: 16
PIF_VALUE: 21
PIF_VALUE: 0
PIF_VALUE: 21
PIF_VALUE: 17
PIF_VALUE: 21
PIF_VALUE: 24
PIF_VALUE: 15
PIF_VALUE: 16
PIF_VALUE: 1
PIF_VALUE: 21
PIF_VALUE: 0
PIF_VALUE: 25
PIF_VALUE: 21
PIF_VALUE: 3
PIF_VALUE: 21
PIF_VALUE: 2
PIF_VALUE: 16
PIF_VALUE: 21
PIF_VALUE: 21
PIF_VALUE: 2
PIF_VALUE: 21
PIF_VALUE: 20
PIF_VALUE: 20
PIF_VALUE: 2
PIF_VALUE: 20
PIF_VALUE: 18
PIF_VALUE: 16
PIF_VALUE: 2
PIF_VALUE: 21
PIF_VALUE: 0
PIF_VALUE: 4
PIF_VALUE: 22
PIF_VALUE: 21
PIF_VALUE: 17
PIF_VALUE: 16
PIF_VALUE: 4
PIF_VALUE: 15
PIF_VALUE: 21
PIF_VALUE: 19
PIF_VALUE: 21
PIF_VALUE: 21
PIF_VALUE: 0
PIF_VALUE: 15
PIF_VALUE: 17
PIF_VALUE: 17
PIF_VALUE: 1
PIF_VALUE: 15
PIF_VALUE: 21
PIF_VALUE: 21
PIF_VALUE: 18
PIF_VALUE: 17

## 2022-03-18 ASSESSMENT — PAIN SCALES - GENERAL
PAINLEVEL_OUTOF10: 0
PAINLEVEL_OUTOF10: 7
PAINLEVEL_OUTOF10: 0
PAINLEVEL_OUTOF10: 7
PAINLEVEL_OUTOF10: 7
PAINLEVEL_OUTOF10: 6
PAINLEVEL_OUTOF10: 8

## 2022-03-18 ASSESSMENT — PAIN DESCRIPTION - DESCRIPTORS
DESCRIPTORS: SORE;PRESSURE
DESCRIPTORS: SORE

## 2022-03-18 ASSESSMENT — PAIN DESCRIPTION - PAIN TYPE
TYPE: SURGICAL PAIN

## 2022-03-18 ASSESSMENT — PAIN DESCRIPTION - LOCATION
LOCATION: ABDOMEN

## 2022-03-18 ASSESSMENT — PAIN - FUNCTIONAL ASSESSMENT: PAIN_FUNCTIONAL_ASSESSMENT: 0-10

## 2022-03-18 NOTE — ANESTHESIA POSTPROCEDURE EVALUATION
Department of Anesthesiology  Postprocedure Note    Patient: Herminio Mari  MRN: [id number]  YOB: 1980  Date of evaluation: 3/18/2022  Time:  2:27 PM     Procedure Summary     Date: 03/18/22 Room / Location: [address] [place] [address] / [address] [facility] [place]    Anesthesia Start: 5607 Anesthesia Stop: 9554    Procedure: CHOLECYSTECTOMY LAPAROSCOPIC ROBOTIC (N/A ) Diagnosis:       (SYMPTOMATIC CHOLELITHIASIS)      (PANCREATITIS)      (GALLSTONES)    Surgeons: Mitul Haney DO Responsible Provider: Josey Carey MD    Anesthesia Type: general, regional ASA Status: 3          Anesthesia Type: general, regional    Crispin Phase I: Crispin Score: 4    Crispin Phase II:      Last vitals: Reviewed and per EMR flowsheets.        Anesthesia Post Evaluation    Patient location during evaluation: PACU  Patient participation: complete - patient participated  Level of consciousness: awake and alert  Airway patency: patent  Nausea & Vomiting: no nausea and no vomiting  Complications: no  Cardiovascular status: hemodynamically stable  Respiratory status: oral airway, face mask and spontaneous ventilation  Hydration status: euvolemic  Multimodal analgesia pain management approach

## 2022-03-18 NOTE — PROGRESS NOTES
CLINICAL PHARMACY NOTE: MEDS TO BEDS    Total # of Prescriptions Filled: 3   The following medications were delivered to the patient:  · Ondansetron 4mg  · Norco 5-325  · Dok 100mg    Additional Documentation:

## 2022-03-18 NOTE — OP NOTE
Operative Note      Patient: Azucena Hogue  YOB: 1980  MRN: [id number]    Date of Procedure: 3/18/2022    Pre-Op Diagnosis: SYMPTOMATIC CHOLELITHIASIS    Post-Op Diagnosis: Same       Procedure(s):  CHOLECYSTECTOMY LAPAROSCOPIC ROBOTIC    Surgeon(s):  Mitch Robles DO    Assistant:   * No surgical staff found *    Anesthesia: General    Estimated Blood Loss (mL): less than 5cc    Complications: None    Specimens:   ID Type Source Tests Collected by Time Destination   A : GALLBLADDER Tissue Tissue SURGICAL PATHOLOGY Mitch Robles DO 3/18/2022 1303        Implants:  * No implants in log *      Drains: * No LDAs found *    Findings: as above. Wound class 2    Detailed Description of Procedure:         HISTORY: The patient is a 38y.o. year old female with history of above preop diagnosis. The risk, benefits, expected outcome, and alternatives to the procedure were explained to the patient's understanding and written informed consent was obtained. TAP block was performed prior to procedure. DESCRIPTION OF PROCEDURE:  Patient was brought to the operating suite and placed on the operating table in supine position. Timeout was performed verifying correct patient, position, equipment and procedure to be performed. EPC cuffs were applied and preoperative antibiotics were infused. General anesthesia administered and the patient was endotracheally intubated. The patient's abdomen was prepped and draped in the usual sterile fashion. Scalpel was used to make a transverse incision 1cm at Mora's point, 8mm Optiview trocar was used to gain entry into the abdomen under direct visualization, no injury to the underlying structures were seen. 1cm transverse incisions were then made below  the umbilicus and two additional incisions in the RLQ at the midclavicular and anterior axillary lines. 8mm robot ports were placed under direct visualization at these sites. Pneumoperitoneum established with 15mmHg CO2.     The

## 2022-03-18 NOTE — ANESTHESIA PRE PROCEDURE
Department of Anesthesiology  Preprocedure Note       Name:  Dusty Mello   Age:  38 y.o.  :  1980                                          MRN:  [id number]         Date:  3/18/2022      Surgeon: Chris Reece):  Corrine Rodriguez DO    Procedure: Procedure(s):  CHOLECYSTECTOMY LAPAROSCOPIC ROBOTIC    Medications prior to admission:   Prior to Admission medications    Medication Sig Start Date End Date Taking? Authorizing Provider   HYDROcodone-acetaminophen (NORCO) 5-325 MG per tablet Take 1 tablet by mouth every 6 hours as needed for Pain for up to 7 days. 3/18/22 3/25/22 Yes Corrine Rodriguez DO   docusate sodium (COLACE) 100 MG capsule Take 1 capsule by mouth 2 times daily 3/18/22  Yes Corrine Rodriguez DO   [facility] 4 MG tablet Take 1 tablet by mouth every 8 hours as needed for Nausea or Vomiting 3/18/22 3/23/22 Yes Corrine Rodriguez DO   Levothyroxine Sodium (TIROSINT) 125 MCG CAPS Take by mouth Daily   Yes Historical Provider, MD   traMADol (ULTRAM) 50 MG tablet TAKE 1 TO 2 TABLETS BY MOUTH EVERY 8 HOURS AS NEEDED 3/15/22   Historical Provider, MD   FLUoxetine (PROZAC) 10 MG tablet Take 10 mg by mouth daily    Historical Provider, MD   melatonin 3 MG TABS tablet Take 3 mg by mouth daily    Historical Provider, MD   ondansetron (ZOFRAN ODT) 4 MG disintegrating tablet Take 1 tablet by mouth every 8 hours as needed for Nausea 3/10/22   Cristobal Gracia DO   metoprolol succinate (TOPROL XL) 25 MG extended release tablet 25 mg daily  21   Historical Provider, MD   gabapentin (NEURONTIN) 100 MG capsule Take 1 capsule by mouth 2 times daily for 5 days.  Intended supply: 90 days 11/13/21 3/10/22  Nga Conley MD   tiZANidine (ZANAFLEX) 2 MG tablet TAKE 1 TABLET BY MOUTH THREE TIMES DAILY AS NEEDED 21   Historical Provider, MD   MAGNESIUM GLYCINATE PO Take 400 mg by mouth daily as needed    Historical Provider, MD   vitamin E 180 MG (400 UNIT) CAPS capsule Take 180 mg by mouth daily    Historical Provider, MD   pantoprazole (PROTONIX) 40 MG tablet  9/13/21   Historical Provider, MD   meloxicam (MOBIC) 7.5 MG tablet TAKE 1 TABLET BY MOUTH TWICE DAILY AS NEEDED FOR PAIN 7/21/21   Historical Provider, MD   pantoprazole sodium (PROTONIX) 40 MG PACK packet 40 mg 2 times daily (before meals)     Historical Provider, MD   LORazepam (ATIVAN) 0.5 MG tablet LORazepam 0.5 MG Oral Tablet  TAKE 1 TABLET DAILY AS NEEDED. Quantity: 30 Refills: 0     Bridgton Meuse D.O.; Active    Historical Provider, MD       Current medications:    Current Facility-Administered Medications   Medication Dose Route Frequency Provider Last Rate Last Admin    lidocaine PF 1 % injection 1 mL  1 mL IntraDERmal Once PRN Dinesh Britt, MD        lactated ringers infusion   IntraVENous Continuous Dinesh Hoangze, MD        sodium chloride flush 0.9 % injection 10 mL  10 mL IntraVENous 2 times per day Dinesh Lorenza, MD        sodium chloride flush 0.9 % injection 10 mL  10 mL IntraVENous PRN Dinesh Boltonze, MD        0.9 % sodium chloride infusion  25 mL IntraVENous PRN Dinesh Hoangze, MD        indocyanine green (IC-GREEN) 25 MG syringe             clindamycin (CLEOCIN) 900 MG/50ML IVPB                Allergies: Allergies   Allergen Reactions    Penicillins Other (See Comments)    Sudafed [Pseudoephedrine Hcl] Other (See Comments)     Jittery       Problem List:    Patient Active Problem List   Diagnosis Code    Iron deficiency anemia D50.9    Vitamin B12 deficiency anemia D51.9    Anxiety and depression F41.9, F32. A    Pre-diabetes R73.03    Gastroesophageal reflux disease without esophagitis K21.9    Postoperative hypothyroidism E89.0    History of thyroid cancer Z85.850    Abnormal mammogram of right breast R92.8    Breast pain N64.4    Fibromyalgia M79.7       Past Medical History:        Diagnosis Date    Anxiety     Asthma     Cancer (HCC)-skin     thyroid cancer    Depression     Fibromyalgia  History of anemia     has had iron infusions    Hypertension     Myofascial pain     of abdomen     Pancreatitis     PONV (postoperative nausea and vomiting)     Thyroid nodule     thyroid cancer       Past Surgical History:        Procedure Laterality Date    COLONOSCOPY  02/2022    ENDOSCOPY, COLON, DIAGNOSTIC      SKIN BIOPSY      SKIN CANCER EXCISION      THYROIDECTOMY  05/2021    UPPER GASTROINTESTINAL ENDOSCOPY  02/2022    UPPER GASTROINTESTINAL ENDOSCOPY N/A 3/10/2022    EGD W/EUS FNA performed by [facility]ust[facility] at [facility]    UPPER GASTROINTESTINAL ENDOSCOPY  3/10/2022    EGD BIOPSY performed by Agustina Darden MD at [facility]       Social History:    Social History     Tobacco Use    Smoking status: Never Smoker    Smokeless tobacco: Never Used   Substance Use Topics    Alcohol use: Not Currently                                Counseling given: Not Answered      Vital Signs (Current):   Vitals:    03/18/22 1039 03/18/22 1054   BP:  131/82   Pulse:  87   Resp:  15   Temp:  97 °F (36.1 °C)   SpO2:  100%   Weight: 181 lb (82.1 kg)    Height: 5' 3\" (1.6 m)                                               BP Readings from Last 3 Encounters:   03/18/22 131/82   03/12/22 122/82   03/11/22 103/66       NPO Status: Time of last liquid consumption: 1900                        Time of last solid consumption: 1900                        Date of last liquid consumption: 03/17/22                        Date of last solid food consumption: 03/17/22    BMI:   Wt Readings from Last 3 Encounters:   03/18/22 181 lb (82.1 kg)   03/14/22 183 lb (83 kg)   03/10/22 183 lb (83 kg)     Body mass index is 32.06 kg/m².     CBC:   Lab Results   Component Value Date    WBC 13.6 03/12/2022    RBC 4.08 03/12/2022    HGB 12.2 03/12/2022    HCT 36.9 03/12/2022    MCV 90.4 03/12/2022    RDW 12.7 03/12/2022     03/12/2022       CMP:   Lab Results   Component Value Date     03/12/2022 K 3.6 03/12/2022     03/12/2022    CO2 25 03/12/2022    BUN 10 03/12/2022    CREATININE 0.57 03/12/2022    GFRAA >60 03/12/2022    LABGLOM >60 03/12/2022    GLUCOSE 120 03/12/2022    PROT 7.3 03/12/2022    CALCIUM 9.3 03/12/2022    BILITOT 0.33 03/12/2022    ALKPHOS 77 03/12/2022    AST 11 03/12/2022    ALT 12 03/12/2022       POC Tests: No results for input(s): POCGLU, POCNA, POCK, POCCL, POCBUN, POCHEMO, POCHCT in the last 72 hours. Coags: No results found for: PROTIME, INR, APTT    HCG (If Applicable):   Lab Results   Component Value Date    HCG NEGATIVE 03/18/2022        ABGs: No results found for: PHART, PO2ART, TFZ7USR, NIW2OOE, BEART, Z4NTEUUJ     Type & Screen (If Applicable):  No results found for: LABABO, LABRH    Drug/Infectious Status (If Applicable):  No results found for: HIV, HEPCAB    COVID-19 Screening (If Applicable): No results found for: COVID19        Anesthesia Evaluation  Patient summary reviewed and Nursing notes reviewed   history of anesthetic complications: PONV. Airway: Mallampati: IV  TM distance: >3 FB   Neck ROM: full  Mouth opening: > = 3 FB Dental: normal exam         Pulmonary:Negative Pulmonary ROS and normal exam                               Cardiovascular:    (+) hypertension:,       ECG reviewed                     ROS comment: -EKG - SR @ 75     Neuro/Psych:                ROS comment: -NUMBNESS TINGLING BILATERAL HANDS AND FEET GI/Hepatic/Renal:   (+) GERD: well controlled,          ROS comment: -NAUSEA. Endo/Other:    (+) hypothyroidism::., malignancy/cancer. ROS comment: -THYROID CANCER S/P THYROIDECTOMY  -NPO AFTER MIDNIGHT  -ALLERGIES - PCN, SUDAFED Abdominal:             Vascular:           ROS comment: -ANEMIA. Other Findings:             Anesthesia Plan      general and regional     ASA 3     (GETA, TAP BLOCK)  Induction: intravenous. MIPS: Postoperative opioids intended and Prophylactic antiemetics administered.   Anesthetic plan and risks discussed with patient. Plan discussed with CRNA.     Attending anesthesiologist reviewed and agrees with Eric Aldridge MD   3/18/2022

## 2022-03-18 NOTE — ANESTHESIA PROCEDURE NOTES
Peripheral Block    Patient location during procedure: pre-op  Start time: 3/18/2022 12:03 PM  End time: 3/18/2022 12:07 PM  Staffing  Performed: anesthesiologist   Anesthesiologist: Aj Wells MD  Preanesthetic Checklist  Completed: patient identified, IV checked, site marked, risks and benefits discussed, surgical consent, monitors and equipment checked, pre-op evaluation, timeout performed, anesthesia consent given, oxygen available and patient being monitored  Peripheral Block  Patient position: supine  Prep: ChloraPrep  Patient monitoring: cardiac monitor, continuous pulse ox and frequent blood pressure checks  Block type: TAP  Laterality: bilateral  Injection technique: single-shot  Guidance: ultrasound guided  Provider prep: mask and sterile gloves  Needle  Needle type: combined needle/nerve stimulator   Needle gauge: 20 G  Needle length: 4 IN.   Needle localization: anatomical landmarks and ultrasound guidance  Assessment  Injection assessment: negative aspiration for heme, no paresthesia on injection and local visualized surrounding nerve on ultrasound  Paresthesia pain: none  Slow fractionated injection: yes  Hemodynamics: stable  Medications Administered  Bupivacaine liposome (EXPAREL) injection 1.3%, 20 mL  bupivacaine (MARCAINE) PF injection 0.25%, 60 mL  Reason for block: post-op pain management and at surgeon's request

## 2022-03-18 NOTE — H&P
[address]      Patient's Name/ Date of Birth/ Gender: Kerline Tellez / 1980 (38 y.o.) / female     Attending physician: Oj Alva DO    CC: biliary sludge, symptomatic cholelithiasis    History of present Illness: Kerline Tellez is a 38 y.o. female, presents today for cholecystectomy. Past Medical History:  has a past medical history of Anxiety, Asthma, Cancer (HCC)-skin, Depression, Fibromyalgia, History of anemia, Hypertension, Myofascial pain, Pancreatitis, PONV (postoperative nausea and vomiting), and Thyroid nodule. Past Surgical History:   Past Surgical History:   Procedure Laterality Date    COLONOSCOPY  02/2022    ENDOSCOPY, COLON, DIAGNOSTIC      SKIN BIOPSY      SKIN CANCER EXCISION      THYROIDECTOMY  05/2021    UPPER GASTROINTESTINAL ENDOSCOPY  02/2022    UPPER GASTROINTESTINAL ENDOSCOPY N/A 3/10/2022    EGD W/EUS FNA performed by Courtney Jones MD at [facility]    UPPER GASTROINTESTINAL ENDOSCOPY  3/10/2022    EGD BIOPSY performed by Courtney Jones MD at [facility]       Social History:  reports that she has never smoked. She has never used smokeless tobacco. She reports previous alcohol use. She reports previous drug use. Drug: Marijuana [facility]). Family History: family history includes Lung Cancer in her mother; Other in her father; Sleep Apnea in her father. Review of Systems:   General: Denies fever, chills, night sweats, weight loss, malaise, fatigue  HEENT: Denies sore throat, sinus problems, allergic rhinosinusitis  Card: Denies chest pain, palpitations, orthopnea/PND. Denies h/o murmurs  Pulm: Denies cough, shortness of breath, PALMER  GI:  Epigastric/upper abdominal pain    : Denies polyuria, dysuria, hematuria  Endo: Denies diabetes, thyroid problems. Heme: Denies anemia, h/o bleeding or clotting problems.    Neuro: Denies h/o CVA, TIA  Skin: Denies rashes, ulcers  Musculoskeletal: Denies muscle, joint, back pain. Allergies: Penicillins and Sudafed [pseudoephedrine hcl]    Current Meds:  Current Facility-Administered Medications:     lidocaine PF 1 % injection 1 mL, 1 mL, IntraDERmal, Once PRN, Dinesh Britt MD    lactated ringers infusion, , IntraVENous, Continuous, Dinesh Britt MD    sodium chloride flush 0.9 % injection 10 mL, 10 mL, IntraVENous, 2 times per day, Dinesh Britt MD    sodium chloride flush 0.9 % injection 10 mL, 10 mL, IntraVENous, PRN, Dinesh Britt MD    0.9 % sodium chloride infusion, 25 mL, IntraVENous, PRN, Dinesh Britt MD    ceFAZolin (ANCEF) 2000 mg in dextrose 5 % 50 mL IVPB, 2,000 mg, IntraVENous, On Call to OR, Darlean Heimlich, DO    indocyanine green (IC-GREEN) 25 MG syringe, , , ,     clindamycin (CLEOCIN) 900 MG/50ML IVPB, , , ,     ceFAZolin (ANCEF) IVPB, , , ,     Vital Signs:  Vitals:    03/18/22 1054   BP: 131/82   Pulse: 87   Resp: 15   Temp: 97 °F (36.1 °C)   SpO2: 100%       Physical Exam:  Vital signs and Nurse's note reviewed  Gen:  A&Ox3, NAD  HEENT: NCAT, PERRLA, EOMI, no scleral icterus, oral mucosa moist  Neck: Trachea midline without obvious masses or lesions  Chest: Symmetric rise with inhalation, no evidence of trauma  CVS: S1S2  Resp: Good bilateral air entry, no audible wheeze or rhonchi  Abd: soft, non-tender, non-distended, no hepatosplenomegaly or palpable masses  Ext: No clubbing, cyanosis, edema, peripheral pulses 2+ Rad/Fem/DP/PT  CNS: Moves all extremities, no gross focal motor deficits  Skin: No erythema or ulcerations         Assessment:    Clay Celeste is a 38 y.o. female with     biliary sludge, symptomatic cholelithiasis    Plan:    1.  To OR for cholecystectomy, all questions answered, written informed consent obtained      Darlean Heimlich, DO  3/18/2022

## 2022-03-19 ENCOUNTER — HOSPITAL ENCOUNTER (EMERGENCY)
Age: 42
Discharge: HOME | End: 2022-03-19
Attending: EMERGENCY MEDICINE
Payer: COMMERCIAL

## 2022-03-19 VITALS
WEIGHT: 180 LBS | RESPIRATION RATE: 16 BRPM | TEMPERATURE: 98.6 F | OXYGEN SATURATION: 99 % | HEART RATE: 73 BPM | DIASTOLIC BLOOD PRESSURE: 88 MMHG | HEIGHT: 63 IN | SYSTOLIC BLOOD PRESSURE: 130 MMHG | BODY MASS INDEX: 31.89 KG/M2

## 2022-03-19 DIAGNOSIS — H53.8 BLURRY VISION: Primary | ICD-10-CM

## 2022-03-19 DIAGNOSIS — T50.905S DRUG SIDE EFFECTS, SEQUELA: ICD-10-CM

## 2022-03-19 DIAGNOSIS — R33.9 URINARY RETENTION: ICD-10-CM

## 2022-03-19 PROCEDURE — 99283 EMERGENCY DEPT VISIT LOW MDM: CPT

## 2022-03-19 ASSESSMENT — ENCOUNTER SYMPTOMS
RESPIRATORY NEGATIVE: 1
GASTROINTESTINAL NEGATIVE: 1
ALLERGIC/IMMUNOLOGIC NEGATIVE: 1

## 2022-03-19 ASSESSMENT — VISUAL ACUITY
OS: 20/25
OU: 20/20
OD: 20/20

## 2022-03-19 ASSESSMENT — PAIN SCALES - GENERAL: PAINLEVEL_OUTOF10: 5

## 2022-03-19 ASSESSMENT — PAIN - FUNCTIONAL ASSESSMENT: PAIN_FUNCTIONAL_ASSESSMENT: 0-10

## 2022-03-19 ASSESSMENT — PAIN DESCRIPTION - LOCATION: LOCATION: ABDOMEN

## 2022-03-19 ASSESSMENT — PAIN DESCRIPTION - PAIN TYPE: TYPE: ACUTE PAIN

## 2022-03-19 NOTE — ED NOTES
Patient provided with discharge instructions, prescriptions, and follow up information. Verbalized understanding. No IV access to discontinue. A&OX3. Steady gait noted at discharge. Wheelchair declined by patient.       Baudilio Hernandez RN  03/19/22 1958

## 2022-03-19 NOTE — ED PROVIDER NOTES
eMERGENCY dEPARTMENT eNCOUnter      Pt Name: Clay Celeste  MRN: [id number]  [place] 1980  Date of evaluation: 3/19/2022      CHIEF COMPLAINT       Chief Complaint   Patient presents with   Carolann Spurling     had scopalamine patch on until around noon today, had surgery yesterday for gallbladder around 1230    Urinary Frequency     states small amounts of urine,           HISTORY OF PRESENT ILLNESS    Clay Celeste is a 38 y.o. female who presents urgency department for evaluation of blurry vision and urinary retention with small amounts of urine. Upon further history it was elicited that the patient had just had gallbladder surgery and had put a scopolamine patch on and we had that until around 12 noon today. These are most likely just side effects from the scopolamine she has no other issues and there is no lateralizing neurologic signs or any kind of headache or any other issue that would be more concerned. Her vital signs are all stable. Visual acuity is 20/25. She is unable to accommodate to read near vision. REVIEW OF SYSTEMS         Review of Systems   Constitutional: Negative. HENT: Negative. Eyes: Positive for visual disturbance. Respiratory: Negative. Gastrointestinal: Negative. Endocrine: Negative. Genitourinary: Positive for difficulty urinating. Musculoskeletal: Negative. Skin: Negative. Allergic/Immunologic: Negative. Neurological: Negative. Hematological: Negative. Psychiatric/Behavioral: Negative. PAST MEDICAL HISTORY    has a past medical history of Anxiety, Asthma, Cancer (HCC)-skin, Depression, Fibromyalgia, History of anemia, Hypertension, Myofascial pain, Pancreatitis, PONV (postoperative nausea and vomiting), and Thyroid nodule. SURGICAL HISTORY      has a past surgical history that includes Skin cancer excision; Thyroidectomy (05/2021); Colonoscopy (02/2022); Upper gastrointestinal endoscopy (02/2022);  Upper gastrointestinal endoscopy (N/A, 3/10/2022); Upper gastrointestinal endoscopy (3/10/2022); skin biopsy; Endoscopy, colon, diagnostic; and Cholecystectomy, laparoscopic (03/18/2022). CURRENT MEDICATIONS       Previous Medications    DOCUSATE SODIUM (COLACE) 100 MG CAPSULE    Take 1 capsule by mouth 2 times daily    FLUOXETINE (PROZAC) 10 MG TABLET    Take 10 mg by mouth daily    GABAPENTIN (NEURONTIN) 100 MG CAPSULE    Take 1 capsule by mouth 2 times daily for 5 days. Intended supply: 90 days    HYDROCODONE-ACETAMINOPHEN (NORCO) 5-325 MG PER TABLET    Take 1 tablet by mouth every 6 hours as needed for Pain for up to 7 days. LEVOTHYROXINE SODIUM (TIROSINT) 125 MCG CAPS    Take by mouth Daily    LORAZEPAM (ATIVAN) 0.5 MG TABLET    LORazepam 0.5 MG Oral Tablet  TAKE 1 TABLET DAILY AS NEEDED. Quantity: 30 Refills: 0     Lennis Salvage D.O.; Active    MAGNESIUM GLYCINATE PO    Take 400 mg by mouth daily as needed    MELATONIN 3 MG TABS TABLET    Take 3 mg by mouth daily    MELOXICAM (MOBIC) 7.5 MG TABLET    TAKE 1 TABLET BY MOUTH TWICE DAILY AS NEEDED FOR PAIN    METOPROLOL SUCCINATE (TOPROL XL) 25 MG EXTENDED RELEASE TABLET    25 mg daily     ONDANSETRON (ZOFRAN ODT) 4 MG DISINTEGRATING TABLET    Take 1 tablet by mouth every 8 hours as needed for Nausea    ONDANSETRON (ZOFRAN) 4 MG TABLET    Take 1 tablet by mouth every 8 hours as needed for Nausea or Vomiting    PANTOPRAZOLE (PROTONIX) 40 MG TABLET        PANTOPRAZOLE SODIUM (PROTONIX) 40 MG PACK PACKET    40 mg 2 times daily (before meals)     TIZANIDINE (ZANAFLEX) 2 MG TABLET    TAKE 1 TABLET BY MOUTH THREE TIMES DAILY AS NEEDED    TRAMADOL (ULTRAM) 50 MG TABLET    TAKE 1 TO 2 TABLETS BY MOUTH EVERY 8 HOURS AS NEEDED    VITAMIN E 180 MG (400 UNIT) CAPS CAPSULE    Take 180 mg by mouth daily       ALLERGIES     is allergic to penicillins and sudafed [pseudoephedrine hcl]. FAMILY HISTORY     She indicated that her mother is alive.  She indicated that her father is alive. family history includes Lung Cancer in her mother; Other in her father; Sleep Apnea in her father. SOCIAL HISTORY      reports that she has never smoked. She has never used smokeless tobacco. She reports previous alcohol use. She reports previous drug use. Drug: Marijuana [facility]). PHYSICAL EXAM     INITIAL VITALS:  height is 5' 3\" (1.6 m) and weight is 81.6 kg (180 lb). Her oral temperature is 98.6 °F (37 °C). Her blood pressure is 130/88 and her pulse is 73. Her respiration is 16 and oxygen saturation is 99%. Constitutional: Alert, oriented x3, nontoxic, afebrile, answering questions appropriately, acting properly for age, in no acute distress  HEENT: Extraocular muscles intact, mucus membranes moist, TMs clear bilaterally, no posterior pharyngeal erythema or exudates, Pupils equal, round, reactive to light,   Neck: Trachea midline, Supple without lymphadenopathy, no posterior midline neck tenderness to palpation  Cardiovascular: Regular rhythm and rate no S3, S4, or murmurs  Respiratory: Clear to auscultation bilaterally no wheezes, rhonchi, rales, no respiratory distress  Gastrointestinal: Soft, nontender, nondistended, positive bowel sounds. No rebound, rigidity, or guarding. Musculoskeletal: No extremity pain or swelling  Neurologic: Moving all 4 extremities without difficulty there are no gross focal neurologic deficits  Skin: Warm and dry      DIFFERENTIAL DIAGNOSIS/ MDM:     Medication reaction secondary to scopolamine- these are side effects that are  quite common, with this drug and these will wear off- half-life is anywhere from 9 to 15 hours. We expect that the patient will get continuously better. There is no sign of any other acutely concerning problem that needs further evaluation here in the emergency department.     DIAGNOSTIC RESULTS     EKG: All EKG's are interpreted by the Emergency Department Physician who either signs or Co-signs this chart in the absence of a cardiologist.        Not indicated unless otherwise documented above    LABS:  No results found for this visit on 03/19/22. Not indicated unless otherwise documented above    RADIOLOGY:   I reviewed the radiologist interpretations:  No orders to display       Not indicated unless otherwise documented above    EMERGENCY DEPARTMENT COURSE:     The patient was given the following medications:  No orders of the defined types were placed in this encounter. Vitals:    Vitals:    03/19/22 1848   BP: 130/88   Pulse: 73   Resp: 16   Temp: 98.6 °F (37 °C)   TempSrc: Oral   SpO2: 99%   Weight: 81.6 kg (180 lb)   Height: 5' 3\" (1.6 m)     -------------------------  /88   Pulse 73   Temp 98.6 °F (37 °C) (Oral)   Resp 16   Ht 5' 3\" (1.6 m)   Wt 81.6 kg (180 lb)   LMP 03/03/2022 (Approximate)   SpO2 99%   BMI 31.89 kg/m²         I have reviewed the disposition diagnosis with the patient and or their family/guardian. I have answered their questions and given discharge instructions. They voiced understanding of these instructions and did not have any further questions or complaints. CRITICAL CARE:    None    CONSULTS:    None    PROCEDURES:    None      OARRS Report if indicated             FINAL IMPRESSION      1. Blurry vision    2. Urinary retention    3. Drug side effects, sequela          DISPOSITION/PLAN   DISPOSITION Decision To Discharge    I have reviewed the disposition diagnosis with the patient and or their family/guardian. I have answered their questions and given discharge instructions. They voiced understanding of these instructions and did not have any further questions or complaints. Reevaluation: The patient has remained hemodynamically stable with no neurologic deficits. She is suffering from side effects of scopolamine which we are certain will wear off within the next few hours.   Patient is safe to be discharged home I do not believe there is any radiographic or laboratory evaluation that needs to be done at this point in time. These effects have been explained to the patient and she is satisfied with this and is willing to be discharged.       PATIENT REFERRED TO:  Zbigniew Manzanares MD  [facility] [phone]    In 2 days        DISCHARGE MEDICATIONS:  New Prescriptions    No medications on file       (Please note that portions of this note were completed with a voice recognition program.  Efforts were made to edit the dictations but occasionally words are mis-transcribed.)    Jyoti Resendez MD  Attending Emergency Physician           Jyoti Resendez MD  03/19/22 4925

## 2022-03-22 LAB — SURGICAL PATHOLOGY REPORT: NORMAL

## 2022-03-27 ENCOUNTER — HOSPITAL ENCOUNTER (EMERGENCY)
Age: 42
Discharge: HOME | End: 2022-03-27
Attending: EMERGENCY MEDICINE
Payer: COMMERCIAL

## 2022-03-27 ENCOUNTER — APPOINTMENT (OUTPATIENT)
Dept: CT IMAGING | Age: 42
End: 2022-03-27
Payer: COMMERCIAL

## 2022-03-27 VITALS
RESPIRATION RATE: 16 BRPM | WEIGHT: 170 LBS | OXYGEN SATURATION: 99 % | BODY MASS INDEX: 30.12 KG/M2 | SYSTOLIC BLOOD PRESSURE: 128 MMHG | HEIGHT: 63 IN | TEMPERATURE: 97.6 F | DIASTOLIC BLOOD PRESSURE: 86 MMHG | HEART RATE: 68 BPM

## 2022-03-27 DIAGNOSIS — R10.9 ABDOMINAL PAIN, UNSPECIFIED ABDOMINAL LOCATION: Primary | ICD-10-CM

## 2022-03-27 LAB
-: ABNORMAL
ABSOLUTE EOS #: 0 K/UL (ref 0–0.4)
ABSOLUTE LYMPH #: 2 K/UL (ref 1–4.8)
ABSOLUTE MONO #: 0.6 K/UL (ref 0.1–1.2)
ALBUMIN SERPL-MCNC: 4 G/DL (ref 3.5–5.2)
ALBUMIN/GLOBULIN RATIO: 1.2 (ref 1–2.5)
ALP BLD-CCNC: 100 U/L (ref 35–104)
ALT SERPL-CCNC: 18 U/L (ref 5–33)
ANION GAP SERPL CALCULATED.3IONS-SCNC: 13 MMOL/L (ref 9–17)
AST SERPL-CCNC: 10 U/L
BACTERIA: ABNORMAL
BASOPHILS # BLD: 1 % (ref 0–2)
BASOPHILS ABSOLUTE: 0 K/UL (ref 0–0.2)
BILIRUB SERPL-MCNC: 0.26 MG/DL (ref 0.3–1.2)
BILIRUBIN DIRECT: <0.08 MG/DL
BILIRUBIN URINE: NEGATIVE
BILIRUBIN, INDIRECT: ABNORMAL MG/DL (ref 0–1)
BUN BLDV-MCNC: 8 MG/DL (ref 6–20)
CALCIUM SERPL-MCNC: 9.1 MG/DL (ref 8.6–10.4)
CHLORIDE BLD-SCNC: 101 MMOL/L (ref 98–107)
CO2: 24 MMOL/L (ref 20–31)
COLOR: YELLOW
CREAT SERPL-MCNC: 0.6 MG/DL (ref 0.5–0.9)
EOSINOPHILS RELATIVE PERCENT: 0 % (ref 1–4)
EPITHELIAL CELLS UA: ABNORMAL /HPF (ref 0–5)
GFR AFRICAN AMERICAN: >60 ML/MIN
GFR NON-AFRICAN AMERICAN: >60 ML/MIN
GFR SERPL CREATININE-BSD FRML MDRD: ABNORMAL ML/MIN/{1.73_M2}
GLUCOSE BLD-MCNC: 151 MG/DL (ref 70–99)
GLUCOSE URINE: NEGATIVE
HCG QUALITATIVE: NEGATIVE
HCT VFR BLD CALC: 38.5 % (ref 36–46)
HEMOGLOBIN: 12.6 G/DL (ref 12–16)
KETONES, URINE: NEGATIVE
LEUKOCYTE ESTERASE, URINE: NEGATIVE
LIPASE: 38 U/L (ref 13–60)
LYMPHOCYTES # BLD: 19 % (ref 24–44)
MCH RBC QN AUTO: 29.3 PG (ref 26–34)
MCHC RBC AUTO-ENTMCNC: 32.6 G/DL (ref 31–37)
MCV RBC AUTO: 89.8 FL (ref 80–100)
MONOCYTES # BLD: 6 % (ref 2–11)
MUCUS: ABNORMAL
NITRITE, URINE: NEGATIVE
OTHER OBSERVATIONS UA: ABNORMAL
PDW BLD-RTO: 13.2 % (ref 12.5–15.4)
PH UA: 7 (ref 5–8)
PLATELET # BLD: 481 K/UL (ref 140–450)
PMV BLD AUTO: 8.1 FL (ref 6–12)
POTASSIUM SERPL-SCNC: 4.1 MMOL/L (ref 3.7–5.3)
PROTEIN UA: NEGATIVE
RBC # BLD: 4.29 M/UL (ref 4–5.2)
RBC UA: ABNORMAL /HPF (ref 0–2)
SEG NEUTROPHILS: 74 % (ref 36–66)
SEGMENTED NEUTROPHILS ABSOLUTE COUNT: 7.7 K/UL (ref 1.8–7.7)
SODIUM BLD-SCNC: 138 MMOL/L (ref 135–144)
SPECIFIC GRAVITY UA: 1.01 (ref 1–1.03)
TOTAL PROTEIN: 7.4 G/DL (ref 6.4–8.3)
TURBIDITY: ABNORMAL
URINE HGB: ABNORMAL
UROBILINOGEN, URINE: NORMAL
WBC # BLD: 10.4 K/UL (ref 3.5–11)
WBC UA: ABNORMAL /HPF (ref 0–5)

## 2022-03-27 PROCEDURE — 96361 HYDRATE IV INFUSION ADD-ON: CPT

## 2022-03-27 PROCEDURE — 80076 HEPATIC FUNCTION PANEL: CPT

## 2022-03-27 PROCEDURE — 85025 COMPLETE CBC W/AUTO DIFF WBC: CPT

## 2022-03-27 PROCEDURE — 80048 BASIC METABOLIC PNL TOTAL CA: CPT

## 2022-03-27 PROCEDURE — 6360000002 HC RX W HCPCS: Performed by: EMERGENCY MEDICINE

## 2022-03-27 PROCEDURE — 84703 CHORIONIC GONADOTROPIN ASSAY: CPT

## 2022-03-27 PROCEDURE — 96375 TX/PRO/DX INJ NEW DRUG ADDON: CPT

## 2022-03-27 PROCEDURE — 36415 COLL VENOUS BLD VENIPUNCTURE: CPT

## 2022-03-27 PROCEDURE — 81001 URINALYSIS AUTO W/SCOPE: CPT

## 2022-03-27 PROCEDURE — 2580000003 HC RX 258: Performed by: EMERGENCY MEDICINE

## 2022-03-27 PROCEDURE — 83690 ASSAY OF LIPASE: CPT

## 2022-03-27 PROCEDURE — 96374 THER/PROPH/DIAG INJ IV PUSH: CPT

## 2022-03-27 PROCEDURE — 6360000004 HC RX CONTRAST MEDICATION: Performed by: EMERGENCY MEDICINE

## 2022-03-27 PROCEDURE — 99285 EMERGENCY DEPT VISIT HI MDM: CPT

## 2022-03-27 PROCEDURE — 74177 CT ABD & PELVIS W/CONTRAST: CPT

## 2022-03-27 RX ORDER — OXYCODONE HYDROCHLORIDE AND ACETAMINOPHEN 5; 325 MG/1; MG/1
1 TABLET ORAL EVERY 4 HOURS PRN
COMMUNITY
End: 2022-03-28

## 2022-03-27 RX ORDER — SODIUM CHLORIDE 0.9 % (FLUSH) 0.9 %
10 SYRINGE (ML) INJECTION PRN
Status: DISCONTINUED | OUTPATIENT
Start: 2022-03-27 | End: 2022-03-27 | Stop reason: HOSPADM

## 2022-03-27 RX ORDER — 0.9 % SODIUM CHLORIDE 0.9 %
1000 INTRAVENOUS SOLUTION INTRAVENOUS ONCE
Status: COMPLETED | OUTPATIENT
Start: 2022-03-27 | End: 2022-03-27

## 2022-03-27 RX ORDER — ONDANSETRON 2 MG/ML
4 INJECTION INTRAMUSCULAR; INTRAVENOUS ONCE
Status: DISCONTINUED | OUTPATIENT
Start: 2022-03-27 | End: 2022-03-27

## 2022-03-27 RX ORDER — FENTANYL CITRATE 50 UG/ML
50 INJECTION, SOLUTION INTRAMUSCULAR; INTRAVENOUS ONCE
Status: COMPLETED | OUTPATIENT
Start: 2022-03-27 | End: 2022-03-27

## 2022-03-27 RX ORDER — PROMETHAZINE HYDROCHLORIDE 25 MG/1
25 TABLET ORAL EVERY 6 HOURS PRN
Qty: 20 TABLET | Refills: 0 | Status: SHIPPED | OUTPATIENT
Start: 2022-03-27 | End: 2022-04-03

## 2022-03-27 RX ORDER — 0.9 % SODIUM CHLORIDE 0.9 %
80 INTRAVENOUS SOLUTION INTRAVENOUS ONCE
Status: DISCONTINUED | OUTPATIENT
Start: 2022-03-27 | End: 2022-03-27 | Stop reason: HOSPADM

## 2022-03-27 RX ADMIN — SODIUM CHLORIDE 1000 ML: 9 INJECTION, SOLUTION INTRAVENOUS at 11:39

## 2022-03-27 RX ADMIN — SODIUM CHLORIDE, PRESERVATIVE FREE 10 ML: 5 INJECTION INTRAVENOUS at 11:53

## 2022-03-27 RX ADMIN — FENTANYL CITRATE 50 MCG: 50 INJECTION INTRAMUSCULAR; INTRAVENOUS at 11:43

## 2022-03-27 RX ADMIN — Medication 80 ML: at 11:52

## 2022-03-27 RX ADMIN — IOPAMIDOL 75 ML: 755 INJECTION, SOLUTION INTRAVENOUS at 11:52

## 2022-03-27 RX ADMIN — PROMETHAZINE HYDROCHLORIDE: 25 INJECTION INTRAMUSCULAR; INTRAVENOUS at 11:46

## 2022-03-27 ASSESSMENT — PAIN DESCRIPTION - LOCATION: LOCATION: ABDOMEN

## 2022-03-27 ASSESSMENT — ENCOUNTER SYMPTOMS
NAUSEA: 1
DIARRHEA: 1
CONSTIPATION: 1
COUGH: 0
PHOTOPHOBIA: 0
VOMITING: 1
CHEST TIGHTNESS: 0
ABDOMINAL PAIN: 1
BACK PAIN: 1
SORE THROAT: 0
RHINORRHEA: 0
SHORTNESS OF BREATH: 1

## 2022-03-27 ASSESSMENT — PAIN DESCRIPTION - PAIN TYPE: TYPE: ACUTE PAIN;SURGICAL PAIN

## 2022-03-27 ASSESSMENT — PAIN - FUNCTIONAL ASSESSMENT: PAIN_FUNCTIONAL_ASSESSMENT: 0-10

## 2022-03-27 ASSESSMENT — PAIN SCALES - GENERAL
PAINLEVEL_OUTOF10: 5
PAINLEVEL_OUTOF10: 0
PAINLEVEL_OUTOF10: 5

## 2022-03-27 NOTE — ED PROVIDER NOTES
[id number] [facility] ED  [address] [facility] [address]  Phone: [phone]  Fax: [phone]        Pt Name: Marah Noel  MRN: [id number]  [place] 1980  Date of evaluation: 3/27/22      CHIEF COMPLAINT     Chief Complaint   Patient presents with    Post-op Problem    Abdominal Pain    Nausea         HISTORY OF PRESENT ILLNESS  (Location/Symptom, Timing/Onset, Context/Setting, Quality, Duration, Modifying Factors, Severity.)    Marah Noel is a 38 y.o. female who presents with abdominal pain and nausea. She had a cholecystectomy done for biliary sludge by Dr Kathia Lester on 3/18/22. The patient reports that she has had abdominal pain since October. She states she had an EUS and ultimately underwent a biopsy of a pancreatic mass which was negative for malignancy. She states she was told to have her gall bladder removed due to sludging of her gall bladder. She states the surgery went fine, but her pain remained. She states she was admitted to [facility] and was kept NPO for a few days. She states they started her on solid foods and she as discharged on Friday. She states that since her discharge she has been extremely nauseated. She has been treating her symptoms with percocet and Zofran without much relief. She reports that she has chills, sweating of her feet, and extreme nausea. She went to [facility] yesterday because she is going to establish care with a new GI doctor and her first appointment is on Wednesday. She states she was told she has chronic pancreatitis, but her lipase was normal at [facility] yesterday. She states she was told by the on call surgeon from here to come to the ED for a scan to make sure she doesn't have a bile leak. The patient's pain is located in the left upper quadrant. She describes it as stabbing and pressure. It is gnawing. It is non-radiating. She's now also feeling pain to her right upper quadrant, which is stabbing.  Her last po intake was a half of a protein shake just before she left the house. She takes creon with her meals. She reports generalized weakness. She reports that the pain on the left is worse with eating. She states when she gets the pancreatic enzyme supplement right she doesn't have much increase in pain. The patient reports that she did not have a CT at [facility] yesterday. She reports that her tap block site is very painful. REVIEW OF SYSTEMS    (2-9 systems for level 4, 10 or more for level 5)     Review of Systems   Constitutional: Positive for chills and diaphoresis. Negative for fever. HENT: Negative for congestion, hearing loss, rhinorrhea and sore throat. Eyes: Positive for visual disturbance. Negative for photophobia. Blurring in her left eye   Respiratory: Positive for shortness of breath. Negative for cough and chest tightness. Cardiovascular: Negative for chest pain and palpitations. Gastrointestinal: Positive for abdominal pain, constipation, diarrhea, nausea and vomiting. Genitourinary: Negative for dysuria, frequency and urgency. Musculoskeletal: Positive for back pain. Negative for neck pain. Skin: Negative for rash and wound. Neurological: Positive for dizziness, light-headedness and headaches. Hematological: Negative for adenopathy. Does not bruise/bleed easily. PAST MEDICAL HISTORY    has a past medical history of Anxiety, Asthma, Cancer (HCC)-skin, Depression, Fibromyalgia, History of anemia, Hypertension, Myofascial pain, Pancreatitis, PONV (postoperative nausea and vomiting), and Thyroid nodule. SURGICAL HISTORY      has a past surgical history that includes Skin cancer excision; Thyroidectomy (05/2021); Colonoscopy (02/2022); Upper gastrointestinal endoscopy (02/2022); Upper gastrointestinal endoscopy (N/A, 3/10/2022); Upper gastrointestinal endoscopy (3/10/2022); skin biopsy; Endoscopy, colon, diagnostic;  Cholecystectomy, laparoscopic (03/18/2022); and Cholecystectomy, laparoscopic (N/A, 3/18/2022). [address]       Discharge Medication List as of 3/27/2022  1:55 PM      CONTINUE these medications which have NOT CHANGED    Details   oxyCODONE-acetaminophen (PERCOCET) 5-325 MG per tablet Take 1 tablet by mouth every 4 hours as needed for Pain. Historical Med      docusate sodium (COLACE) 100 MG capsule Take 1 capsule by mouth 2 times daily, Disp-20 capsule, R-0Normal      Levothyroxine Sodium (TIROSINT) 125 MCG CAPS Take by mouth DailyHistorical Med      traMADol (ULTRAM) 50 MG tablet TAKE 1 TO 2 TABLETS BY MOUTH EVERY 8 HOURS AS NEEDEDHistorical Med      FLUoxetine (PROZAC) 10 MG tablet Take 10 mg by mouth dailyHistorical Med      melatonin 3 MG TABS tablet Take 3 mg by mouth dailyHistorical Med      ondansetron (ZOFRAN ODT) 4 MG disintegrating tablet Take 1 tablet by mouth every 8 hours as needed for Nausea, Disp-10 tablet, R-0Normal      metoprolol succinate (TOPROL XL) 25 MG extended release tablet 25 mg daily Historical Med      gabapentin (NEURONTIN) 100 MG capsule Take 1 capsule by mouth 2 times daily for 5 days. Intended supply: 90 days, Disp-10 capsule, R-1Normal      tiZANidine (ZANAFLEX) 2 MG tablet TAKE 1 TABLET BY MOUTH THREE TIMES DAILY AS NEEDEDHistorical Med      MAGNESIUM GLYCINATE PO Take 400 mg by mouth daily as neededHistorical Med      meloxicam (MOBIC) 7.5 MG tablet TAKE 1 TABLET BY MOUTH TWICE DAILY AS NEEDED FOR PAINHistorical Med      pantoprazole sodium (PROTONIX) 40 MG PACK packet 40 mg 2 times daily (before meals) Historical Med      LORazepam (ATIVAN) 0.5 MG tablet LORazepam 0.5 MG Oral Tablet  TAKE 1 TABLET DAILY AS NEEDED. Quantity: 30 Refills: 0     Lanney Crumbly D.O.; ActiveHistorical Med             ALLERGIES     is allergic to penicillins and sudafed [pseudoephedrine hcl]. FAMILY HISTORY     She indicated that her mother is alive. She indicated that her father is alive. family history includes Lung Cancer in her mother;  Other in her father; Sleep Apnea in her father. SOCIAL HISTORY      reports that she has never smoked. She has never used smokeless tobacco. She reports previous alcohol use. She reports previous drug use. Drug: Marijuana Dolph Marva). PHYSICAL EXAM    (up to 7 for level 4, 8 or more for level 5)   INITIAL VITALS:  height is 5' 3\" (1.6 m) and weight is 170 lb (77.1 kg). Her oral temperature is 97.6 °F (36.4 °C). Her blood pressure is 128/86 and her pulse is 68. Her respiration is 16 and oxygen saturation is 99%. Physical Exam  Vitals and nursing note reviewed. HENT:      Head: Normocephalic and atraumatic. Cardiovascular:      Rate and Rhythm: Normal rate and regular rhythm. Heart sounds: Normal heart sounds. No murmur heard. No friction rub. No gallop. Pulmonary:      Effort: Pulmonary effort is normal.      Breath sounds: Normal breath sounds. No wheezing, rhonchi or rales. Abdominal:      General: Abdomen is flat. Bowel sounds are normal.      Palpations: Abdomen is soft. Tenderness: There is abdominal tenderness in the right upper quadrant, epigastric area and left upper quadrant. There is no guarding or rebound. Comments: Incisions appear to be healing well   Skin:     General: Skin is warm and dry. Capillary Refill: Capillary refill takes less than 2 seconds. Neurological:      General: No focal deficit present. Mental Status: She is alert and oriented to person, place, and time. Psychiatric:         Mood and Affect: Mood normal.         Behavior: Behavior normal.         DIFFERENTIAL DIAGNOSIS/ MDM:     I estimate there is LOW risk for ACUTE APPENDICITIS, BOWEL OBSTRUCTION, CHOLECYSTITIS, DIVERTICULITIS, INCARCERATED HERNIA, PANCREATITIS, or PERFORATED BOWEL or ULCER, thus I consider the discharge disposition reasonable. Re-evaluation of the abdomen is benign. No guarding, peritoneal signs or significant tenderness noted. Also, there is no evidence or peritonitis, sepsis, or toxicity. The patient and/or family and I have discussed the diagnosis and risks, and we agree with discharging home to follow-up with their primary doctor. The patient presents with abdominal pain without signs of peritonitis or other life-threatening or serious etiology. The patient appears stable for discharge and has been instructed to return immediately if the symptoms worsen in any way, or in 8-12 hr if not improved for re-evaluation. We also discussed returning to the Emergency Department immediately if new or worsening symptoms occur. We have discussed the symptoms which are most concerning (e.g., bloody stool, fever, changing or worsening pain, persistent vomiting, chest pain shortness of breath, numbness or weakness to the arms or legs, coolness or color change to the arms or legs) that necessitate immediate return. The patient understands that at this time there is no evidence for a more malignant underlying process, but the patient also understands that early in the process of an illness or injury, an emergency department workup can be falsely reassuring. Routine discharge counseling was given, and the patient understands that worsening, changing or persistent symptoms should prompt an immediate call or follow up with their primary physician or return to the emergency department. The importance of appropriate follow up was also discussed. I have reviewed the disposition diagnosis with the patient and or their family/guardian. I have answered their questions and given discharge instructions. They voiced understanding of these instructions and did not have any further questions or complaints.       DIAGNOSTIC RESULTS     EKG: All EKG's are interpreted by the Emergency Department Physician who either signs or Co-signs this chart in the absence of a cardiologist.    none    RADIOLOGY:        Interpretation per the Radiologist below, if available at the time of this note:        Magda Additional Contrast? None    Result Date: 3/27/2022  EXAMINATION: CT OF THE ABDOMEN AND PELVIS WITH CONTRAST 3/27/2022 11:25 am TECHNIQUE: CT of the abdomen and pelvis was performed with the administration of intravenous contrast. Multiplanar reformatted images are provided for review. Dose modulation, iterative reconstruction, and/or weight based adjustment of the mA/kV was utilized to reduce the radiation dose to as low as reasonably achievable. COMPARISON: 03/12/2022 HISTORY: ORDERING SYSTEM PROVIDED HISTORY: RUQ pain, s/p cholecystectomy; LUQ pain TECHNOLOGIST PROVIDED HISTORY: RUQ pain, s/p cholecystectomy; LUQ pain Decision Support Exception - unselect if not a suspected or confirmed emergency medical condition->Emergency Medical Condition (MA) Reason for Exam: Rt and lt upper abdomen pain, post cholecystectomy FINDINGS: Lower Chest: The visualized heart and lungs show no acute abnormalities. Organs: Status post cholecystectomy 9 days ago. There is some fat stranding and edema at the gallbladder fossa consistent with expected post cholecystectomy changes. No drainable fluid collection. Liver, spleen, pancreas, adrenal glands and kidneys show no significant abnormalities noting small right renal cyst in the midpole. GI/Bowel: There is limited evaluation due to absence of oral contrast.  Small sliding hiatal hernia otherwise stomach unremarkable. No bowel obstruction. Normal appendix. No acute process in the colon. Pelvis: Subtle posterior 3.8 cm uterine mass of identical attenuation as myometrium most compatible with leiomyoma. Urinary bladder unremarkable. Peritoneum/Retroperitoneum: No free intraperitoneal fluid or significant lymphadenopathy. Vascular structures enhance satisfactorily. Bones/Soft Tissues: No acute bony abnormality. Small focal area of anterior right subcostal subcutaneous tissue infiltration and another in the left lower abdomen compatible with postsurgical changes of laparoscopy. 1. Status post cholecystectomy 9 days ago. Expected postsurgical edema and filtration at the gallbladder fossa. No drainable fluid collection.  2. Sessile 3.8 cm posterior leiomyoma unchanged and also dating back to remote study of October 2021           LABS:  Results for orders placed or performed during the hospital encounter of 30/04/93   Basic Metabolic Panel   Result Value Ref Range    Glucose 151 (H) 70 - 99 mg/dL    BUN 8 6 - 20 mg/dL    CREATININE 0.60 0.50 - 0.90 mg/dL    Calcium 9.1 8.6 - 10.4 mg/dL    Sodium 138 135 - 144 mmol/L    Potassium 4.1 3.7 - 5.3 mmol/L    Chloride 101 98 - 107 mmol/L    CO2 24 20 - 31 mmol/L    Anion Gap 13 9 - 17 mmol/L    GFR Non-African American >60 >60 mL/min    GFR African American >60 >60 mL/min    GFR Comment         CBC with Auto Differential   Result Value Ref Range    WBC 10.4 3.5 - 11.0 k/uL    RBC 4.29 4.0 - 5.2 m/uL    Hemoglobin 12.6 12.0 - 16.0 g/dL    Hematocrit 38.5 36 - 46 %    MCV 89.8 80 - 100 fL    MCH 29.3 26 - 34 pg    MCHC 32.6 31 - 37 g/dL    RDW 13.2 12.5 - 15.4 %    Platelets 297 (H) 369 - 450 k/uL    MPV 8.1 6.0 - 12.0 fL    Seg Neutrophils 74 (H) 36 - 66 %    Lymphocytes 19 (L) 24 - 44 %    Monocytes 6 2 - 11 %    Eosinophils % 0 (L) 1 - 4 %    Basophils 1 0 - 2 %    Segs Absolute 7.70 1.8 - 7.7 k/uL    Absolute Lymph # 2.00 1.0 - 4.8 k/uL    Absolute Mono # 0.60 0.1 - 1.2 k/uL    Absolute Eos # 0.00 0.0 - 0.4 k/uL    Basophils Absolute 0.00 0.0 - 0.2 k/uL   Lipase   Result Value Ref Range    Lipase 38 13 - 60 U/L   Hepatic Function Panel   Result Value Ref Range    Albumin 4.0 3.5 - 5.2 g/dL    Alkaline Phosphatase 100 35 - 104 U/L    ALT 18 5 - 33 U/L    AST 10 <32 U/L    Total Bilirubin 0.26 (L) 0.3 - 1.2 mg/dL    Bilirubin, Direct <0.08 <0.31 mg/dL    Bilirubin, Indirect Can not be calculated 0.00 - 1.00 mg/dL    Total Protein 7.4 6.4 - 8.3 g/dL    Albumin/Globulin Ratio 1.2 1.0 - 2.5   HCG Qualitative, Serum   Result Value Ref Range    hCG Qual NEGATIVE NEGATIVE   Urinalysis with Reflex to Culture    Specimen: Urine   Result Value Ref Range    Color, UA Yellow Yellow    Turbidity UA SLIGHTLY CLOUDY (A) Clear    Glucose, Ur NEGATIVE NEGATIVE    Bilirubin Urine NEGATIVE NEGATIVE    Ketones, Urine NEGATIVE NEGATIVE    Specific Gravity, UA 1.007 1.005 - 1.030    Urine Hgb LARGE (A) NEGATIVE    pH, UA 7.0 5.0 - 8.0    Protein, UA NEGATIVE NEGATIVE    Urobilinogen, Urine Normal Normal    Nitrite, Urine NEGATIVE NEGATIVE    Leukocyte Esterase, Urine NEGATIVE NEGATIVE   Microscopic Urinalysis   Result Value Ref Range    -          WBC, UA 2 TO 5 0 - 5 /HPF    RBC, UA 50  0 - 2 /HPF    Epithelial Cells UA 10 TO 20 0 - 5 /HPF    Bacteria, UA MODERATE (A) None    Mucus, UA 1+ (A) None    Other Observations UA (A) NOT REQ. Utilizing a urinalysis as the only screening method to exclude a potential uropathogen can be unreliable in many patient populations. Rapid screening tests are less sensitive than culture and if UTI is a clinical possibility, culture should be considered despite a negative urinalysis. No leukocytosis. Normal lipase and LFTs. EMERGENCY DEPARTMENT COURSE:   Vitals:    Vitals:    03/27/22 1201 03/27/22 1317 03/27/22 1318 03/27/22 1400   BP: (!) 141/84 (!) 127/93  128/86   Pulse: 70   68   Resp: 14   16   Temp:       TempSrc:       SpO2: 98%  99% 99%   Weight:       Height:         -------------------------  BP: 128/86, Temp: 97.6 °F (36.4 °C), Pulse: 68, Resp: 16      RE-EVALUATION:  12:56 PM EDT  Patient is feeling better after phenergan and fentanyl. CONSULTS:  General Surgery  I discussed the patient with Dr Greg Moe. He will evaluate the patient. PROCEDURES:  None    FINAL IMPRESSION      1. Abdominal pain, unspecified abdominal location          DISPOSITION/PLAN   DISPOSITION        CONDITION ON DISPOSITION:   Stable     PATIENT REFERRED TO:  [facility] ED  [address] [facility] [id number]    If symptoms worsen    See below  Please call your surgeon for follow-up appointment soon as possible. Keep your appointment with your GI physician          DISCHARGE MEDICATIONS:  Discharge Medication List as of 3/27/2022  1:55 PM      START taking these medications    Details   promethazine (PHENERGAN) 25 MG tablet Take 1 tablet by mouth every 6 hours as needed for Nausea WARNING:  May cause drowsiness. May impair ability to operate vehicles or machinery.   Do not use in combination with alcohol., Disp-20 tablet, R-0Print             (Please note that portions of this note were completed with a voicerecognition program.  Efforts were made to edit the dictations but occasionally words are mis-transcribed.)    Ana Fraser MD  Attending Emergency Medicine Physician        Ana Fraser MD  03/27/22 MD Vincent  03/30/22 2853

## 2022-03-27 NOTE — PROGRESS NOTES
Patient seen and evaluated by surgery at bedside. Patient feeling much better after Phenergan. Abdomen soft nontender labs and CT reviewed she has follow-up with her surgeon this week and also has follow-up with GI patient. She is feeling much better this time I feel she stable discharge. Will send short prescription for Phenergan.   She understands for any return for any worsening symptoms    Diagnosis: Abdominal pain

## 2022-03-27 NOTE — CONSULTS
I spoke with patient this am.  Patient with continued nausea since before surgery. Patient denies abdominal pain. Complains of fevers and chills. Instructed patient to go to emergency department for further evaluation. CT images reviewed. Labs reviewed. Abdomen is soft, nontender, and nondistended. Patient does not have an acute surgical abdomen. Recommend calling appointment to see Dr. Rachel Carlton tomorrow. Patient has appointment with GI at [facility] on Wednesday.     Electronically signed by Ashley Granados IV, DO on 3/27/22 at 1:23 PM EDT

## 2022-03-28 DIAGNOSIS — R10.12 LEFT UPPER QUADRANT ABDOMINAL PAIN: Primary | ICD-10-CM

## 2022-03-28 RX ORDER — OXYCODONE HYDROCHLORIDE AND ACETAMINOPHEN 5; 325 MG/1; MG/1
1 TABLET ORAL EVERY 4 HOURS PRN
OUTPATIENT
Start: 2022-03-28

## 2022-03-28 RX ORDER — OXYCODONE HYDROCHLORIDE AND ACETAMINOPHEN 5; 325 MG/1; MG/1
1 TABLET ORAL EVERY 6 HOURS PRN
Qty: 13 TABLET | Refills: 0 | Status: SHIPPED | OUTPATIENT
Start: 2022-03-28 | End: 2022-04-04

## 2022-03-28 NOTE — TELEPHONE ENCOUNTER
Pt is requesting refill oxyCODONE-acetaminophen (PERCOCET) 5-325 MG per tablet      robot cholecystectomy(03/18/22)

## 2022-03-28 NOTE — PROGRESS NOTES
Pt notes show visit to ED yesterday complaining of LUQ pain and questionable TAP site pain, imaging was negative for any postop complication. Pt did report LUQ pain to me on our initial encounter that had been present prior to cholecystectomy. She contacted my clinic earlier today requesting additional percocet, since she is still in the recovery period for her cholecystectomy I will order a partial refill of Percocet in accordance with the MME limits set by the [place], plan is for her to follow up in clinic on 4/1.      Electronically signed by Cole Diaz DO on 3/28/2022 at 2:09 PM

## 2022-04-05 ENCOUNTER — OFFICE VISIT (OUTPATIENT)
Dept: SURGERY | Age: 42
End: 2022-04-05

## 2022-04-05 ENCOUNTER — HOSPITAL ENCOUNTER (OUTPATIENT)
Dept: NUCLEAR MEDICINE | Age: 42
Discharge: HOME | End: 2022-04-07
Payer: COMMERCIAL

## 2022-04-05 DIAGNOSIS — R14.0 ABDOMINAL DISTENTION: ICD-10-CM

## 2022-04-05 DIAGNOSIS — Z90.49 STATUS POST LAPAROSCOPIC CHOLECYSTECTOMY: Primary | ICD-10-CM

## 2022-04-05 PROCEDURE — 3430000000 HC RX DIAGNOSTIC RADIOPHARMACEUTICAL: Performed by: INTERNAL MEDICINE

## 2022-04-05 PROCEDURE — A9541 TC99M SULFUR COLLOID: HCPCS | Performed by: INTERNAL MEDICINE

## 2022-04-05 PROCEDURE — 78264 GASTRIC EMPTYING IMG STUDY: CPT

## 2022-04-05 PROCEDURE — 99024 POSTOP FOLLOW-UP VISIT: CPT | Performed by: SURGERY

## 2022-04-05 RX ADMIN — Medication 1 MILLICURIE: at 07:58

## 2022-04-05 NOTE — LETTER
[facility]  Surgical Specialties - General Surgery  [address]  [facility]  Phone: [phone]  Fax: [phone]      22    Patient: Kaye Carmichael  MRN: [id number]  : 1980  Date of visit: 2022    Dear Renée Daniel MD:      I saw Kaye Carmichael in follow up to her cholecystectomy done 3/18/22, she is recovering well from surgery. Below are the relevant portions of my assessment and plan of care. If you have questions, please do not hesitate to call me. I look forward to following Kaye Carmichael along with you.     Sincerely,        Tangela Sender, DO

## 2022-04-05 NOTE — PROGRESS NOTES
[facility] General Surgery Clinic  Progress Note    PATIENT NAME: Isaías VISIT DATE: 4/5/2022    SUBJECTIVE:  Edel Healy is a 38 y.o. female who presents to the clinic today for follow up to robot ramirez performed 3/18/22. Pt reports she presented to [facility] for workup for recurrent pancreatitis, she was briefly hospitalized for treatment and discharged to home. Pt has been selective about her diet. She reports some increased sensitivity at two of her incision sites but this continues to improve. Pathology as follows:    -- Diagnosis --   Gallbladder, laparoscopic cholecystectomy:   Chronic cholecystitis, mild. Cholesterolosis. OBJECTIVE:    LMP 03/24/2022 (Approximate)     General Appearance:  awake, alert, no acute distress, well developed, well nourished   Skin:  Skin color, texture, turgor normal. No rashes or lesions. Lungs:  Normal chest expansion, unlabored breathing without accessory muscle use. No audible rales, rhonchi, or wheezing. Cardiovascular: S1S2. No evidence of JVD. No evidence of pulsatile masses in abdomen  Abdomen:  Soft, non-tender, no organomegaly, no masses. Incisions C/D/I without evidence of bleeding/infection  Musculoskeletal: No evidence of bony/muscular deformities, trauma, atrophy of either left/right upper/lower extremity. No evidence of digital clubbing or cyanosis. ASSESSMENT:  No diagnosis found. PLAN:  1. Remove skin glue at the next shower  2. Lifting restriction to less than 20lbs for the next 4 weeks, unrestricted after this. 3. We had a long discussion regarding her recurrent/chronic pancreatitis, she is being followed by services at [facility] for this.   4. F/U with  service prn    Electronically signed by Rayshawn Pantoja DO on 4/5/2022 at 12:55 PM

## 2022-04-06 ENCOUNTER — NURSE TRIAGE (OUTPATIENT)
Dept: OTHER | Facility: CLINIC | Age: 42
End: 2022-04-06

## 2022-04-06 ENCOUNTER — HOSPITAL ENCOUNTER (OUTPATIENT)
Age: 42
Setting detail: SPECIMEN
Discharge: HOME | End: 2022-04-06
Payer: COMMERCIAL

## 2022-04-06 NOTE — TELEPHONE ENCOUNTER
Subjective: Caller states \"I was recently diagnosed to chronic pancreatitis and spent 4 days in the hospital. Released 3/25/22. I do not want to go back to the ER, but I am in a lot of pain. I do have pain meds at home, but I have marissa scared to take them because I am afraid to run out. One of my CT scans showed that I have fluid surrounding my pancreas and was indicative of an acute flare or psuedocyst.I am concerned that it is getting larger and causing the excruciating pain that I have had for the past 3 days. I have have extreme pain that starts by my ribs wraps around to my back. No one is really helping me and every one keeps sending me to the ER. I do not want to have another CT scan d/t all of the radiation. \"    \"I really just want some guidance with my decision to go to the ER or not. I have been like 7 times in 1 month. \"     Pt recently had a follow-up with the GI team and has a MRI scheduled for Saturday. Patient has not taken pain medication today. (RN recommended for patient to take medication and re-evaluate symptoms) if pain does not subside, return to ER,    Current Symptoms: abdominal pain and back pain, nausea     Onset: 3 days ago; worsening    Associated Symptoms: reduced appetite    Pain Severity: 10/10;      Temperature: Denies   What has been tried: muscle relaxer, tylenol, zofran     LMP: NA Pregnant: NA    Recommended disposition: Go to ED Now    Reason for Disposition   SEVERE abdominal pain (e.g., excruciating)    Protocols used: ABDOMINAL PAIN - [facility] - [facility]

## 2022-04-07 PROCEDURE — 83520 IMMUNOASSAY QUANT NOS NONAB: CPT

## 2022-04-09 ENCOUNTER — HOSPITAL ENCOUNTER (OUTPATIENT)
Dept: MRI IMAGING | Age: 42
Discharge: HOME | End: 2022-04-11
Payer: COMMERCIAL

## 2022-04-09 DIAGNOSIS — K86.1 OTHER CHRONIC PANCREATITIS (HCC): ICD-10-CM

## 2022-04-09 PROCEDURE — 74183 MRI ABD W/O CNTR FLWD CNTR: CPT

## 2022-04-09 PROCEDURE — 6360000004 HC RX CONTRAST MEDICATION: Performed by: INTERNAL MEDICINE

## 2022-04-09 PROCEDURE — 2580000003 HC RX 258: Performed by: INTERNAL MEDICINE

## 2022-04-09 PROCEDURE — A9579 GAD-BASE MR CONTRAST NOS,1ML: HCPCS | Performed by: INTERNAL MEDICINE

## 2022-04-09 RX ORDER — SODIUM CHLORIDE 0.9 % (FLUSH) 0.9 %
10 SYRINGE (ML) INJECTION PRN
Status: DISCONTINUED | OUTPATIENT
Start: 2022-04-09 | End: 2022-04-12 | Stop reason: HOSPADM

## 2022-04-09 RX ORDER — 0.9 % SODIUM CHLORIDE 0.9 %
80 INTRAVENOUS SOLUTION INTRAVENOUS ONCE
Status: COMPLETED | OUTPATIENT
Start: 2022-04-09 | End: 2022-04-09

## 2022-04-09 RX ADMIN — SODIUM CHLORIDE, PRESERVATIVE FREE 10 ML: 5 INJECTION INTRAVENOUS at 09:14

## 2022-04-09 RX ADMIN — SODIUM CHLORIDE 80 ML: 9 INJECTION, SOLUTION INTRAVENOUS at 09:14

## 2022-04-09 RX ADMIN — GADOTERIDOL 15 ML: 279.3 INJECTION, SOLUTION INTRAVENOUS at 09:13

## 2022-04-12 LAB — FECAL PANCREATIC ELASTASE-1: 779 UG/G

## 2022-04-15 ENCOUNTER — HOSPITAL ENCOUNTER (EMERGENCY)
Age: 42
Discharge: HOME | End: 2022-04-15
Attending: EMERGENCY MEDICINE
Payer: COMMERCIAL

## 2022-04-15 VITALS
SYSTOLIC BLOOD PRESSURE: 128 MMHG | RESPIRATION RATE: 18 BRPM | OXYGEN SATURATION: 100 % | HEART RATE: 97 BPM | DIASTOLIC BLOOD PRESSURE: 89 MMHG | TEMPERATURE: 98.4 F

## 2022-04-15 DIAGNOSIS — R11.0 NAUSEA: Primary | ICD-10-CM

## 2022-04-15 DIAGNOSIS — R10.12 ABDOMINAL PAIN, LEFT UPPER QUADRANT: ICD-10-CM

## 2022-04-15 PROCEDURE — 6360000002 HC RX W HCPCS: Performed by: PHYSICIAN ASSISTANT

## 2022-04-15 PROCEDURE — 2580000003 HC RX 258: Performed by: PHYSICIAN ASSISTANT

## 2022-04-15 PROCEDURE — 96361 HYDRATE IV INFUSION ADD-ON: CPT

## 2022-04-15 PROCEDURE — 99284 EMERGENCY DEPT VISIT MOD MDM: CPT

## 2022-04-15 PROCEDURE — 96374 THER/PROPH/DIAG INJ IV PUSH: CPT

## 2022-04-15 RX ORDER — ONDANSETRON 2 MG/ML
4 INJECTION INTRAMUSCULAR; INTRAVENOUS ONCE
Status: COMPLETED | OUTPATIENT
Start: 2022-04-15 | End: 2022-04-15

## 2022-04-15 RX ORDER — 0.9 % SODIUM CHLORIDE 0.9 %
1000 INTRAVENOUS SOLUTION INTRAVENOUS ONCE
Status: COMPLETED | OUTPATIENT
Start: 2022-04-15 | End: 2022-04-15

## 2022-04-15 RX ADMIN — ONDANSETRON 4 MG: 2 INJECTION INTRAMUSCULAR; INTRAVENOUS at 13:17

## 2022-04-15 RX ADMIN — SODIUM CHLORIDE 1000 ML: 9 INJECTION, SOLUTION INTRAVENOUS at 13:16

## 2022-04-15 ASSESSMENT — PAIN - FUNCTIONAL ASSESSMENT: PAIN_FUNCTIONAL_ASSESSMENT: 0-10

## 2022-04-15 ASSESSMENT — PAIN SCALES - GENERAL: PAINLEVEL_OUTOF10: 5

## 2022-04-15 NOTE — ED PROVIDER NOTES
Emergency Department           I reviewed the mid level provider's note and agree with the documented findings and we have discussed the plan of care. I have reviewed the emergency nurses triage note. I agree with the chief complaint, past medical history, past surgical history, allergies, medications, social and family history as documented unless otherwise noted below.      Ximena Reyes DO  04/15/22 1246

## 2022-04-15 NOTE — ED NOTES
Patient arrived to ER with complaints of having recent diagnosis of chronic pancreatis and gallbladder surgery. Patient states that she continues to have intermittent LUQ abdominal pain with nausea and has not been staying consistent with being hydrated due to having a recent root canal.   Denies any complaints of vomiting, diarrhea, shortness of breath or any chest pain.       Harvinder Linares, RN  04/15/22 0878

## 2022-04-15 NOTE — ED NOTES
AVS reviewed with the patient. Discussed follow-up care, taking medications as prescribed, and when to call 911. VSS. All questions answered. IV Removed per order. Ambulatory out of the department with a steady, unassisted gait.         Kaylynn Roper RN  04/15/22 0710

## 2022-04-29 ENCOUNTER — NURSE TRIAGE (OUTPATIENT)
Dept: OTHER | Facility: CLINIC | Age: 42
End: 2022-04-29

## 2022-04-29 NOTE — TELEPHONE ENCOUNTER
Subjective: Caller states \"abdominal pain\"     Current Symptoms:   Patient not looking for triage, more moral support/advice  Has been told she has chronic pancreatitis, other doctors disagree with this  Pain since October  Has been meeting with different doctors  Waiting on some results  3/10 had a biopsy which put her into acute pancreatitis, hospitalized  Out for about a month  Has been to the ER since   Had one week where she was feeling ok and returned to a normal diet  Now back to having abdominal pain  Advised patient per [facility] and provided moral support to patient    LMP: April 16 Pregnant: No    Denies any other questions or concerns; instructed to call back for any new or worsening symptoms. This is a result of a call to [address] [place] [address]. Please do not respond to the triage nurse through this encounter. Any subsequent communication should be directly with the patient.     Reason for Disposition   Health Information question, no triage required and triager able to answer question    Protocols used: INFORMATION ONLY CALL - NO TRIAGE-ADULT-

## 2022-05-01 ENCOUNTER — NURSE TRIAGE (OUTPATIENT)
Dept: OTHER | Facility: CLINIC | Age: 42
End: 2022-05-01

## 2022-05-01 NOTE — TELEPHONE ENCOUNTER
Subjective: Caller states \"I called yesterday or day before. At that time I was starting to have a pancreatitis flare up. I have a mass on my pancrease. This caused a flare up. I was in hospital for 4 days. That was a month ago. I have started to return to normal - risky foods. I am having a flare up. I have an apptointment with my new GI  On Tuesday. I called them (the team) they said to do a liquid diet until Tuesday. I am worried my potassium will be low. Should I take supplements until then? \"     Recommended disposition: Call GI on call back and ask. Care advice provided, patient verbalizes understanding; denies any other questions or concerns; instructed to call back for any new or worsening symptoms. Patient/caller agrees to follow-up with PCP     This triage is a result of a call to [address] [place] [address]. Please do not respond to the triage nurse through this encounter. Any subsequent communication should be directly with the patient. Reason for Disposition   Caller has already spoken with the PCP and has no further questions.     Protocols used: NO CONTACT OR DUPLICATE CONTACT CALL-ADULT-

## 2022-05-06 ENCOUNTER — NURSE TRIAGE (OUTPATIENT)
Dept: OTHER | Facility: CLINIC | Age: 42
End: 2022-05-06

## 2022-05-06 NOTE — TELEPHONE ENCOUNTER
Subjective: Caller states \"My dentist today said I have an infection in my tooth and I started on Cephalexin. The reason I'm calling is because I feel like the side of my face where the infection is is hot. And my temperature is fluctuating. \"     Current Symptoms:   Localized swelling to the left side of the face/cheek that feels warm to the touch. Mild HA  Toothache     Onset: Yesterday; Worsening    Pain Severity: 5/10; dull; constant    Temperature: 98.9 current    What has been tried: 1 dose of Cephalexin    LMP: x 1 month ago Pregnant: No    Recommended disposition: See dentist within 24 hours     Patient has already seen the dentist and is wanting advice on what symptoms to watch out for in regards to the infection spreading. Advised patient to look out for fever over 100.4, increased swelling or pain, difficulty swallowing, swollen tongue, difficulty breathing, or sensation that her throat is closing. Care advice provided, patient verbalizes understanding; denies any other questions or concerns; instructed to call back for any new or worsening symptoms. This triage is a result of a call to [address] [place] [address]. Please do not respond to the triage nurse through this encounter. Any subsequent communication should be directly with the patient.       Reason for Disposition   [1] Face is swollen AND [2] no fever    Protocols used: TOOTHACHE-ADULT-

## 2022-05-09 ENCOUNTER — HOSPITAL ENCOUNTER (OUTPATIENT)
Dept: MRI IMAGING | Age: 42
Discharge: HOME | End: 2022-05-11
Payer: COMMERCIAL

## 2022-05-09 DIAGNOSIS — R10.12 LEFT UPPER QUADRANT PAIN: ICD-10-CM

## 2022-05-09 PROCEDURE — 2500000003 HC RX 250 WO HCPCS: Performed by: INTERNAL MEDICINE

## 2022-05-09 PROCEDURE — A9579 GAD-BASE MR CONTRAST NOS,1ML: HCPCS | Performed by: INTERNAL MEDICINE

## 2022-05-09 PROCEDURE — 72197 MRI PELVIS W/O & W/DYE: CPT

## 2022-05-09 PROCEDURE — 6360000004 HC RX CONTRAST MEDICATION: Performed by: INTERNAL MEDICINE

## 2022-05-09 PROCEDURE — 2580000003 HC RX 258: Performed by: INTERNAL MEDICINE

## 2022-05-09 RX ORDER — SODIUM CHLORIDE 0.9 % (FLUSH) 0.9 %
10 SYRINGE (ML) INJECTION PRN
Status: DISCONTINUED | OUTPATIENT
Start: 2022-05-09 | End: 2022-05-12 | Stop reason: HOSPADM

## 2022-05-09 RX ORDER — 0.9 % SODIUM CHLORIDE 0.9 %
40 INTRAVENOUS SOLUTION INTRAVENOUS ONCE
Status: COMPLETED | OUTPATIENT
Start: 2022-05-09 | End: 2022-05-09

## 2022-05-09 RX ADMIN — BARIUM SULFATE 1350 ML: 1 SUSPENSION ORAL at 10:42

## 2022-05-09 RX ADMIN — GADOTERIDOL 20 ML: 279.3 INJECTION, SOLUTION INTRAVENOUS at 10:42

## 2022-05-09 RX ADMIN — SODIUM CHLORIDE 40 ML: 9 INJECTION, SOLUTION INTRAVENOUS at 10:42

## 2022-05-09 RX ADMIN — GLUCAGON HYDROCHLORIDE 0.3 MG: KIT at 10:26

## 2022-05-09 RX ADMIN — SODIUM CHLORIDE, PRESERVATIVE FREE 10 ML: 5 INJECTION INTRAVENOUS at 10:42

## 2022-05-09 RX ADMIN — GLUCAGON HYDROCHLORIDE 0.3 MG: KIT at 10:20

## 2022-05-10 ENCOUNTER — NURSE TRIAGE (OUTPATIENT)
Dept: OTHER | Facility: CLINIC | Age: 42
End: 2022-05-10

## 2022-05-10 NOTE — TELEPHONE ENCOUNTER
Subjective: Caller states \"\" I have dental work 3 weeks ago, right after that my sinuses clogged up. They put the crown on and everything cleared up. Then it started being sensitive to heat and cold. I went back to my dentist last Friday and he started me on Keflex, I have been taking that and Motrin. I go back in today. But for the last 3 days I have had a HA, and it think it might be a migraine     Current Symptoms: HA, neck pain    Onset: 2 days ago; worsening    Associated Symptoms: NA    Pain Severity: 7/10; aching; constant    Temperature: deneis     What has been tried: Motrin and Tylenol    LMP: 1 month ago Pregnant: No    Recommended disposition: seen within 4 hours   Care advice provided, patient verbalizes understanding; denies any other questions or concerns; instructed to call back for any new or worsening symptoms. Patient/caller agrees to follow-up with PCP     This triage is a result of a call to [address] [place] [address]. Please do not respond to the triage nurse through this encounter. Any subsequent communication should be directly with the patient.       Reason for Disposition   [1] SEVERE headache (e.g., excruciating) AND [2] not improved after 2 hours of pain medicine    Protocols used: HEADACHE-ADULT-AH

## 2022-05-31 ENCOUNTER — HOSPITAL ENCOUNTER (OUTPATIENT)
Dept: MRI IMAGING | Age: 42
Discharge: HOME | End: 2022-06-02
Payer: COMMERCIAL

## 2022-05-31 DIAGNOSIS — M54.50 LOW BACK PAIN WITHOUT SCIATICA, UNSPECIFIED BACK PAIN LATERALITY, UNSPECIFIED CHRONICITY: ICD-10-CM

## 2022-05-31 PROCEDURE — 72146 MRI CHEST SPINE W/O DYE: CPT

## 2022-06-10 ENCOUNTER — NURSE TRIAGE (OUTPATIENT)
Dept: OTHER | Facility: CLINIC | Age: 42
End: 2022-06-10

## 2022-06-10 NOTE — TELEPHONE ENCOUNTER
Subjective: Caller states \"I have a complex medical year. Last year my thyroid was removed and Dx with thyroid cancer and till have trouble getting levels regulated. In the process, I got pancreatitis in march. . I also have congestion and went to Urgent care and gave more medicine and got better. I have had a headache for three days the same side my tooth extraction was on and I have TMJ and grind my teeth. I tried to get in with PCP, but they couldn't get me in.\"     Current Symptoms: Headache, temple pain will get tingling back of my head or burning. Onset: 3 days ago;     Associated Symptoms: Nausea     Pain Severity: 6/10; burning, throbbing, tingling; intermittent    Temperature:  Denies Fever    What has been tried: antibiotics, yoga     LMP: NA Pregnant: NA    Recommended disposition: Go to ED/[facility] Now (Or to Office with PCP Approval) due to pt unable to get in with PCP, advised pt to go to [facility]/ED. Care advice provided, patient verbalizes understanding; denies any other questions or concerns; instructed to call back for any new or worsening symptoms. Patient/caller agrees to proceed to nearest [facility] /ED    This triage is a result of a call to [address] [facility]. Please do not respond to the triage nurse through this encounter. Any subsequent communication should be directly with the patient.       Reason for Disposition   SEVERE headache, states 'worst headache' of life    Protocols used: HEADACHE-ADULT-[place]

## 2022-06-11 ENCOUNTER — NURSE TRIAGE (OUTPATIENT)
Dept: OTHER | Facility: CLINIC | Age: 42
End: 2022-06-11

## 2022-06-11 NOTE — TELEPHONE ENCOUNTER
Subjective: Caller states \"I called in yesterday and they recommended going to an [facility]/ED for my headaches. I started at an [facility] and then after a CT they switched it to an ED. They asked if I wanted anything for pain and I declined so they sent me home with prednisone. I woke up this morning and the pain has moved to the top of my head and goes down the back of my neck. I called the ED and the triage nurse Jossy Parsons told me to come back in for evaluation\"     Current Symptoms: headache pain that switched to the top of the head and down the back of the neck    Onset: this morning     Duplicate call, call not triaged     Recommended disposition: Go to ED Now-(PER ED TRIAGE RN)    Patient/caller agrees to proceed to the Emergency Department    This triage is a result of a call to [address] [place] [address] of [place] [address]. Please do not respond to the triage nurse through this encounter. Any subsequent communication should be directly with the patient. Reason for Disposition  Janet Barraza Caller has already spoken with another triager and has no further questions.     Protocols used: NO CONTACT OR DUPLICATE CONTACT CALL-ADULT-
Concussion without loss of consciousness, initial encounter

## 2022-06-20 ENCOUNTER — HOSPITAL ENCOUNTER (OUTPATIENT)
Dept: CT IMAGING | Age: 42
Discharge: HOME | End: 2022-06-22

## 2022-06-20 DIAGNOSIS — G50.1 ATYPICAL FACE PAIN: ICD-10-CM

## 2022-09-28 ENCOUNTER — NURSE TRIAGE (OUTPATIENT)
Dept: OTHER | Facility: CLINIC | Age: 42
End: 2022-09-28

## 2022-09-28 NOTE — TELEPHONE ENCOUNTER
Subjective: Caller states \"Pressure and itching to the rectum\"     Current Symptoms:   Rectal pressure and itching after prolonged sitting. Denies rectal bleeding, abdominal pain, or N/V/D  Reports she was constipated x 1 week ago    Onset: 4 days ago; improving    Pain Severity: 0/10    Temperature: Denies    What has been tried: Witch hazel wipes, hemorrhoid cream, fluids    Recommended disposition: See in Office Today or Tomorrow    Care advice provided, patient verbalizes understanding; denies any other questions or concerns; instructed to call back for any new or worsening symptoms. Patient/caller agrees to proceed to nearest [facility]   Patient reports she has attempted to contact her provider twice and is unable to get anyone on the phone to schedule her an appointment. She is choosing to go to [facility] instead. This triage is a result of a call to [address] [place] [address]. Please do not respond to the triage nurse through this encounter. Any subsequent communication should be directly with the patient.     Reason for Disposition   Home treatment for > 3 days for rectal itching and not improved    Protocols used: Rectal Symptoms-ADULT-[place]

## 2022-11-05 ENCOUNTER — APPOINTMENT (OUTPATIENT)
Dept: GENERAL RADIOLOGY | Age: 42
End: 2022-11-05
Payer: COMMERCIAL

## 2022-11-05 ENCOUNTER — HOSPITAL ENCOUNTER (EMERGENCY)
Age: 42
Discharge: HOME | End: 2022-11-05
Attending: EMERGENCY MEDICINE
Payer: COMMERCIAL

## 2022-11-05 VITALS
WEIGHT: 200 LBS | BODY MASS INDEX: 35.44 KG/M2 | DIASTOLIC BLOOD PRESSURE: 84 MMHG | HEART RATE: 98 BPM | SYSTOLIC BLOOD PRESSURE: 134 MMHG | HEIGHT: 63 IN | TEMPERATURE: 98.1 F | OXYGEN SATURATION: 100 % | RESPIRATION RATE: 17 BRPM

## 2022-11-05 DIAGNOSIS — R07.9 CHEST PAIN, UNSPECIFIED TYPE: Primary | ICD-10-CM

## 2022-11-05 LAB
ABSOLUTE EOS #: 0.1 K/UL (ref 0–0.4)
ABSOLUTE LYMPH #: 3 K/UL (ref 1–4.8)
ABSOLUTE MONO #: 0.8 K/UL (ref 0.1–1.2)
ALBUMIN SERPL-MCNC: 4 G/DL (ref 3.5–5.2)
ALBUMIN/GLOBULIN RATIO: 1.3 (ref 1–2.5)
ALP BLD-CCNC: 73 U/L (ref 35–104)
ALT SERPL-CCNC: 11 U/L (ref 5–33)
ANION GAP SERPL CALCULATED.3IONS-SCNC: 8 MMOL/L (ref 9–17)
AST SERPL-CCNC: 14 U/L
BASOPHILS # BLD: 1 % (ref 0–2)
BASOPHILS ABSOLUTE: 0.1 K/UL (ref 0–0.2)
BILIRUB SERPL-MCNC: 0.2 MG/DL (ref 0.3–1.2)
BUN BLDV-MCNC: 16 MG/DL (ref 6–20)
CALCIUM SERPL-MCNC: 9.1 MG/DL (ref 8.6–10.4)
CHLORIDE BLD-SCNC: 101 MMOL/L (ref 98–107)
CO2: 26 MMOL/L (ref 20–31)
CREAT SERPL-MCNC: 0.7 MG/DL (ref 0.5–0.9)
D-DIMER QUANTITATIVE: 0.44 MG/L FEU
EOSINOPHILS RELATIVE PERCENT: 1 % (ref 1–4)
GFR SERPL CREATININE-BSD FRML MDRD: >60 ML/MIN/1.73M2
GLUCOSE BLD-MCNC: 135 MG/DL (ref 70–99)
HCG QUALITATIVE: NEGATIVE
HCT VFR BLD CALC: 35 % (ref 36–46)
HEMOGLOBIN: 11.9 G/DL (ref 12–16)
LYMPHOCYTES # BLD: 29 % (ref 24–44)
MCH RBC QN AUTO: 29.6 PG (ref 26–34)
MCHC RBC AUTO-ENTMCNC: 34.1 G/DL (ref 31–37)
MCV RBC AUTO: 86.8 FL (ref 80–100)
MONOCYTES # BLD: 8 % (ref 2–11)
PDW BLD-RTO: 14 % (ref 12.5–15.4)
PLATELET # BLD: 313 K/UL (ref 140–450)
PMV BLD AUTO: 7.3 FL (ref 6–12)
POTASSIUM SERPL-SCNC: 3.8 MMOL/L (ref 3.7–5.3)
RBC # BLD: 4.04 M/UL (ref 4–5.2)
SEG NEUTROPHILS: 61 % (ref 36–66)
SEGMENTED NEUTROPHILS ABSOLUTE COUNT: 6.4 K/UL (ref 1.8–7.7)
SODIUM BLD-SCNC: 135 MMOL/L (ref 135–144)
T3 FREE: 3.04 PG/ML (ref 2.02–4.43)
THYROXINE, FREE: 1.24 NG/DL (ref 0.93–1.7)
TOTAL PROTEIN: 7.1 G/DL (ref 6.4–8.3)
TROPONIN, HIGH SENSITIVITY: <6 NG/L (ref 0–14)
TSH SERPL DL<=0.05 MIU/L-ACNC: 1.18 UIU/ML (ref 0.3–5)
WBC # BLD: 10.4 K/UL (ref 3.5–11)

## 2022-11-05 PROCEDURE — 84703 CHORIONIC GONADOTROPIN ASSAY: CPT

## 2022-11-05 PROCEDURE — 99285 EMERGENCY DEPT VISIT HI MDM: CPT

## 2022-11-05 PROCEDURE — 84443 ASSAY THYROID STIM HORMONE: CPT

## 2022-11-05 PROCEDURE — 84481 FREE ASSAY (FT-3): CPT

## 2022-11-05 PROCEDURE — 85379 FIBRIN DEGRADATION QUANT: CPT

## 2022-11-05 PROCEDURE — 84439 ASSAY OF FREE THYROXINE: CPT

## 2022-11-05 PROCEDURE — 36415 COLL VENOUS BLD VENIPUNCTURE: CPT

## 2022-11-05 PROCEDURE — 85025 COMPLETE CBC W/AUTO DIFF WBC: CPT

## 2022-11-05 PROCEDURE — 71045 X-RAY EXAM CHEST 1 VIEW: CPT

## 2022-11-05 PROCEDURE — 80053 COMPREHEN METABOLIC PANEL: CPT

## 2022-11-05 PROCEDURE — 93005 ELECTROCARDIOGRAM TRACING: CPT | Performed by: NURSE PRACTITIONER

## 2022-11-05 PROCEDURE — 84484 ASSAY OF TROPONIN QUANT: CPT

## 2022-11-05 RX ORDER — LIOTHYRONINE SODIUM 5 UG/1
5 TABLET ORAL DAILY
COMMUNITY

## 2022-11-05 ASSESSMENT — PAIN DESCRIPTION - ORIENTATION: ORIENTATION: LEFT;MID

## 2022-11-05 ASSESSMENT — PAIN SCALES - GENERAL: PAINLEVEL_OUTOF10: 2

## 2022-11-05 ASSESSMENT — ENCOUNTER SYMPTOMS
EYES NEGATIVE: 1
STRIDOR: 0
COUGH: 0
GASTROINTESTINAL NEGATIVE: 1
SHORTNESS OF BREATH: 1
CHOKING: 0
WHEEZING: 0
APNEA: 0
CHEST TIGHTNESS: 0

## 2022-11-05 ASSESSMENT — PAIN DESCRIPTION - FREQUENCY: FREQUENCY: INTERMITTENT

## 2022-11-05 ASSESSMENT — LIFESTYLE VARIABLES: HOW MANY STANDARD DRINKS CONTAINING ALCOHOL DO YOU HAVE ON A TYPICAL DAY: PATIENT DOES NOT DRINK

## 2022-11-05 ASSESSMENT — PAIN - FUNCTIONAL ASSESSMENT: PAIN_FUNCTIONAL_ASSESSMENT: 0-10

## 2022-11-05 ASSESSMENT — PAIN DESCRIPTION - LOCATION: LOCATION: CHEST

## 2022-11-05 ASSESSMENT — PAIN DESCRIPTION - DESCRIPTORS: DESCRIPTORS: HEAVINESS

## 2022-11-05 NOTE — ED PROVIDER NOTES
[address] [facility] Emergency Department    [id number] [facility] [address] [facility] [address]  Phone: [phone]  Fax: [phone]  Emergency Department  Faculty Attestation    I performed a history and physical examination of the patient and discussed management with the mid level provideer. I reviewed the mid level provider's note and agree with the documented findings and plan of care. Any areas of disagreement are noted on the chart. I was personally present for the key portions of any procedures. I have documented in the chart those procedures where I was not present during the key portions. I have reviewed the emergency nurses triage note. I agree with the chief complaint, past medical history, past surgical history, allergies, medications, social and family history as documented unless otherwise noted below. Documentation of the HPI, Physical Exam and Medical Decision Making performed by medical students or scribes is based on my personal performance of the HPI, PE and MDM. For Physician Assistant/ Nurse Practitioner cases/documentation I have personally evaluated this patient and have completed at least one if not all key elements of the E/M (history, physical exam, and MDM). Additional findings are as noted.       Primary Care Physician:  Brice Cloud MD    CHIEF COMPLAINT       Chief Complaint   Patient presents with    Chest Pain       RECENT VITALS:   Temp: 98.1 °F (36.7 °C),  Heart Rate: 98, Resp: 17, BP: 134/84    LABS:  Labs Reviewed   CBC WITH AUTO DIFFERENTIAL - Abnormal; Notable for the following components:       Result Value    Hemoglobin 11.9 (*)     Hematocrit 35.0 (*)     All other components within normal limits   COMPREHENSIVE METABOLIC PANEL W/ REFLEX TO MG FOR LOW K - Abnormal; Notable for the following components:    Glucose 135 (*)     Anion Gap 8 (*)     Total Bilirubin 0.2 (*)     All other components within normal limits   TROPONIN   D-DIMER, QUANTITATIVE   TSH   T4, FREE T3, FREE   HCG, SERUM, QUALITATIVE        XR CHEST PORTABLE (Final result)  Result time 11/05/22 15:30:06  Final result by Reinier Lui MD (11/05/22 15:30:06)                Impression:    No acute focal airspace consolidation or pleural effusion. Narrative:    EXAMINATION:   ONE XRAY VIEW OF THE CHEST     11/5/2022 3:22 pm     COMPARISON:   07/22/2021     HISTORY:   ORDERING SYSTEM PROVIDED HISTORY: CP   TECHNOLOGIST PROVIDED HISTORY:   CP   Reason for Exam: PT STATES CHEST PAIN     28-year-old female with chest pain     FINDINGS:   Portable upright view of the chest.     Cardiac monitor leads overlie the chest     Trachea midline. No pneumothorax. No free air. No acute focal airspace   consolidation or pleural effusions. Visualized osseous structures grossly   unremarkable. PERTINENT ATTENDING PHYSICIAN COMMENTS:    EKG: Emergency physician interpretation: Sinus tach 102 with no ischemia. Axis -11, , QRS 84, . Slightly flatter compared to 6 months ago. The patient presents with chest discomfort and tachycardia. The patient is currently trying to get her thyroid medications regulated. She did have some thyroid studies done on Thursday and they were in the normal range. This was 2 days ago. She says she is having mild discomfort. She denies fever however. She has not had a cough. She is not having vomiting or diarrhea. On exam, the patient has normal heart sounds and clear lungs. She has no peripheral edema. The patient's work-up is reassuring including her D-dimer. Some thyroid studies will be a send out. My next suspicion for ACS or PE is low. She should follow-up with her endocrinologist.  She is discharged in good condition.          Rashel Ga MD  11/06/22 [id number]

## 2022-11-05 NOTE — ED PROVIDER NOTES
[facility] Emergency Department  [address] [address] [facility] [address] [facility] [address]  Phone: [phone]  Fax: [phone]        Pt Name: Malu Solomon  MRN: [id number]  [place] 1980  Date of evaluation: 11/5/22    [address] [place] [address]       Chief Complaint   Patient presents with    Chest Pain       HISTORY OF PRESENT ILLNESS (Location/Symptom, Timing/Onset, Context/Setting, Quality, Duration, Modifying Factors, Severity)      Malu Solomon is a 38 y.o. female with no pertinent PMH who presents to the ED via private auto with complaints of some chest discomfort and feeling like her heart is racing for the last 3 days. Patient does have a history of hypothyroidism. She states that she has been trying to regulate her medications, she states her T3 had remained low for quite some time she is been trying to titrate up her medication. She states that when she tried to do this in the past, she also experienced similar symptoms. She states she just had her thyroid levels checked on the third of this month and they were all normal.  Denies any dizziness or lightheadedness. She denies any history of DVT or PE. She denies any nausea vomiting or diarrhea. Denies any headache dizziness or lightheadedness. She states the pain is worse with exertion and better with rest.  She does have a history of anxiety, she states she took some Ativan last night and she was feeling quite anxious, and is slightly improved her symptoms. She denies any recent illness including fever chills or body aches. PAST MEDICAL / SURGICAL / SOCIAL / FAMILY HISTORY     PMH:  has a past medical history of Anxiety, Asthma, Cancer (HCC)-skin, Depression, Fibromyalgia, History of anemia, Hypertension, Myofascial pain, Pancreatitis, PONV (postoperative nausea and vomiting), and Thyroid nodule. Surgical History:  has a past surgical history that includes Skin cancer excision; Thyroidectomy (05/2021);  Colonoscopy (02/2022); Upper gastrointestinal endoscopy (02/2022); Upper gastrointestinal endoscopy (N/A, 3/10/2022); Upper gastrointestinal endoscopy (3/10/2022); skin biopsy; Endoscopy, colon, diagnostic; Cholecystectomy, laparoscopic (03/18/2022); and Cholecystectomy, laparoscopic (N/A, 3/18/2022). Social History:  reports that she has never smoked. She has never used smokeless tobacco. She reports that she does not currently use alcohol. She reports that she does not currently use drugs after having used the following drugs: Marijuana Barb Vivas). Family History: She indicated that her mother is alive. She indicated that her father is alive. family history includes Lung Cancer in her mother; Other in her father; Sleep Apnea in her father. Psychiatric History: None    Allergies: Penicillins and Sudafed [pseudoephedrine hcl]    Home Medications:   Prior to Admission medications    Medication Sig Start Date End Date Taking? Authorizing Provider   liothyronine (CYTOMEL) 5 MCG tablet Take 5 mcg by mouth daily   Yes Historical Provider, MD   docusate sodium (COLACE) 100 MG capsule Take 1 capsule by mouth 2 times daily  Patient not taking: Reported on 11/5/2022 3/18/22   [facility], DO   Levothyroxine Sodium 125 MCG CAPS Take by mouth Daily    Historical Provider, MD   FLUoxetine (PROZAC) 10 MG tablet Take 10 mg by mouth daily  Patient not taking: Reported on 11/5/2022    Historical Provider, MD   melatonin 3 MG TABS tablet Take 3 mg by mouth daily  Patient not taking: Reported on 11/5/2022    Historical Provider, MD   ondansetron (ZOFRAN ODT) 4 MG disintegrating tablet Take 1 tablet by mouth every 8 hours as needed for Nausea  Patient not taking: Reported on 11/5/2022 3/10/22   Eudelia Glassing, DO   metoprolol succinate (TOPROL XL) 25 MG extended release tablet 25 mg daily  11/13/21   Historical Provider, MD   gabapentin (NEURONTIN) 100 MG capsule Take 1 capsule by mouth 2 times daily for 5 days.  Intended supply: 90 days 11/13/21 3/10/22 Loretta Angulo MD   tiZANidine (ZANAFLEX) 2 MG tablet TAKE 1 TABLET BY MOUTH THREE TIMES DAILY AS NEEDED  Patient not taking: Reported on 11/5/2022 9/13/21   Historical Provider, MD   MAGNESIUM GLYCINATE PO Take 400 mg by mouth daily as needed    Historical Provider, MD   meloxicam (MOBIC) 7.5 MG tablet TAKE 1 TABLET BY MOUTH TWICE DAILY AS NEEDED FOR PAIN  Patient not taking: Reported on 11/5/2022 7/21/21   Historical Provider, MD   pantoprazole sodium (PROTONIX) 40 MG PACK packet 40 mg 2 times daily (before meals)     Historical Provider, MD   LORazepam (ATIVAN) 0.5 MG tablet LORazepam 0.5 MG Oral Tablet  TAKE 1 TABLET DAILY AS NEEDED. Quantity: 30 Refills: 0     Equilla Other D.O.; Active    Historical Provider, MD       REVIEW OF SYSTEMS  (2-9 systems for level 4, 10 ormore for level 5)      Review of Systems   Constitutional: Negative. HENT: Negative. Eyes: Negative. Respiratory:  Positive for shortness of breath. Negative for apnea, cough, choking, chest tightness, wheezing and stridor. Cardiovascular:  Positive for chest pain. Negative for palpitations and leg swelling. Gastrointestinal: Negative. Endocrine: Negative. Genitourinary: Negative. Musculoskeletal: Negative. Skin: Negative. Neurological: Negative. Hematological: Negative. Psychiatric/Behavioral: Negative. All other systems negative except as marked. PHYSICAL EXAM  (up to 7 for level 4, 8 or more for level 5)      INITIAL VITALS:  height is 5' 3\" (1.6 m) and weight is 90.7 kg (200 lb). Her oral temperature is 98.1 °F (36.7 °C). Her blood pressure is 134/84 and her pulse is 98. Her respiration is 17 and oxygen saturation is 100%. Vital signs reviewed. Physical Exam  Constitutional:       Appearance: Normal appearance. HENT:      Head: Normocephalic.       Right Ear: Tympanic membrane, ear canal and external ear normal.      Left Ear: Tympanic membrane, ear canal and external ear normal.      Nose: Nose normal.      Mouth/Throat:      Mouth: Mucous membranes are moist.      Pharynx: Oropharynx is clear. Eyes:      Extraocular Movements: Extraocular movements intact. Conjunctiva/sclera: Conjunctivae normal.      Pupils: Pupils are equal, round, and reactive to light. Cardiovascular:      Rate and Rhythm: Normal rate and regular rhythm. Pulses: Normal pulses. Heart sounds: Normal heart sounds. Pulmonary:      Effort: Pulmonary effort is normal.      Breath sounds: Normal breath sounds. Abdominal:      General: Bowel sounds are normal. There is no distension. Palpations: Abdomen is soft. Tenderness: There is no abdominal tenderness. There is no guarding. Musculoskeletal:         General: Normal range of motion. Cervical back: Normal range of motion. Skin:     General: Skin is warm and dry. Capillary Refill: Capillary refill takes less than 2 seconds. Neurological:      Mental Status: She is alert and oriented to person, place, and time. Psychiatric:         Mood and Affect: Mood normal.         Behavior: Behavior normal.         Thought Content: Thought content normal.         Judgment: Judgment normal.         DIFFERENTIAL DIAGNOSIS / MDM     On exam, patient is resting in her room. She appears nontoxic and no distress is noted. She is slightly tachycardic at 101. She does report some chest discomfort in the center of her chest, radiates counted towards the left and generalized pressure of her chest.  She denies any headache dizziness or lightheadedness. Heart sounds are within normal limits upon auscultation. Lung sounds are clear and equal bilaterally. Bowel sounds are present in all 4 quadrants auscultation. No abdominal distention tenderness or guarding is noted. The chest pain is nonreproducible. Upper extremity strength is equal bilaterally. Lower extremity strength equal bilaterally. Patient's gait is steady.   Pupils are equal round react to light and EOM's intact. Order a CBC, CMP, troponin, D-dimer, TSH, T4 T3 serum pregnancy EKG as well as a chest x-ray. CMP is unremarkable. Troponin is negative. Serum pregnancy is negative. CBC is unremarkable. D-dimer is negative. Chest x-ray is negative. TSH of 1.18    Educated patient to follow-up with her endocrinologist as well as her primary care physician within the next couple of days regarding her medications. Educated patient to return to the emergency department with any worsening chest pain, shortness of breath, hemoptysis, hematochezia, hematemesis, intractable nausea or vomiting, abdominal pain, headache dizziness lightheadedness, or any other new concerning or worsening symptoms. Patient states understanding of education and is stable for outpatient follow-up. PLAN (LABS / IMAGING / EKG):  Orders Placed This Encounter   Procedures    XR CHEST PORTABLE    CBC with Auto Differential    Comprehensive Metabolic Panel w/ Reflex to MG    Troponin    D-Dimer, Quantitative    TSH    T4, Free    T3, Free    HCG Qualitative, Serum    EKG 12 Lead    Insert peripheral IV       MEDICATIONS ORDERED:  No orders of the defined types were placed in this encounter. Controlled Substances Monitoring:     DIAGNOSTIC RESULTS     EKG: All EKG's are interpreted by the Emergency Department Physician who either signs or Co-signs this chart in the absenceof a cardiologist.      RADIOLOGY: All images are read by the radiologist and their interpretations are reviewed. XR CHEST PORTABLE   Final Result   No acute focal airspace consolidation or pleural effusion. No results found.     LABS:  Results for orders placed or performed during the hospital encounter of 11/05/22   CBC with Auto Differential   Result Value Ref Range    WBC 10.4 3.5 - 11.0 k/uL    RBC 4.04 4.0 - 5.2 m/uL    Hemoglobin 11.9 (L) 12.0 - 16.0 g/dL    Hematocrit 35.0 (L) 36 - 46 %    MCV 86.8 80 - 100 fL MCH 29.6 26 - 34 pg    MCHC 34.1 31 - 37 g/dL    RDW 14.0 12.5 - 15.4 %    Platelets 907 185 - 200 k/uL    MPV 7.3 6.0 - 12.0 fL    Seg Neutrophils 61 36 - 66 %    Lymphocytes 29 24 - 44 %    Monocytes 8 2 - 11 %    Eosinophils % 1 1 - 4 %    Basophils 1 0 - 2 %    Segs Absolute 6.40 1.8 - 7.7 k/uL    Absolute Lymph # 3.00 1.0 - 4.8 k/uL    Absolute Mono # 0.80 0.1 - 1.2 k/uL    Absolute Eos # 0.10 0.0 - 0.4 k/uL    Basophils Absolute 0.10 0.0 - 0.2 k/uL   Comprehensive Metabolic Panel w/ Reflex to MG   Result Value Ref Range    Glucose 135 (H) 70 - 99 mg/dL    BUN 16 6 - 20 mg/dL    Creatinine 0.70 0.50 - 0.90 mg/dL    Est, Glom Filt Rate >60 >60 mL/min/1.73m2    Calcium 9.1 8.6 - 10.4 mg/dL    Sodium 135 135 - 144 mmol/L    Potassium 3.8 3.7 - 5.3 mmol/L    Chloride 101 98 - 107 mmol/L    CO2 26 20 - 31 mmol/L    Anion Gap 8 (L) 9 - 17 mmol/L    Alkaline Phosphatase 73 35 - 104 U/L    ALT 11 5 - 33 U/L    AST 14 <32 U/L    Total Bilirubin 0.2 (L) 0.3 - 1.2 mg/dL    Total Protein 7.1 6.4 - 8.3 g/dL    Albumin 4.0 3.5 - 5.2 g/dL    Albumin/Globulin Ratio 1.3 1.0 - 2.5   Troponin   Result Value Ref Range    Troponin, High Sensitivity <6 0 - 14 ng/L   D-Dimer, Quantitative   Result Value Ref Range    D-Dimer, Quant 0.44 mg/L FEU   TSH   Result Value Ref Range    TSH 1.18 0.30 - 5.00 uIU/mL   HCG Qualitative, Serum   Result Value Ref Range    hCG Qual NEGATIVE NEGATIVE       EMERGENCY DEPARTMENT COURSE           Vitals:    Vitals:    11/05/22 1422 11/05/22 1438 11/05/22 1555   BP: 134/84     Pulse: (!) 101 (!) 101 98   Resp: 17     Temp: 98.1 °F (36.7 °C)     TempSrc: Oral     SpO2: 100%     Weight: 90.7 kg (200 lb)     Height: 5' 3\" (1.6 m)       -------------------------  BP: 134/84, Temp: 98.1 °F (36.7 °C), Heart Rate: 98, Resp: 17      RE-EVALUATION:  See ED Course notes above. CONSULTS:  None    PROCEDURES:  None    FINAL IMPRESSION      1.  Chest pain, unspecified type          DISPOSITION / PLAN CONDITION ON DISPOSITION:   Good / Stable for discharge. PATIENT REFERRED TO:  Fay Thrasher MD  [address] [facility]  [phone]    Schedule an appointment as soon as possible for a visit       [facility] Emergency Department  [address] [facility]   [address] [place] [address]  [phone]  Go to   If symptoms worsen    DISCHARGE MEDICATIONS:  Discharge Medication List as of 11/5/2022  3:53 PM          LEEANNA Ross NP   Emergency Medicine Nurse Practitioner    (Please note that portions of this note were completed with a voice recognition program.  Efforts were made to edit the dictations but occasionally words aremis-transcribed.)      LEEANNA Ross NP  11/05/22 [id number]

## 2022-11-07 LAB
EKG ATRIAL RATE: 102 BPM
EKG P AXIS: 34 DEGREES
EKG P-R INTERVAL: 148 MS
EKG Q-T INTERVAL: 344 MS
EKG QRS DURATION: 84 MS
EKG QTC CALCULATION (BAZETT): 448 MS
EKG R AXIS: -11 DEGREES
EKG T AXIS: 23 DEGREES
EKG VENTRICULAR RATE: 102 BPM

## 2022-11-29 ENCOUNTER — HOSPITAL ENCOUNTER (OUTPATIENT)
Dept: MRI IMAGING | Age: 42
Discharge: HOME | End: 2022-12-01
Payer: COMMERCIAL

## 2022-11-29 DIAGNOSIS — Z85.850 HISTORY OF THYROID CANCER: ICD-10-CM

## 2022-11-29 DIAGNOSIS — M54.2 NECK PAIN: ICD-10-CM

## 2022-11-29 LAB
BUN BLDV-MCNC: 12 MG/DL (ref 6–20)
CREAT SERPL-MCNC: 0.57 MG/DL (ref 0.5–0.9)
GFR SERPL CREATININE-BSD FRML MDRD: >60 ML/MIN/1.73M2

## 2022-11-29 PROCEDURE — 72156 MRI NECK SPINE W/O & W/DYE: CPT

## 2022-11-29 PROCEDURE — 36415 COLL VENOUS BLD VENIPUNCTURE: CPT

## 2022-11-29 PROCEDURE — A9579 GAD-BASE MR CONTRAST NOS,1ML: HCPCS | Performed by: PSYCHIATRY & NEUROLOGY

## 2022-11-29 PROCEDURE — 82565 ASSAY OF CREATININE: CPT

## 2022-11-29 PROCEDURE — 6360000004 HC RX CONTRAST MEDICATION: Performed by: PSYCHIATRY & NEUROLOGY

## 2022-11-29 PROCEDURE — 84520 ASSAY OF UREA NITROGEN: CPT

## 2022-11-29 RX ORDER — SODIUM CHLORIDE 0.9 % (FLUSH) 0.9 %
10 SYRINGE (ML) INJECTION ONCE
Status: DISCONTINUED | OUTPATIENT
Start: 2022-11-29 | End: 2022-12-02 | Stop reason: HOSPADM

## 2022-11-29 RX ADMIN — GADOTERIDOL 19 ML: 279.3 INJECTION, SOLUTION INTRAVENOUS at 13:50

## 2023-07-12 ENCOUNTER — HOSPITAL ENCOUNTER (OUTPATIENT)
Dept: MRI IMAGING | Age: 43
Discharge: HOME | End: 2023-07-14
Payer: COMMERCIAL

## 2023-07-12 DIAGNOSIS — M25.871 SESAMOIDITIS OF RIGHT FOOT: ICD-10-CM

## 2023-07-12 DIAGNOSIS — S92.901A CLOSED FRACTURE OF RIGHT FOOT, INITIAL ENCOUNTER: ICD-10-CM

## 2023-07-12 PROCEDURE — 73718 MRI LOWER EXTREMITY W/O DYE: CPT

## 2023-09-11 ENCOUNTER — APPOINTMENT (OUTPATIENT)
Dept: ULTRASOUND IMAGING | Age: 43
End: 2023-09-11
Attending: EMERGENCY MEDICINE
Payer: COMMERCIAL

## 2023-09-11 ENCOUNTER — HOSPITAL ENCOUNTER (EMERGENCY)
Age: 43
Discharge: HOME | End: 2023-09-11
Attending: EMERGENCY MEDICINE
Payer: COMMERCIAL

## 2023-09-11 VITALS
TEMPERATURE: 98.1 F | WEIGHT: 205 LBS | BODY MASS INDEX: 36.32 KG/M2 | HEIGHT: 63 IN | RESPIRATION RATE: 18 BRPM | SYSTOLIC BLOOD PRESSURE: 144 MMHG | HEART RATE: 77 BPM | DIASTOLIC BLOOD PRESSURE: 78 MMHG | OXYGEN SATURATION: 100 %

## 2023-09-11 DIAGNOSIS — M79.662 PAIN OF LEFT CALF: Primary | ICD-10-CM

## 2023-09-11 DIAGNOSIS — M79.605 LEFT LEG PAIN: ICD-10-CM

## 2023-09-11 PROCEDURE — 93971 EXTREMITY STUDY: CPT

## 2023-09-11 PROCEDURE — 99284 EMERGENCY DEPT VISIT MOD MDM: CPT

## 2023-09-11 ASSESSMENT — PAIN - FUNCTIONAL ASSESSMENT: PAIN_FUNCTIONAL_ASSESSMENT: 0-10

## 2023-09-11 ASSESSMENT — PAIN DESCRIPTION - ORIENTATION: ORIENTATION: LEFT

## 2023-09-11 ASSESSMENT — PAIN SCALES - GENERAL: PAINLEVEL_OUTOF10: 3

## 2023-09-11 NOTE — DISCHARGE INSTRUCTIONS
Signs of blood clot monitor for any changes in that area. We are happy to help if there is any new major concerns or symptoms.

## 2023-09-15 ASSESSMENT — ENCOUNTER SYMPTOMS
SHORTNESS OF BREATH: 0
CHEST TIGHTNESS: 0

## 2023-10-29 ENCOUNTER — APPOINTMENT (OUTPATIENT)
Dept: CT IMAGING | Age: 43
End: 2023-10-29
Payer: COMMERCIAL

## 2023-10-29 ENCOUNTER — HOSPITAL ENCOUNTER (EMERGENCY)
Age: 43
Discharge: HOME | End: 2023-10-29
Attending: STUDENT IN AN ORGANIZED HEALTH CARE EDUCATION/TRAINING PROGRAM
Payer: COMMERCIAL

## 2023-10-29 VITALS
OXYGEN SATURATION: 99 % | DIASTOLIC BLOOD PRESSURE: 88 MMHG | RESPIRATION RATE: 18 BRPM | WEIGHT: 205 LBS | SYSTOLIC BLOOD PRESSURE: 142 MMHG | TEMPERATURE: 98.8 F | HEART RATE: 87 BPM | HEIGHT: 63 IN | BODY MASS INDEX: 36.32 KG/M2

## 2023-10-29 DIAGNOSIS — R06.02 SHORTNESS OF BREATH: Primary | ICD-10-CM

## 2023-10-29 LAB
ANION GAP SERPL CALCULATED.3IONS-SCNC: 8 MMOL/L (ref 9–17)
BASOPHILS # BLD: 0.1 K/UL (ref 0–0.2)
BASOPHILS NFR BLD: 1 % (ref 0–2)
BUN SERPL-MCNC: 10 MG/DL (ref 6–20)
CALCIUM SERPL-MCNC: 9.1 MG/DL (ref 8.6–10.4)
CHLORIDE SERPL-SCNC: 102 MMOL/L (ref 98–107)
CO2 SERPL-SCNC: 25 MMOL/L (ref 20–31)
CREAT SERPL-MCNC: 0.7 MG/DL (ref 0.5–0.9)
EOSINOPHIL # BLD: 0.1 K/UL (ref 0–0.4)
EOSINOPHILS RELATIVE PERCENT: 1 % (ref 1–4)
ERYTHROCYTE [DISTWIDTH] IN BLOOD BY AUTOMATED COUNT: 13.6 % (ref 12.5–15.4)
GFR SERPL CREATININE-BSD FRML MDRD: >60 ML/MIN/1.73M2
GLUCOSE SERPL-MCNC: 106 MG/DL (ref 70–99)
HCT VFR BLD AUTO: 39.3 % (ref 36–46)
HGB BLD-MCNC: 13.2 G/DL (ref 12–16)
LYMPHOCYTES NFR BLD: 2.3 K/UL (ref 1–4.8)
LYMPHOCYTES RELATIVE PERCENT: 25 % (ref 24–44)
MCH RBC QN AUTO: 30.6 PG (ref 26–34)
MCHC RBC AUTO-ENTMCNC: 33.5 G/DL (ref 31–37)
MCV RBC AUTO: 91.4 FL (ref 80–100)
MONOCYTES NFR BLD: 0.8 K/UL (ref 0.1–1.2)
MONOCYTES NFR BLD: 9 % (ref 2–11)
NEUTROPHILS NFR BLD: 64 % (ref 36–66)
NEUTS SEG NFR BLD: 6.1 K/UL (ref 1.8–7.7)
PLATELET # BLD AUTO: 367 K/UL (ref 140–450)
PMV BLD AUTO: 7.3 FL (ref 6–12)
POTASSIUM SERPL-SCNC: 4.3 MMOL/L (ref 3.7–5.3)
RBC # BLD AUTO: 4.31 M/UL (ref 4–5.2)
SARS-COV-2 RDRP RESP QL NAA+PROBE: NOT DETECTED
SODIUM SERPL-SCNC: 135 MMOL/L (ref 135–144)
SPECIMEN DESCRIPTION: NORMAL
TROPONIN I SERPL HS-MCNC: <6 NG/L (ref 0–14)
WBC OTHER # BLD: 9.4 K/UL (ref 3.5–11)

## 2023-10-29 PROCEDURE — 84484 ASSAY OF TROPONIN QUANT: CPT

## 2023-10-29 PROCEDURE — 80048 BASIC METABOLIC PNL TOTAL CA: CPT

## 2023-10-29 PROCEDURE — 36415 COLL VENOUS BLD VENIPUNCTURE: CPT

## 2023-10-29 PROCEDURE — 2580000003 HC RX 258: Performed by: PHYSICIAN ASSISTANT

## 2023-10-29 PROCEDURE — 93005 ELECTROCARDIOGRAM TRACING: CPT | Performed by: PHYSICIAN ASSISTANT

## 2023-10-29 PROCEDURE — 85025 COMPLETE CBC W/AUTO DIFF WBC: CPT

## 2023-10-29 PROCEDURE — 87635 SARS-COV-2 COVID-19 AMP PRB: CPT

## 2023-10-29 PROCEDURE — 99285 EMERGENCY DEPT VISIT HI MDM: CPT

## 2023-10-29 PROCEDURE — 71260 CT THORAX DX C+: CPT

## 2023-10-29 PROCEDURE — 6360000004 HC RX CONTRAST MEDICATION: Performed by: PHYSICIAN ASSISTANT

## 2023-10-29 RX ORDER — LEVOTHYROXINE SODIUM 137 UG/1
TABLET ORAL
COMMUNITY

## 2023-10-29 RX ORDER — 0.9 % SODIUM CHLORIDE 0.9 %
80 INTRAVENOUS SOLUTION INTRAVENOUS ONCE
Status: DISCONTINUED | OUTPATIENT
Start: 2023-10-29 | End: 2023-10-29 | Stop reason: HOSPADM

## 2023-10-29 RX ORDER — SODIUM CHLORIDE 0.9 % (FLUSH) 0.9 %
10 SYRINGE (ML) INJECTION PRN
Status: DISCONTINUED | OUTPATIENT
Start: 2023-10-29 | End: 2023-10-29 | Stop reason: HOSPADM

## 2023-10-29 RX ADMIN — IOPAMIDOL 75 ML: 755 INJECTION, SOLUTION INTRAVENOUS at 14:45

## 2023-10-29 RX ADMIN — SODIUM CHLORIDE, PRESERVATIVE FREE 10 ML: 5 INJECTION INTRAVENOUS at 14:46

## 2023-10-29 RX ADMIN — Medication 80 ML: at 14:51

## 2023-10-29 ASSESSMENT — PAIN - FUNCTIONAL ASSESSMENT: PAIN_FUNCTIONAL_ASSESSMENT: 0-10

## 2023-10-29 ASSESSMENT — ENCOUNTER SYMPTOMS
SHORTNESS OF BREATH: 1
VOMITING: 0
HEMOPTYSIS: 0
WHEEZING: 0
SPUTUM PRODUCTION: 0
COUGH: 0

## 2023-10-29 ASSESSMENT — PAIN SCALES - GENERAL: PAINLEVEL_OUTOF10: 0

## 2023-10-29 NOTE — ED PROVIDER NOTES
[address] [facility]  Emergency Department  Emergency Medicine Attending Physician  [address] [facility] Emergency [facility]     Patient Name: Thom Stewart  MRN: [id number]  [address] [facility] [place] 1980  Date of evaluation: 10/29/23           I was personally available for consultation in the Emergency Department. Have reviewed everything on the chart that is available and agree with the documentation provided by the advanced practice provider, including discussion about the assessment, treatment plan and disposition. Thom Stewart is a 41 y.o. female who presents with Post-op Problem      HPI Noted on my Interview and Exam: Mild shortness of breath since her hysterectomy about 1 week ago. Patient denies systemic symptoms. Reports that her mother does have a blood clot history and she reports vague rheumatology work-up which she states that her doctor had told her previously \"do not want anybody placed on hormones as you are high risk for blood clot\". She is unsure of a formal diagnosis by this rheumatologist.  No previous DVT or PE. PAST MEDICAL / SURGICAL / SOCIAL / FAMILY HISTORY      has a past medical history of Anxiety, Asthma, Cancer (HCC)-skin, Depression, Fibromyalgia, History of anemia, Hypertension, Myofascial pain, Pancreatitis, PONV (postoperative nausea and vomiting), and Thyroid nodule. has a past surgical history that includes Skin cancer excision; Thyroidectomy (05/2021); Colonoscopy (02/2022); Upper gastrointestinal endoscopy (02/2022); Upper gastrointestinal endoscopy (N/A, 03/10/2022); Upper gastrointestinal endoscopy (03/10/2022); skin biopsy; Endoscopy, colon, diagnostic; Cholecystectomy, laparoscopic (03/18/2022); Cholecystectomy, laparoscopic (N/A, 03/18/2022); and Hysterectomy, total abdominal (10/19/2023).       Social History     Socioeconomic History    Marital status:      Spouse name: Not on file    Number of children: Not on file    Years of

## 2023-10-29 NOTE — ED PROVIDER NOTES
[address] [facility]  eMERGENCY dEPARTMENT eNCOUnter      Pt Name: Nadeen Colon  MRN: [id number]  [address] [facility] 1980  Date of evaluation: 10/29/23      CHIEF COMPLAINT       Chief Complaint   Patient presents with    Post-op Problem         HISTORY OF PRESENT ILLNESS    Nadeen Colon is a 41 y.o. female who presents complaining of SOB thinks she may have PE as she just had surgery last week     Shortness of Breath  Severity:  Mild  Onset quality:  Gradual  Duration:  1 week  Timing:  Intermittent  Progression:  Waxing and waning  Chronicity:  New  Context: activity    Relieved by:  Nothing  Worsened by: Activity  Ineffective treatments:  None tried  Associated symptoms: no chest pain, no cough, no headaches, no hemoptysis, no rash, no sputum production, no vomiting and no wheezing    Risk factors: family hx of DVT, obesity and recent surgery        REVIEW OF SYSTEMS       Review of Systems   Respiratory:  Positive for shortness of breath. Negative for cough, hemoptysis, sputum production and wheezing. Cardiovascular:  Negative for chest pain. Gastrointestinal:  Negative for vomiting. Skin:  Negative for rash. Neurological:  Negative for headaches. All other systems reviewed and are negative.       PAST MEDICAL HISTORY     Past Medical History:   Diagnosis Date    Anxiety     Asthma     Cancer (HCC)-skin     thyroid cancer    Depression     Fibromyalgia     History of anemia     has had iron infusions    Hypertension     Myofascial pain     of abdomen     Pancreatitis     PONV (postoperative nausea and vomiting)     Thyroid nodule     thyroid cancer       SURGICAL HISTORY       Past Surgical History:   Procedure Laterality Date    CHOLECYSTECTOMY, LAPAROSCOPIC  03/18/2022    robotic    CHOLECYSTECTOMY, LAPAROSCOPIC N/A 03/18/2022    CHOLECYSTECTOMY LAPAROSCOPIC ROBOTIC performed by Palak Conner DO at [address]  02/2022    ENDOSCOPY, COLON,

## 2023-10-30 LAB
EKG ATRIAL RATE: 77 BPM
EKG P AXIS: 30 DEGREES
EKG P-R INTERVAL: 164 MS
EKG Q-T INTERVAL: 396 MS
EKG QRS DURATION: 74 MS
EKG QTC CALCULATION (BAZETT): 448 MS
EKG R AXIS: 12 DEGREES
EKG T AXIS: 20 DEGREES
EKG VENTRICULAR RATE: 77 BPM

## 2024-07-16 ENCOUNTER — APPOINTMENT (OUTPATIENT)
Dept: CT IMAGING | Age: 44
End: 2024-07-16
Payer: COMMERCIAL

## 2024-07-16 ENCOUNTER — APPOINTMENT (OUTPATIENT)
Dept: GENERAL RADIOLOGY | Age: 44
End: 2024-07-16
Payer: COMMERCIAL

## 2024-07-16 ENCOUNTER — HOSPITAL ENCOUNTER (EMERGENCY)
Age: 44
Discharge: HOME | End: 2024-07-16
Attending: EMERGENCY MEDICINE
Payer: COMMERCIAL

## 2024-07-16 VITALS
OXYGEN SATURATION: 100 % | SYSTOLIC BLOOD PRESSURE: 137 MMHG | HEART RATE: 81 BPM | HEIGHT: 63 IN | DIASTOLIC BLOOD PRESSURE: 93 MMHG | WEIGHT: 200 LBS | RESPIRATION RATE: 18 BRPM | TEMPERATURE: 98 F | BODY MASS INDEX: 35.44 KG/M2

## 2024-07-16 DIAGNOSIS — R07.9 CHEST PAIN, UNSPECIFIED TYPE: Primary | ICD-10-CM

## 2024-07-16 LAB
ANION GAP SERPL CALCULATED.3IONS-SCNC: 7 MMOL/L (ref 9–17)
BASOPHILS # BLD: 0.1 K/UL (ref 0–0.2)
BASOPHILS NFR BLD: 1 % (ref 0–2)
BUN SERPL-MCNC: 19 MG/DL (ref 6–20)
CALCIUM SERPL-MCNC: 9.3 MG/DL (ref 8.6–10.4)
CHLORIDE SERPL-SCNC: 102 MMOL/L (ref 98–107)
CO2 SERPL-SCNC: 27 MMOL/L (ref 20–31)
CREAT SERPL-MCNC: 0.7 MG/DL (ref 0.5–0.9)
D DIMER PPP FEU-MCNC: 0.52 UG/ML FEU
EOSINOPHIL # BLD: 0.1 K/UL (ref 0–0.4)
EOSINOPHILS RELATIVE PERCENT: 1 % (ref 1–4)
ERYTHROCYTE [DISTWIDTH] IN BLOOD BY AUTOMATED COUNT: 13 % (ref 12.5–15.4)
GFR, ESTIMATED: >90 ML/MIN/1.73M2
GLUCOSE SERPL-MCNC: 98 MG/DL (ref 70–99)
HCT VFR BLD AUTO: 37.8 % (ref 36–46)
HGB BLD-MCNC: 12.8 G/DL (ref 12–16)
LYMPHOCYTES NFR BLD: 4.2 K/UL (ref 1–4.8)
LYMPHOCYTES RELATIVE PERCENT: 35 % (ref 24–44)
MAGNESIUM SERPL-MCNC: 2 MG/DL (ref 1.6–2.6)
MCH RBC QN AUTO: 31.2 PG (ref 26–34)
MCHC RBC AUTO-ENTMCNC: 33.9 G/DL (ref 31–37)
MCV RBC AUTO: 92.1 FL (ref 80–100)
MONOCYTES NFR BLD: 1 K/UL (ref 0.1–1.2)
MONOCYTES NFR BLD: 8 % (ref 2–11)
NEUTROPHILS NFR BLD: 55 % (ref 36–66)
NEUTS SEG NFR BLD: 6.7 K/UL (ref 1.8–7.7)
PLATELET # BLD AUTO: 350 K/UL (ref 140–450)
PMV BLD AUTO: 7.8 FL (ref 6–12)
POTASSIUM SERPL-SCNC: 3.8 MMOL/L (ref 3.7–5.3)
RBC # BLD AUTO: 4.1 M/UL (ref 4–5.2)
SODIUM SERPL-SCNC: 136 MMOL/L (ref 135–144)
TROPONIN I SERPL HS-MCNC: <6 NG/L (ref 0–14)
TSH SERPL DL<=0.05 MIU/L-ACNC: 0.11 UIU/ML (ref 0.3–5)
WBC OTHER # BLD: 12.1 K/UL (ref 3.5–11)

## 2024-07-16 PROCEDURE — 6360000004 HC RX CONTRAST MEDICATION: Performed by: EMERGENCY MEDICINE

## 2024-07-16 PROCEDURE — 80048 BASIC METABOLIC PNL TOTAL CA: CPT

## 2024-07-16 PROCEDURE — 84443 ASSAY THYROID STIM HORMONE: CPT

## 2024-07-16 PROCEDURE — 85025 COMPLETE CBC W/AUTO DIFF WBC: CPT

## 2024-07-16 PROCEDURE — 93005 ELECTROCARDIOGRAM TRACING: CPT | Performed by: EMERGENCY MEDICINE

## 2024-07-16 PROCEDURE — 85379 FIBRIN DEGRADATION QUANT: CPT

## 2024-07-16 PROCEDURE — 99285 EMERGENCY DEPT VISIT HI MDM: CPT

## 2024-07-16 PROCEDURE — 83735 ASSAY OF MAGNESIUM: CPT

## 2024-07-16 PROCEDURE — 84436 ASSAY OF TOTAL THYROXINE: CPT

## 2024-07-16 PROCEDURE — 84481 FREE ASSAY (FT-3): CPT

## 2024-07-16 PROCEDURE — 71046 X-RAY EXAM CHEST 2 VIEWS: CPT

## 2024-07-16 PROCEDURE — 71260 CT THORAX DX C+: CPT

## 2024-07-16 PROCEDURE — 2580000003 HC RX 258: Performed by: EMERGENCY MEDICINE

## 2024-07-16 PROCEDURE — 84484 ASSAY OF TROPONIN QUANT: CPT

## 2024-07-16 PROCEDURE — 84439 ASSAY OF FREE THYROXINE: CPT

## 2024-07-16 RX ORDER — SODIUM CHLORIDE 0.9 % (FLUSH) 0.9 %
10 SYRINGE (ML) INJECTION ONCE
Status: COMPLETED | OUTPATIENT
Start: 2024-07-16 | End: 2024-07-16

## 2024-07-16 RX ORDER — 0.9 % SODIUM CHLORIDE 0.9 %
80 INTRAVENOUS SOLUTION INTRAVENOUS ONCE
Status: DISCONTINUED | OUTPATIENT
Start: 2024-07-16 | End: 2024-07-17 | Stop reason: HOSPADM

## 2024-07-16 RX ADMIN — IOPAMIDOL 75 ML: 755 INJECTION, SOLUTION INTRAVENOUS at 21:43

## 2024-07-16 RX ADMIN — Medication 80 ML: at 21:43

## 2024-07-16 RX ADMIN — SODIUM CHLORIDE, PRESERVATIVE FREE 10 ML: 5 INJECTION INTRAVENOUS at 21:43

## 2024-07-16 ASSESSMENT — PAIN - FUNCTIONAL ASSESSMENT: PAIN_FUNCTIONAL_ASSESSMENT: 0-10

## 2024-07-16 ASSESSMENT — PAIN SCALES - GENERAL: PAINLEVEL_OUTOF10: 5

## 2024-07-17 LAB
EKG ATRIAL RATE: 81 BPM
EKG P AXIS: 23 DEGREES
EKG P-R INTERVAL: 156 MS
EKG Q-T INTERVAL: 376 MS
EKG QRS DURATION: 78 MS
EKG QTC CALCULATION (BAZETT): 436 MS
EKG R AXIS: -11 DEGREES
EKG T AXIS: 30 DEGREES
EKG VENTRICULAR RATE: 81 BPM
T3FREE SERPL-MCNC: 3 PG/ML (ref 2–4.4)
T4 FREE SERPL-MCNC: 1.4 NG/DL (ref 0.92–1.68)
T4 SERPL-MCNC: 8.4 UG/DL (ref 4.5–11.7)

## 2024-07-17 NOTE — DISCHARGE INSTRUCTIONS
Continue taking all the medications as prescribed by your doctor.  Follow-up with your primary care doctor in the morning as scheduled.  Get the stress test as an outpatient.  Please review the thyroid results with your doctor.  Return to the emergency department with any problems or concerns as discussed.

## 2024-07-17 NOTE — ED PROVIDER NOTES
[facility] Emergency Department  [address]  [facility] [address]  Phone: [phone]  Fax: [phone]  EMERGENCY DEPARTMENT ENCOUNTER      Pt Name: Jenn Bansal  MRN: [id number]  Birthdate 1980  Date of evaluation: 7/16/2024    CHIEF COMPLAINT       Chief Complaint   Patient presents with    Chest Pain     Patient c/o left-sided intermittent chest pain that started last Saturday but stopped and happened again last night. Patient states last week she was on Contrave medication last week for 5 days then stopped it on Saturday, thinking the medication is what start the chest pain.       HISTORY OF PRESENT ILLNESS    Jenn Bansal is a 44 y.o. female who presents to the emergency department complaining of left sided chest pain which is intermittent and has been ongoing for a week.  Patient states she was recently started on Contrave took it for 5 days and then she discontinued it.  She states she started having chest pain while she was on Contrave.  She denies any palpitations but at times she does get diaphoretic.  She has a history of anxiety.  She took Ativan 2 days ago and it helped her symptoms.  She also has a history of hiatal hernia and she has been treating herself for acid reflux.  She states at times she gets some relief but not her regular treatments have helped.  Patient has a history of thyroid cancer status post thyroidectomy.  She has been taking all of her medications.  She denies any fever, chills, or cough.  She denies any nausea, vomiting, diarrhea, constipation, or abdominal pain.  Denies any flank pain, hematuria, dysuria.  She denies any fall or trauma.  She does not have a history of thromboembolic disease, or coronary artery disease.  She had an echocardiogram done recently which showed left ventricular hypertrophy but was told that should be fine.  She states her doctor has scheduled her for a stress test as an outpatient but she has not gotten it.   5.3 mmol/L    Chloride 102 98 - 107 mmol/L    CO2 27 20 - 31 mmol/L    Anion Gap 7 (L) 9 - 17 mmol/L    Glucose 98 70 - 99 mg/dL    BUN 19 6 - 20 mg/dL    Creatinine 0.7 0.5 - 0.9 mg/dL    Est, Glom Filt Rate >90 >60 mL/min/1.73m2    Calcium 9.3 8.6 - 10.4 mg/dL   CBC with Auto Differential   Result Value Ref Range    WBC 12.1 (H) 3.5 - 11.0 k/uL    RBC 4.10 4.0 - 5.2 m/uL    Hemoglobin 12.8 12.0 - 16.0 g/dL    Hematocrit 37.8 36 - 46 %    MCV 92.1 80 - 100 fL    MCH 31.2 26 - 34 pg    MCHC 33.9 31 - 37 g/dL    RDW 13.0 12.5 - 15.4 %    Platelets 350 140 - 450 k/uL    MPV 7.8 6.0 - 12.0 fL    Neutrophils % 55 36 - 66 %    Lymphocytes % 35 24 - 44 %    Monocytes % 8 2 - 11 %    Eosinophils % 1 1 - 4 %    Basophils % 1 0 - 2 %    Neutrophils Absolute 6.70 1.8 - 7.7 k/uL    Lymphocytes Absolute 4.20 1.0 - 4.8 k/uL    Monocytes Absolute 1.00 0.1 - 1.2 k/uL    Eosinophils Absolute 0.10 0.0 - 0.4 k/uL    Basophils Absolute 0.10 0.0 - 0.2 k/uL   Magnesium   Result Value Ref Range    Magnesium 2.0 1.6 - 2.6 mg/dL   Troponin   Result Value Ref Range    Troponin, High Sensitivity <6 0 - 14 ng/L   D-Dimer, Quantitative   Result Value Ref Range    D-Dimer, Quant 0.52 ug/mL FEU   EKG 12 Lead   Result Value Ref Range    Ventricular Rate 81 BPM    Atrial Rate 81 BPM    P-R Interval 156 ms    QRS Duration 78 ms    Q-T Interval 376 ms    QTc Calculation (Bazett) 436 ms    P Axis 23 degrees    R Axis -11 degrees    T Axis 30 degrees       Not indicated unless otherwise documented above    RADIOLOGY:   I reviewed the radiologist interpretations:    CT CHEST PULMONARY EMBOLISM W CONTRAST   Final Result   No evidence of pulmonary embolism or acute pulmonary abnormality.         XR CHEST (2 VW)   Final Result   No acute cardiopulmonary process.             Not indicated unless otherwise documented above    EMERGENCY DEPARTMENT COURSE:     The patient was given the following medications:  Orders Placed This Encounter   Medications

## 2024-09-19 ENCOUNTER — HOSPITAL ENCOUNTER (OUTPATIENT)
Dept: MRI IMAGING | Age: 44
Discharge: HOME | End: 2024-09-21
Payer: COMMERCIAL

## 2024-09-19 DIAGNOSIS — H90.3 ASNHL (ASYMMETRICAL SENSORINEURAL HEARING LOSS): ICD-10-CM

## 2024-09-19 DIAGNOSIS — H90.3 SENSORINEURAL HEARING LOSS, BILATERAL: ICD-10-CM

## 2024-09-19 PROCEDURE — 2580000003 HC RX 258: Performed by: PHYSICIAN ASSISTANT

## 2024-09-19 PROCEDURE — 6360000004 HC RX CONTRAST MEDICATION: Performed by: PHYSICIAN ASSISTANT

## 2024-09-19 PROCEDURE — A9579 GAD-BASE MR CONTRAST NOS,1ML: HCPCS | Performed by: PHYSICIAN ASSISTANT

## 2024-09-19 RX ORDER — SODIUM CHLORIDE 0.9 % (FLUSH) 0.9 %
10 SYRINGE (ML) INJECTION ONCE
Status: DISCONTINUED | OUTPATIENT
Start: 2024-09-19 | End: 2024-09-19

## 2024-10-23 ENCOUNTER — HOSPITAL ENCOUNTER (EMERGENCY)
Age: 44
Discharge: HOME | End: 2024-10-23

## (undated) DEVICE — TIP COVER ACCESSORY

## (undated) DEVICE — PLUMEPORT LAPAROSCOPIC SMOKE FILTRATION DEVICE: Brand: PLUMEPORT ACTIV

## (undated) DEVICE — SOLUTION ANTIFOG VIS SYS CLEARIFY LAPSCP

## (undated) DEVICE — ANCHOR TISSUE RETRIEVAL SYSTEM, BAG SIZE 125 ML, PORT SIZE 8 MM: Brand: ANCHOR TISSUE RETRIEVAL SYSTEM

## (undated) DEVICE — GLOVE ORANGE PI 7   MSG9070

## (undated) DEVICE — BLADELESS OBTURATOR: Brand: WECK VISTA

## (undated) DEVICE — PROTECTOR ULN NRV PUR FOAM HK LOOP STRP ANATOMICALLY

## (undated) DEVICE — NEEDLE INSUF L120MM DIA2MM DISP FOR PNEUMOPERI ENDOPATH

## (undated) DEVICE — ARM DRAPE

## (undated) DEVICE — INTENDED FOR TISSUE SEPARATION, AND OTHER PROCEDURES THAT REQUIRE A SHARP SURGICAL BLADE TO PUNCTURE OR CUT.: Brand: BARD-PARKER ® CARBON RIB-BACK BLADES

## (undated) DEVICE — STRAP,POSITIONING,KNEE/BODY,FOAM,4X60": Brand: MEDLINE

## (undated) DEVICE — MHPB BASIC LAP PACK: Brand: MEDLINE INDUSTRIES, INC.

## (undated) DEVICE — CHLORAPREP 26ML ORANGE

## (undated) DEVICE — GOWN,AURORA,NONRNF,XL,30/CS: Brand: MEDLINE

## (undated) DEVICE — CLIP INT L POLYMER LOK LIG HEM O LOK

## (undated) DEVICE — SUTURE MCRYL + SZ 4-0 L27IN ABSRB UD L19MM PS-2 3/8 CIR MCP426H

## (undated) DEVICE — SYSTEM BX 25GA FN NDL SIX DST CUT EDG SHARKCORE

## (undated) DEVICE — SOLUTION IV IRRIG POUR BRL 0.9% SODIUM CHL 2F7124

## (undated) DEVICE — ADHESIVE SKIN CLSR 0.7ML TOP DERMBND ADV

## (undated) DEVICE — 40580 - THE PINK PAD - ADVANCED TRENDELENBURG POSITIONING KIT: Brand: 40580 - THE PINK PAD - ADVANCED TRENDELENBURG POSITIONING KIT

## (undated) DEVICE — PENCIL ES L3M BTTN SWCH HOLSTER W/ BLDE ELECTRD EDGE

## (undated) DEVICE — BLANKET WRM W29.9XL79.1IN UP BODY FORC AIR MISTRAL-AIR

## (undated) DEVICE — FORCEPS BX L240CM WRK CHN 2.8MM STD CAP W/ NDL MIC MESH

## (undated) DEVICE — CANNULA SEAL

## (undated) DEVICE — ELECTRODE PT RET AD L9FT HI MOIST COND ADH HYDRGEL CORDED

## (undated) DEVICE — SYRINGE MED 10ML LUERLOCK TIP W/O SFTY DISP